# Patient Record
Sex: MALE | Race: WHITE | NOT HISPANIC OR LATINO | Employment: FULL TIME | URBAN - METROPOLITAN AREA
[De-identification: names, ages, dates, MRNs, and addresses within clinical notes are randomized per-mention and may not be internally consistent; named-entity substitution may affect disease eponyms.]

---

## 2017-03-17 ENCOUNTER — OFFICE VISIT (OUTPATIENT)
Dept: URGENT CARE | Facility: RETAIL CLINIC | Age: 47
End: 2017-03-17
Payer: COMMERCIAL

## 2017-03-17 VITALS — HEART RATE: 90 BPM | TEMPERATURE: 98.8 F | SYSTOLIC BLOOD PRESSURE: 122 MMHG | DIASTOLIC BLOOD PRESSURE: 87 MMHG

## 2017-03-17 DIAGNOSIS — J02.9 ACUTE PHARYNGITIS, UNSPECIFIED ETIOLOGY: Primary | ICD-10-CM

## 2017-03-17 LAB — S PYO AG THROAT QL IA.RAPID: NORMAL

## 2017-03-17 PROCEDURE — 87081 CULTURE SCREEN ONLY: CPT | Performed by: PHYSICIAN ASSISTANT

## 2017-03-17 PROCEDURE — 99202 OFFICE O/P NEW SF 15 MIN: CPT | Performed by: PHYSICIAN ASSISTANT

## 2017-03-17 PROCEDURE — 87880 STREP A ASSAY W/OPTIC: CPT | Mod: QW | Performed by: PHYSICIAN ASSISTANT

## 2017-03-17 RX ORDER — PENICILLIN V POTASSIUM 500 MG/1
500 TABLET, FILM COATED ORAL 2 TIMES DAILY
Qty: 20 TABLET | Refills: 0 | Status: SHIPPED | OUTPATIENT
Start: 2017-03-17 | End: 2017-03-27

## 2017-03-17 NOTE — MR AVS SNAPSHOT
"              After Visit Summary   3/17/2017    To White    MRN: [id number]           Patient Information     Date Of Birth          1970        Visit Information        Provider Department      3/17/2017 9:30 AM Nae Burns PA-C [facility]        Today's Diagnoses     Acute pharyngitis, unspecified etiology    -  1      Care Instructions    Rapid strep test today is negative.   Your throat culture is pending. [facility] will call if positive results to start antibiotics at that time; No call if the culture is negative.  Start penicillin twice daily for 10 days since you're leaving the country today.  Drink plenty of fluids and rest.  May use salt water gargles- about 8 oz warm water with about 1 teaspoon salt  Sucrets and Cepacol spray are over the counter medications that numb the throat.  Over the counter pain relievers such as tylenol or ibuprofen may be used as needed.   Honey lemon tea helps to soothe the throat. \"Throat Coat\" tea is soothing as well.  Please follow up with primary care provider if not improving, worsening or new symptoms.        Follow-ups after your visit        Who to contact     You can reach your care team any time of the day by calling [phone].  Notification of test results:  If you have an abnormal lab result, we will notify you by phone as soon as possible.         Additional Information About Your Visit        MyChart Information     ShareSDK[employer] gives you secure access to your electronic health record. If you see a primary care provider, you can also send messages to your care team and make appointments. If you have questions, please call your primary care clinic.  If you do not have a primary care provider, please call [phone] and they will assist you.        Care EveryWhere ID     This is your Care EveryWhere ID. This could be used by other organizations to access your [facility] medical records  [id number]        Your Vitals Were     " Pulse Temperature                90 98.8  F (37.1  C) (Temporal)           Blood Pressure from Last 3 Encounters:   03/17/17 122/87   08/08/16 128/80   08/03/15 105/60    Weight from Last 3 Encounters:   08/08/16 268 lb 11.2 oz (121.9 kg)   08/03/15 261 lb (118.4 kg)   07/22/15 261 lb 8 oz (118.6 kg)              We Performed the Following     BETA STREP GROUP A R/O CULTURE     RAPID STREP SCREEN          Today's Medication Changes          These changes are accurate as of: 3/17/17  9:40 AM.  If you have any questions, ask your nurse or doctor.               Start taking these medicines.        Dose/Directions    penicillin V potassium 500 MG tablet   Commonly known as:  VEETID   Used for:  Acute pharyngitis, unspecified etiology        Dose:  500 mg   Take 1 tablet (500 mg) by mouth 2 times daily for 10 days   Quantity:  20 tablet   Refills:  0            Where to get your medicines      These medications were sent to [facility] - [address]   [address] [place] [address]     Phone:  [phone]     penicillin V potassium 500 MG tablet                Primary Care Provider Office Phone # Fax #    Jordyn Molly Sexton [phone]       [facility] [address] [place] [address]        Thank you!     Thank you for choosing [facility]  for your care. Our goal is always to provide you with excellent care. Hearing back from our patients is one way we can continue to improve our services. Please take a few minutes to complete the written survey that you may receive in the mail after your visit with us. Thank you!             Your Updated Medication List - Protect others around you: Learn how to safely use, store and throw away your medicines at www.disposemymeds.org.          This list is accurate as of: 3/17/17  9:40 AM.  Always use your most recent med list.                   Brand Name Dispense Instructions for  use    ADVIL COLD/SINUS PO          amLODIPine 10 MG tablet    NORVASC    90 tablet    Take 1 tablet by mouth  daily       benazepril 40 MG tablet    LOTENSIN    90 tablet    Take 1 tablet by mouth  daily       penicillin V potassium 500 MG tablet    VEETID    20 tablet    Take 1 tablet (500 mg) by mouth 2 times daily for 10 days

## 2017-03-17 NOTE — PATIENT INSTRUCTIONS
"Rapid strep test today is negative.   Your throat culture is pending. [facility] will call if positive results to start antibiotics at that time; No call if the culture is negative.  Start penicillin twice daily for 10 days since you're leaving the country today.  Drink plenty of fluids and rest.  May use salt water gargles- about 8 oz warm water with about 1 teaspoon salt  Sucrets and Cepacol spray are over the counter medications that numb the throat.  Over the counter pain relievers such as tylenol or ibuprofen may be used as needed.   Honey lemon tea helps to soothe the throat. \"Throat Coat\" tea is soothing as well.  Please follow up with primary care provider if not improving, worsening or new symptoms.  "

## 2017-03-17 NOTE — PROGRESS NOTES
Chief Complaint   Patient presents with     Pharyngitis     x 3-4 days, harder to swallow today, no fevers     SUBJECTIVE:  To White is a 46 year old male presenting with a chief complaint of a sore throat.  Onset of symptoms was 4 days ago.  Course of illness: gradual onset.  Severity: moderate  Current and Associated symptoms: post nasal drip, no cough.  Treatment measures tried include: Advil cold and sinus last taken about 4 hours ago.  Predisposing factors include: None.    Past Medical History   Diagnosis Date     Essential hypertension, benign 2000     Hypertension      Obesity      Other psoriasis      since childhood     Current Outpatient Prescriptions   Medication Sig Dispense Refill     Pseudoephedrine-Ibuprofen (ADVIL COLD/SINUS PO)        penicillin V potassium (VEETID) 500 MG tablet Take 1 tablet (500 mg) by mouth 2 times daily for 10 days 20 tablet 0     amLODIPine (NORVASC) 10 MG tablet Take 1 tablet by mouth  daily 90 tablet 3     benazepril (LOTENSIN) 40 MG tablet Take 1 tablet by mouth  daily 90 tablet 3     Social History   Substance Use Topics     Smoking status: Never Smoker     Smokeless tobacco: Never Used     Alcohol use Yes      Comment: Rarely     Allergies   Allergen Reactions     No Known Allergies      ROS:  Review of systems negative except as stated above.    OBJECTIVE:   /87 (BP Location: Left arm)  Pulse 90  Temp 98.8  F (37.1  C) (Temporal)  GENERAL APPEARANCE: healthy, alert and in no distress  HEENT: Eyes PEERL, conjunctiva clear. Bilateral ear canals and TMs normal. Nose normal. Pharynx erythematous with 2+ tonsillar hypertrophy and left sided exudate noted.  NECK: supple, non-tender to palpation, bilateral anterior cervical adenopathy noted  RESP: lungs clear to auscultation - no rales, rhonchi or wheezes  CV: regular rates and rhythm, normal S1 S2, no murmur noted    Rapid Strep test is negative; await throat culture results.    ASSESSMENT:    ICD-10-CM    1. Acute  "pharyngitis, unspecified etiology J02.9 RAPID STREP SCREEN     BETA STREP GROUP A R/O CULTURE     penicillin V potassium (VEETID) 500 MG tablet     PLAN:   3/4 centor criteria- no fever.  Started on Penicillin as he is leaving the [place] in a few hours.  Please send my chart message with positive or negative result.  Finish antibiotic regardless of culture result.  Patient Instructions   Rapid strep test today is negative.   Your throat culture is pending. [facility] will call if positive results to start antibiotics at that time; No call if the culture is negative.  Start penicillin twice daily for 10 days since you're leaving the country today.  Drink plenty of fluids and rest.  May use salt water gargles- about 8 oz warm water with about 1 teaspoon salt  Sucrets and Cepacol spray are over the counter medications that numb the throat.  Over the counter pain relievers such as tylenol or ibuprofen may be used as needed.   Honey lemon tea helps to soothe the throat. \"Throat Coat\" tea is soothing as well.  Please follow up with primary care provider if not improving, worsening or new symptoms.    Follow up with primary care provider with any problems, questions or concerns or if symptoms worsen or fail to improve. Patient agreed to plan and verbalized understanding.    Karley Burns PA-C  [facility] [place]  "

## 2017-03-17 NOTE — NURSING NOTE
"Chief Complaint   Patient presents with     Pharyngitis     x 3-4 days, harder to swallow today, no fevers       Initial /87 (BP Location: Left arm)  Pulse 90  Temp 98.8  F (37.1  C) (Temporal) Estimated body mass index is 34.97 kg/(m^2) as calculated from the following:    Height as of 8/8/16: 6' 1.5\" (1.867 m).    Weight as of 8/8/16: 268 lb 11.2 oz (121.9 kg).  Medication Reconciliation: complete  "

## 2017-03-19 LAB — BETA STREP CONFIRM: NORMAL

## 2017-04-11 NOTE — PROGRESS NOTES
SUBJECTIVE:     CC: To White is an 47 year old male who presents for preventative health visit.     HPI    Patient has had a chronic cough for about 5 weeks. Started with a cold .  Finished abx     Also would like to discuss psoriasis options and a few spots on his back that itch.    Patient also has possible athletes foot he would like looked at today.      Today's PHQ-2 Score:   PHQ-2 ( 1999 Pfizer) 8/8/2016   Q1: Little interest or pleasure in doing things 0   Q2: Feeling down, depressed or hopeless 0   PHQ-2 Score 0       Abuse: Current or Past(Physical, Sexual or Emotional)- No  Do you feel safe in your environment - Yes    Social History   Substance Use Topics     Smoking status: Never Smoker     Smokeless tobacco: Never Used     Alcohol use Yes      Comment: Rarely     The patient does not drink >3 drinks per day nor >7 drinks per week.    Last PSA: No results found for: PSA    Recent Labs   Lab Test  08/08/16   1749  03/11/10   1553   CHOL  181  162   HDL  47  33*   LDL  98  108   TRIG  179*  100   CHOLHDLRATIO   --   5.0   NHDL  134*   --        Reviewed orders with patient. Reviewed health maintenance and updated orders accordingly - Yes    Reviewed and updated as needed this visit by clinical staff         Reviewed and updated as needed this visit by Provider          Past Medical History:   Diagnosis Date     Essential hypertension, benign 2000     Hypertension      Obesity      Other psoriasis     since childhood      Past Surgical History:   Procedure Laterality Date     CHOLECYSTECTOMY       LAPAROSCOPIC CHOLECYSTECTOMY  7/3/2012    Procedure: LAPAROSCOPIC CHOLECYSTECTOMY;  Laparoscopic Cholecystectomy;  Surgeon: Kulwinder Hollins MD;  Location: [facility]     NO HISTORY OF SURGERY         ROS:  C: NEGATIVE for fever, chills, change in weight  I: NEGATIVE for worrisome rashes, moles or lesions  E: NEGATIVE for vision changes or irritation  ENT: NEGATIVE for ear, mouth and throat problems  R: NEGATIVE  for significant cough or SOB  CV: NEGATIVE for chest pain, palpitations or peripheral edema  GI: NEGATIVE for nausea, abdominal pain, heartburn, or change in bowel habits   male: negative for dysuria, hematuria, decreased urinary stream, erectile dysfunction, urethral discharge  M: NEGATIVE for significant arthralgias or myalgia  N: NEGATIVE for weakness, dizziness or paresthesias  P: NEGATIVE for changes in mood or affect    Problem list, Medication list, Allergies, and Medical/Social/Surgical histories reviewed in [facility] and updated as appropriate.  Labs reviewed in EPIC  BP Readings from Last 3 Encounters:   17 120/76   17 122/87   16 128/80    Wt Readings from Last 3 Encounters:   17 256 lb (116.1 kg)   16 268 lb 11.2 oz (121.9 kg)   08/03/15 261 lb (118.4 kg)                  Patient Active Problem List   Diagnosis     Other psoriasis     Obesity     Essential hypertension with goal blood pressure less than 140/90     Past Surgical History:   Procedure Laterality Date     CHOLECYSTECTOMY       LAPAROSCOPIC CHOLECYSTECTOMY  7/3/2012    Procedure: LAPAROSCOPIC CHOLECYSTECTOMY;  Laparoscopic Cholecystectomy;  Surgeon: Kulwinder Hollins MD;  Location: [facility]     NO HISTORY OF SURGERY         Social History   Substance Use Topics     Smoking status: Never Smoker     Smokeless tobacco: Never Used     Alcohol use Yes      Comment: Rarely     Family History   Problem Relation Age of Onset     Hypertension Father      CEREBROVASCULAR DISEASE Maternal Grandmother      Cancer - colorectal Maternal Uncle      dx age 45,  before age 50     GASTROINTESTINAL DISEASE Mother      benign colon polyps age over 50     DIABETES No family hx of      Prostate Cancer No family hx of          Current Outpatient Prescriptions   Medication Sig Dispense Refill     amLODIPine (NORVASC) 10 MG tablet Take 1 tablet by mouth  daily 90 tablet 3     benazepril (LOTENSIN) 40 MG tablet Take 1 tablet by mouth  daily  "90 tablet 3     Pseudoephedrine-Ibuprofen (ADVIL COLD/SINUS PO) Reported on 4/13/2017       Allergies   Allergen Reactions     No Known Allergies      OBJECTIVE:     There were no vitals taken for this visit.  EXAM:  /76 (BP Location: Right arm, Patient Position: Chair, Cuff Size: Adult Large)  Pulse 89  Temp 98.1  F (36.7  C) (Temporal)  Resp 14  Ht 6' 2.02\" (1.88 m)  Wt 256 lb (116.1 kg)  SpO2 98%  BMI 32.85 kg/m2    Physical Exam   Constitutional: He is oriented to person, place, and time. He appears well-developed and well-nourished.   HENT:   Head: Normocephalic and atraumatic.   Right Ear: External ear normal.   Left Ear: External ear normal.   Nose: Nose normal.   Eyes: EOM are normal.   Neck: Normal range of motion. No thyromegaly present.   Cardiovascular: Normal rate and regular rhythm.    Pulmonary/Chest: Effort normal and breath sounds normal.   Abdominal: Soft. Bowel sounds are normal.   Neurological: He is alert and oriented to person, place, and time.   Skin: Rash noted.   psoriasis   Psychiatric: He has a normal mood and affect.         ASSESSMENT/PLAN:       Problem List Items Addressed This Visit     Other psoriasis     Not on treatment. Would like to persue treatment . Referral placed         Relevant Medications    fluocinonide (LIDEX) 0.05 % cream    Essential hypertension with goal blood pressure less than 140/90     Well controlled   Continue benepril and amlodipine           Other Visit Diagnoses     Psoriasis    -  Primary    Relevant Medications    fluocinonide (LIDEX) 0.05 % cream    Other Relevant Orders    DERMATOLOGY REFERRAL    Family history of colon cancer        Relevant Orders    GASTROENTEROLOGY ADULT REF PROCEDURE ONLY    Screen for colon cancer        Relevant Orders    GASTROENTEROLOGY ADULT REF PROCEDURE ONLY    Encounter for routine adult health examination without abnormal findings          cough- likely post infectious with superimpose allergies  Advised OTC " "claritin and zantac for symptomatic relief  Discussed home care  Reportable signs and symptoms discussed  RTC if symptoms persist or fail to improve    Forms filled out    COUNSELING:   Reviewed preventive health counseling, as reflected in patient instructions       Regular exercise       Healthy diet/nutrition       Vision screening       Hearing screening         reports that he has never smoked. He has never used smokeless tobacco.    Estimated body mass index is 34.97 kg/(m^2) as calculated from the following:    Height as of 8/8/16: 6' 1.5\" (1.867 m).    Weight as of 8/8/16: 268 lb 11.2 oz (121.9 kg).   Weight management plan: Discussed healthy diet and exercise guidelines and patient will follow up in 12 months in clinic to re-evaluate.    Counseling Resources:  ATP IV Guidelines  Pooled Cohorts Equation Calculator  FRAX Risk Assessment  ICSI Preventive Guidelines  Dietary Guidelines for Americans, 2010  USDA's MyPlate  ASA Prophylaxis  Lung CA Screening    Annel Yusuf MD  [facility]  "

## 2017-04-13 ENCOUNTER — OFFICE VISIT (OUTPATIENT)
Dept: FAMILY MEDICINE | Facility: OTHER | Age: 47
End: 2017-04-13
Payer: COMMERCIAL

## 2017-04-13 VITALS
HEART RATE: 89 BPM | OXYGEN SATURATION: 98 % | RESPIRATION RATE: 14 BRPM | HEIGHT: 74 IN | WEIGHT: 256 LBS | SYSTOLIC BLOOD PRESSURE: 120 MMHG | TEMPERATURE: 98.1 F | BODY MASS INDEX: 32.85 KG/M2 | DIASTOLIC BLOOD PRESSURE: 76 MMHG

## 2017-04-13 DIAGNOSIS — L40.9 PSORIASIS: Primary | ICD-10-CM

## 2017-04-13 DIAGNOSIS — L40.8 OTHER PSORIASIS: ICD-10-CM

## 2017-04-13 DIAGNOSIS — Z12.11 SCREEN FOR COLON CANCER: ICD-10-CM

## 2017-04-13 DIAGNOSIS — I10 ESSENTIAL HYPERTENSION WITH GOAL BLOOD PRESSURE LESS THAN 140/90: ICD-10-CM

## 2017-04-13 DIAGNOSIS — Z80.0 FAMILY HISTORY OF COLON CANCER: ICD-10-CM

## 2017-04-13 DIAGNOSIS — Z00.00 ENCOUNTER FOR ROUTINE ADULT HEALTH EXAMINATION WITHOUT ABNORMAL FINDINGS: ICD-10-CM

## 2017-04-13 PROCEDURE — 99396 PREV VISIT EST AGE 40-64: CPT | Performed by: FAMILY MEDICINE

## 2017-04-13 RX ORDER — FLUOCINONIDE 0.5 MG/G
CREAM TOPICAL
Qty: 15 G | Refills: 0 | Status: SHIPPED | OUTPATIENT
Start: 2017-04-13 | End: 2017-05-08

## 2017-04-13 ASSESSMENT — PAIN SCALES - GENERAL: PAINLEVEL: NO PAIN (0)

## 2017-04-13 NOTE — NURSING NOTE
"Chief Complaint   Patient presents with     Cough     Medication Request     Physical     Panel Management       Initial /76 (BP Location: Right arm, Patient Position: Chair, Cuff Size: Adult Large)  Pulse 89  Temp 98.1  F (36.7  C) (Temporal)  Resp 14  Ht 6' 2.02\" (1.88 m)  Wt 256 lb (116.1 kg)  SpO2 98%  BMI 32.85 kg/m2 Estimated body mass index is 32.85 kg/(m^2) as calculated from the following:    Height as of this encounter: 6' 2.02\" (1.88 m).    Weight as of this encounter: 256 lb (116.1 kg).  Medication Reconciliation: complete   Jennifer Sharp CMA (AAMA)      "

## 2017-04-13 NOTE — MR AVS SNAPSHOT
After Visit Summary   4/13/2017    To White    MRN: [id number]           Patient Information     Date Of Birth          1970        Visit Information        Provider Department      4/13/2017 7:40 AM Annel Yusuf MD [facility]        Today's Diagnoses     Psoriasis    -  1    Essential hypertension with goal blood pressure less than 140/90        Other psoriasis        Family history of colon cancer        Screen for colon cancer        Encounter for routine adult health examination without abnormal findings          Care Instructions      Preventive Health Recommendations  Male Ages 40 to 49    Yearly exam:             See your health care provider every year in order to  o   Review health changes.   o   Discuss preventive care.    o   Review your medicines if your doctor has prescribed any.    You should be tested each year for STDs (sexually transmitted diseases) if you re at risk.     Have a cholesterol test every 5 years.     Have a colonoscopy (test for colon cancer) if someone in your family has had colon cancer or polyps before age 50.     After age 45, have a diabetes test (fasting glucose). If you are at risk for diabetes, you should have this test every 3 years.      Talk with your health care provider about whether or not a prostate cancer screening test (PSA) is right for you.    Shots: Get a flu shot each year. Get a tetanus shot every 10 years.     Nutrition:    Eat at least 5 servings of fruits and vegetables daily.     Eat whole-grain bread, whole-wheat pasta and brown rice instead of white grains and rice.     Talk to your provider about Calcium and Vitamin D.     Lifestyle    Exercise for at least 150 minutes a week (30 minutes a day, 5 days a week). This will help you control your weight and prevent disease.     Limit alcohol to one drink per day.     No smoking.     Wear sunscreen to prevent skin cancer.     See your dentist every six months for an  exam and cleaning.            Follow-ups after your visit        Additional Services     DERMATOLOGY REFERRAL       Your provider has referred you to: [facility]: [facility] [place] [phone]   [email or web address]    Please be aware that coverage of these services is subject to the terms and limitations of your health insurance plan.  Call member services at your health plan with any benefit or coverage questions.      Please bring the following with you to your appointment:    (1) Any X-Rays, CTs or MRIs which have been performed.  Contact the facility where they were done to arrange for  prior to your scheduled appointment.    (2) List of current medications  (3) This referral request   (4) Any documents/labs given to you for this referral            GASTROENTEROLOGY ADULT REF PROCEDURE ONLY       Last Lab Result: Creatinine (mg/dL)       Date                     Value                 08/08/2016               0.97             ----------  Body mass index is 32.85 kg/(m^2).     Needed:  No  Language:  English    Patient will be contacted to schedule procedure.     Please be aware that coverage of these services is subject to the terms and limitations of your health insurance plan.  Call member services at your health plan with any benefit or coverage questions.  Any procedures must be performed at a [facility] facility OR coordinated by your clinic's referral office.    Please bring the following with you to your appointment:    (1) Any X-Rays, CTs or MRIs which have been performed.  Contact the facility where they were done to arrange for  prior to your scheduled appointment.    (2) List of current medications   (3) This referral request   (4) Any documents/labs given to you for this referral                  Who to contact     If you have questions or need follow up information about today's clinic visit or your  "schedule please contact [facility] [place] directly at [phone].  Normal or non-critical lab and imaging results will be communicated to you by MyChart, letter or phone within 4 business days after the clinic has received the results. If you do not hear from us within 7 days, please contact the clinic through [employer] or phone. If you have a critical or abnormal lab result, we will notify you by phone as soon as possible.  Submit refill requests through [employer] or call your pharmacy and they will forward the refill request to us. Please allow 3 business days for your refill to be completed.          Additional Information About Your Visit        April[employer]ity Information     [employer] gives you secure access to your electronic health record. If you see a primary care provider, you can also send messages to your care team and make appointments. If you have questions, please call your primary care clinic.  If you do not have a primary care provider, please call [phone] and they will assist you.        Care EveryWhere ID     This is your Care EveryWhere ID. This could be used by other organizations to access your [facility] medical records  [id number]        Your Vitals Were     Pulse Temperature Respirations Height Pulse Oximetry BMI (Body Mass Index)    89 98.1  F (36.7  C) (Temporal) 14 6' 2.02\" (1.88 m) 98% 32.85 kg/m2       Blood Pressure from Last 3 Encounters:   04/13/17 120/76   03/17/17 122/87   08/08/16 128/80    Weight from Last 3 Encounters:   04/13/17 256 lb (116.1 kg)   08/08/16 268 lb 11.2 oz (121.9 kg)   08/03/15 261 lb (118.4 kg)              We Performed the Following     DERMATOLOGY REFERRAL     GASTROENTEROLOGY ADULT REF PROCEDURE ONLY          Today's Medication Changes          These changes are accurate as of: 4/13/17  8:10 AM.  If you have any questions, ask your nurse or doctor.               Start taking these medicines.        Dose/Directions    fluocinonide 0.05 % cream   Commonly " known as:  LIDEX   Used for:  Psoriasis   Started by:  Annel Yusuf MD        Apply sparingly to affected area twice daily for 14 days.  Do not apply to face.   Quantity:  15 g   Refills:  0            Where to get your medicines      These medications were sent to [facility]  [place] - [address]   [address] [place] [address]     Phone:  [phone]     fluocinonide 0.05 % cream                Primary Care Provider Office Phone # Fax #    Jordyn Molly Sexton [phone]       [facility] [address] [place] [address]        Thank you!     Thank you for choosing [facility]  for your care. Our goal is always to provide you with excellent care. Hearing back from our patients is one way we can continue to improve our services. Please take a few minutes to complete the written survey that you may receive in the mail after your visit with us. Thank you!             Your Updated Medication List - Protect others around you: Learn how to safely use, store and throw away your medicines at www.disposemymeds.org.          This list is accurate as of: 4/13/17  8:10 AM.  Always use your most recent med list.                   Brand Name Dispense Instructions for use    ADVIL COLD/SINUS PO      Reported on 4/13/2017       amLODIPine 10 MG tablet    NORVASC    90 tablet    Take 1 tablet by mouth  daily       benazepril 40 MG tablet    LOTENSIN    90 tablet    Take 1 tablet by mouth  daily       fluocinonide 0.05 % cream    LIDEX    15 g    Apply sparingly to affected area twice daily for 14 days.  Do not apply to face.

## 2017-04-14 ENCOUNTER — TELEPHONE (OUTPATIENT)
Dept: FAMILY MEDICINE | Facility: OTHER | Age: 47
End: 2017-04-14

## 2017-04-14 NOTE — TELEPHONE ENCOUNTER
Left message for patient to return call to schedule EGD/colonoscopy. If Tracee or Sangeetha are not available, please transfer to same day surgery        Ok to schedule with Kurtis or Rosario

## 2017-05-10 ENCOUNTER — SURGERY (OUTPATIENT)
Age: 47
End: 2017-05-10

## 2017-05-10 ENCOUNTER — HOSPITAL ENCOUNTER (OUTPATIENT)
Facility: CLINIC | Age: 47
Discharge: HOME | End: 2017-05-10
Attending: INTERNAL MEDICINE | Admitting: INTERNAL MEDICINE
Payer: COMMERCIAL

## 2017-05-10 VITALS
RESPIRATION RATE: 16 BRPM | TEMPERATURE: 97.8 F | SYSTOLIC BLOOD PRESSURE: 113 MMHG | DIASTOLIC BLOOD PRESSURE: 80 MMHG | OXYGEN SATURATION: 94 %

## 2017-05-10 LAB — COLONOSCOPY: NORMAL

## 2017-05-10 PROCEDURE — 25000125 ZZHC RX 250: Performed by: INTERNAL MEDICINE

## 2017-05-10 PROCEDURE — 88305 TISSUE EXAM BY PATHOLOGIST: CPT | Mod: 26 | Performed by: INTERNAL MEDICINE

## 2017-05-10 PROCEDURE — 25000128 H RX IP 250 OP 636: Performed by: INTERNAL MEDICINE

## 2017-05-10 PROCEDURE — 45385 COLONOSCOPY W/LESION REMOVAL: CPT | Performed by: INTERNAL MEDICINE

## 2017-05-10 PROCEDURE — 88305 TISSUE EXAM BY PATHOLOGIST: CPT | Performed by: INTERNAL MEDICINE

## 2017-05-10 PROCEDURE — 40000296 ZZH STATISTIC ENDO RECOVERY CLASS 1:2 FIRST HOUR: Performed by: INTERNAL MEDICINE

## 2017-05-10 RX ORDER — LIDOCAINE 40 MG/G
CREAM TOPICAL
Status: DISCONTINUED | OUTPATIENT
Start: 2017-05-10 | End: 2017-05-10 | Stop reason: HOSPADM

## 2017-05-10 RX ORDER — ONDANSETRON 2 MG/ML
4 INJECTION INTRAMUSCULAR; INTRAVENOUS
Status: DISCONTINUED | OUTPATIENT
Start: 2017-05-10 | End: 2017-05-10 | Stop reason: HOSPADM

## 2017-05-10 RX ORDER — FENTANYL CITRATE 50 UG/ML
INJECTION, SOLUTION INTRAMUSCULAR; INTRAVENOUS PRN
Status: DISCONTINUED | OUTPATIENT
Start: 2017-05-10 | End: 2017-05-10 | Stop reason: HOSPADM

## 2017-05-10 RX ADMIN — FENTANYL CITRATE 25 MCG: 50 INJECTION, SOLUTION INTRAMUSCULAR; INTRAVENOUS at 12:24

## 2017-05-10 RX ADMIN — MIDAZOLAM HYDROCHLORIDE 1 MG: 1 INJECTION, SOLUTION INTRAMUSCULAR; INTRAVENOUS at 12:17

## 2017-05-10 RX ADMIN — FENTANYL CITRATE 25 MCG: 50 INJECTION, SOLUTION INTRAMUSCULAR; INTRAVENOUS at 12:20

## 2017-05-10 RX ADMIN — MIDAZOLAM HYDROCHLORIDE 2 MG: 1 INJECTION, SOLUTION INTRAMUSCULAR; INTRAVENOUS at 12:15

## 2017-05-10 RX ADMIN — MIDAZOLAM HYDROCHLORIDE 2 MG: 1 INJECTION, SOLUTION INTRAMUSCULAR; INTRAVENOUS at 12:26

## 2017-05-10 RX ADMIN — FENTANYL CITRATE 50 MCG: 50 INJECTION, SOLUTION INTRAMUSCULAR; INTRAVENOUS at 12:15

## 2017-05-10 RX ADMIN — LIDOCAINE HYDROCHLORIDE 1 ML: 10 INJECTION, SOLUTION EPIDURAL; INFILTRATION; INTRACAUDAL; PERINEURAL at 11:50

## 2017-05-10 RX ADMIN — MIDAZOLAM HYDROCHLORIDE 1 MG: 1 INJECTION, SOLUTION INTRAMUSCULAR; INTRAVENOUS at 12:16

## 2017-05-10 RX ADMIN — MIDAZOLAM HYDROCHLORIDE 1 MG: 1 INJECTION, SOLUTION INTRAMUSCULAR; INTRAVENOUS at 12:18

## 2017-05-10 NOTE — IP AVS SNAPSHOT
[facility]    [address] [address]    Phone:  [phone]                                       After Visit Summary   5/10/2017    To White    MRN: [id number]           After Visit Summary Signature Page     I have received my discharge instructions, and my questions have been answered. I have discussed any challenges I see with this plan with the nurse or doctor.    ..........................................................................................................................................  Patient/Patient Representative Signature      ..........................................................................................................................................  Patient Representative Print Name and Relationship to Patient    ..................................................               ................................................  Date                                            Time    ..........................................................................................................................................  Reviewed by Signature/Title    ...................................................              ..............................................  Date                                                            Time

## 2017-05-10 NOTE — IP AVS SNAPSHOT
MRN:[id number]                      After Visit Summary   5/10/2017    To White    MRN: [id number]           Thank you!     Thank you for choosing [facility] for your care. Our goal is always to provide you with excellent care. Hearing back from our patients is one way we can continue to improve our services. Please take a few minutes to complete the written survey that you may receive in the mail after you visit with us. Thank you!        Patient Information     Date Of Birth          1970        About your hospital stay     You were admitted on:  May 10, 2017 You last received care in the:  [facility]    You were discharged on:  May 10, 2017       Who to Call     For medical emergencies, please call 911.  For non-urgent questions about your medical care, please call your primary care provider or clinic, [phone]  For questions related to your surgery, please call your surgery clinic        Attending Provider     Provider Specialty    Mikey Del Valle MD Gastroenterology       Primary Care Provider Office Phone # Fax #    [facility] Molly Sexton [phone]       [facility] [address] [place] [address]        Your next 10 appointments already scheduled     Jun 20, 2017  1:30 PM CDT   [facility]    [address] [place] [address] [phone]              Pending Results     No orders found from 5/8/2017 to 5/11/2017.            Admission Information     Date & Time Provider Department Dept. Phone    5/10/2017 Mikey Del Valle MD [facility] [phone]      Your Vitals Were     Blood Pressure Temperature Respirations Pulse Oximetry          113/32 97.8  F (36.6  C) (Oral) 14 98%        MyChart Information     [employer] gives you secure access to your electronic health record. If you see a primary care provider,  you can also send messages to your care team and make appointments. If you have questions, please call your primary care clinic.  If you do not have a primary care provider, please call [phone] and they will assist you.        Care EveryWhere ID     This is your Care EveryWhere ID. This could be used by other organizations to access your [facility] medical records  [id number]           Review of your medicines      CONTINUE these medicines which have NOT CHANGED        Dose / Directions    ADVIL COLD/SINUS PO        Reported on 4/13/2017   Refills:  0       ALLEGRA PO        Refills:  0       amLODIPine 10 MG tablet   Commonly known as:  NORVASC   Used for:  Essential hypertension with goal blood pressure less than 140/90        Take 1 tablet by mouth  daily   Quantity:  90 tablet   Refills:  3       benazepril 40 MG tablet   Commonly known as:  LOTENSIN   Used for:  Essential hypertension with goal blood pressure less than 140/90        Take 1 tablet by mouth  daily   Quantity:  90 tablet   Refills:  3                Protect others around you: Learn how to safely use, store and throw away your medicines at www.disposemymeds.org.             Medication List: This is a list of all your medications and when to take them. Check marks below indicate your daily home schedule. Keep this list as a reference.      Medications           Morning Afternoon Evening Bedtime As Needed    ADVIL COLD/SINUS PO   Reported on 4/13/2017                                ALLEGRA PO                                amLODIPine 10 MG tablet   Commonly known as:  NORVASC   Take 1 tablet by mouth  daily                                benazepril 40 MG tablet   Commonly known as:  LOTENSIN   Take 1 tablet by mouth  daily

## 2017-05-10 NOTE — CONSULTS
[facility] Pre-Procedure Physical Assessment    To White MRN# [id number]   Age: 47 year old YOB: 1970      Date of Surgery: 5/10/2017  Location [facility]      Date of Exam 5/10/2017 Facility (Same day)       Home clinic: [facility]  Primary care provider: Jordyn Sexton         Active problem list:   Patient Active Problem List   Diagnosis     Other psoriasis     Obesity     Essential hypertension with goal blood pressure less than 140/90            Medications (include herbals and vitamins):   Any Plavix use in the last 7 days?  No     Current Facility-Administered Medications   Medication     lidocaine 1 % 1 mL     lidocaine (LMX4) kit     sodium chloride (PF) 0.9% PF flush 3 mL     sodium chloride (PF) 0.9% PF flush 3 mL     sodium chloride (PF) 0.9% PF flush 3 mL     ondansetron (ZOFRAN) injection 4 mg             Allergies:      Allergies   Allergen Reactions     No Known Allergies      Allergy to Latex?  No  Allergy to tape?    No          Social History:     Social History   Substance Use Topics     Smoking status: Never Smoker     Smokeless tobacco: Never Used     Alcohol use Yes      Comment: Rarely            Physical Exam:   All vitals have been reviewed  Blood pressure 128/86, temperature 97.8  F (36.6  C), temperature source Oral, resp. rate 16, SpO2 94 %.  Airway assessment:   Patient is able to open mouth wide  Patient is able to stick out tongue      Lungs:   No increased work of breathing, good air exchange, clear to auscultation bilaterally, no crackles or wheezing      Cardiovascular:   Normal apical impulse, regular rate and rhythm, normal S1 and S2, no S3 or S4, and no murmur noted           Lab / Radiology Results:   All laboratory data reviewed          Assessment:   Appropriately NPO  Chief complaint or anatomic assessment of involved area: screen, FHx colon cancer         Plan:   Moderate (conscious) sedation      Patient's active problems diagnostically and therapeutically optimized for the planned procedure  Risks, benefits, alternatives to sedation and blood explained and consent obtained  Risks, benefits, alternatives to procedure explained and consent obtained  Orders and progress notes are in the chart  Discharge from Phase 1 and / or Phase 2 recovery when patient meets criteria    I have reviewed the history and physical, lab finding(s), diagnostic data, medicaitons, and the plan for sedation.  I have determined this patient to be an appropriate candidate for the planned sedation / procedure and have reassessed the patient immediately prior to sedation / procedure.    I have personally and medically directed the administration of medications used.    Mikey Del Valle MD

## 2017-05-12 LAB — COPATH REPORT: NORMAL

## 2017-05-17 ENCOUNTER — TELEPHONE (OUTPATIENT)
Dept: DERMATOLOGY | Facility: CLINIC | Age: 47
End: 2017-05-17

## 2017-05-17 NOTE — TELEPHONE ENCOUNTER
New patient patched through by call center to look for sooner appointment. Unable to find d/t patient's limited availablity. Patient will follow up with PCP who initiated treatment until derm appt.

## 2017-05-19 ENCOUNTER — OFFICE VISIT (OUTPATIENT)
Dept: FAMILY MEDICINE | Facility: OTHER | Age: 47
End: 2017-05-19
Payer: COMMERCIAL

## 2017-05-19 VITALS
DIASTOLIC BLOOD PRESSURE: 74 MMHG | WEIGHT: 261 LBS | RESPIRATION RATE: 16 BRPM | TEMPERATURE: 97.8 F | SYSTOLIC BLOOD PRESSURE: 126 MMHG | HEIGHT: 74 IN | HEART RATE: 74 BPM | BODY MASS INDEX: 33.5 KG/M2

## 2017-05-19 DIAGNOSIS — L40.8 OTHER PSORIASIS: Primary | ICD-10-CM

## 2017-05-19 DIAGNOSIS — L29.9 ITCHING: ICD-10-CM

## 2017-05-19 PROCEDURE — 99214 OFFICE O/P EST MOD 30 MIN: CPT | Performed by: FAMILY MEDICINE

## 2017-05-19 RX ORDER — FLUOCINONIDE 0.5 MG/G
CREAM TOPICAL
COMMUNITY
Start: 2017-04-13 | End: 2017-05-19

## 2017-05-19 RX ORDER — CALCIPOTRIENE 50 UG/G
CREAM TOPICAL 2 TIMES DAILY
Qty: 120 G | Refills: 1 | Status: SHIPPED | OUTPATIENT
Start: 2017-05-19 | End: 2020-09-10

## 2017-05-19 RX ORDER — FLUOCINONIDE 0.5 MG/G
CREAM TOPICAL 2 TIMES DAILY
Qty: 120 G | Refills: 1 | Status: SHIPPED | OUTPATIENT
Start: 2017-05-19 | End: 2019-09-05

## 2017-05-19 RX ORDER — HYDROXYZINE HYDROCHLORIDE 25 MG/1
25-50 TABLET, FILM COATED ORAL EVERY 6 HOURS PRN
Qty: 60 TABLET | Refills: 1 | Status: SHIPPED | OUTPATIENT
Start: 2017-05-19 | End: 2019-02-15

## 2017-05-19 ASSESSMENT — PAIN SCALES - GENERAL: PAINLEVEL: NO PAIN (0)

## 2017-05-19 NOTE — MR AVS SNAPSHOT
After Visit Summary   5/19/2017    To White    MRN: [id number]           Patient Information     Date Of Birth          1970        Visit Information        Provider Department      5/19/2017 9:20 AM Jordyn Sexton MD [facility]        Today's Diagnoses     Other psoriasis    -  1    Itching           Follow-ups after your visit        Your next 10 appointments already scheduled     Jun 20, 2017  1:30 PM CDT   [facility]    [address] [place] [address] [phone]              Who to contact     If you have questions or need follow up information about today's clinic visit or your schedule please contact [facility] directly at [phone].  Normal or non-critical lab and imaging results will be communicated to you by MyChart, letter or phone within 4 business days after the clinic has received the results. If you do not hear from us within 7 days, please contact the clinic through MyChart or phone. If you have a critical or abnormal lab result, we will notify you by phone as soon as possible.  Submit refill requests through [employer] or call your pharmacy and they will forward the refill request to us. Please allow 3 business days for your refill to be completed.          Additional Information About Your Visit        MyChart Information     [employer] gives you secure access to your electronic health record. If you see a primary care provider, you can also send messages to your care team and make appointments. If you have questions, please call your primary care clinic.  If you do not have a primary care provider, please call [phone] and they will assist you.        Care EveryWhere ID     This is your Care EveryWhere ID. This could be used by other organizations to access your [place] medical records  [id number]        Your Vitals Were      "Pulse Temperature Respirations Height BMI (Body Mass Index)       74 97.8  F (36.6  C) (Temporal) 16 6' 1.75\" (1.873 m) 33.74 kg/m2        Blood Pressure from Last 3 Encounters:   05/19/17 126/74   05/10/17 113/80   04/13/17 120/76    Weight from Last 3 Encounters:   05/19/17 261 lb (118.4 kg)   04/13/17 256 lb (116.1 kg)   08/08/16 268 lb 11.2 oz (121.9 kg)              Today, you had the following     No orders found for display         Today's Medication Changes          These changes are accurate as of: 5/19/17  9:34 AM.  If you have any questions, ask your nurse or doctor.               Start taking these medicines.        Dose/Directions    calcipotriene 0.005 % cream   Commonly known as:  DOVONOX   Used for:  Other psoriasis   Started by:  Jordyn Sexton MD        Apply topically 2 times daily   Quantity:  120 g   Refills:  1       hydrOXYzine 25 MG tablet   Commonly known as:  ATARAX   Used for:  Itching   Started by:  Jordyn Sexton MD        Dose:  25-50 mg   Take 1-2 tablets (25-50 mg) by mouth every 6 hours as needed for itching   Quantity:  60 tablet   Refills:  1         These medicines have changed or have updated prescriptions.        Dose/Directions    fluocinonide 0.05 % cream   Commonly known as:  LIDEX   This may have changed:    - how to take this  - when to take this   Used for:  Other psoriasis   Changed by:  Jordyn Sexton MD        Apply topically 2 times daily   Quantity:  120 g   Refills:  1            Where to get your medicines      These medications were sent to [facility] [place] [address]   [address] [place] [address]     Phone:  [phone]     calcipotriene 0.005 % cream    fluocinonide 0.05 % cream    hydrOXYzine 25 MG tablet                Primary Care Provider Office Phone # Fax #    Jordyn Sexton [phone]       [facility] [address] [place] [address]      "   Thank you!     Thank you for choosing [facility]  for your care. Our goal is always to provide you with excellent care. Hearing back from our patients is one way we can continue to improve our services. Please take a few minutes to complete the written survey that you may receive in the mail after your visit with us. Thank you!             Your Updated Medication List - Protect others around you: Learn how to safely use, store and throw away your medicines at www.disposemymeds.org.          This list is accurate as of: 5/19/17  9:34 AM.  Always use your most recent med list.                   Brand Name Dispense Instructions for use    ADVIL COLD/SINUS PO      Reported on 5/19/2017       ALLEGRA PO      Reported on 5/19/2017       amLODIPine 10 MG tablet    NORVASC    90 tablet    Take 1 tablet by mouth  daily       benazepril 40 MG tablet    LOTENSIN    90 tablet    Take 1 tablet by mouth  daily       calcipotriene 0.005 % cream    DOVONOX    120 g    Apply topically 2 times daily       fluocinonide 0.05 % cream    LIDEX    120 g    Apply topically 2 times daily       hydrOXYzine 25 MG tablet    ATARAX    60 tablet    Take 1-2 tablets (25-50 mg) by mouth every 6 hours as needed for itching

## 2017-05-19 NOTE — NURSING NOTE
"Chief Complaint   Patient presents with     Psoriasis follow up     Medication     Panel Management       Initial /74  Pulse 74  Temp 97.8  F (36.6  C) (Temporal)  Resp 16  Ht 6' 1.75\" (1.873 m)  Wt 261 lb (118.4 kg)  BMI 33.74 kg/m2 Estimated body mass index is 33.74 kg/(m^2) as calculated from the following:    Height as of this encounter: 6' 1.75\" (1.873 m).    Weight as of this encounter: 261 lb (118.4 kg).  Medication Reconciliation: complete     Isabela Brown MA    "

## 2017-06-20 ENCOUNTER — OFFICE VISIT (OUTPATIENT)
Dept: DERMATOLOGY | Facility: CLINIC | Age: 47
End: 2017-06-20
Attending: FAMILY MEDICINE
Payer: COMMERCIAL

## 2017-06-20 DIAGNOSIS — L40.0 PLAQUE PSORIASIS: Primary | ICD-10-CM

## 2017-06-20 PROCEDURE — 99202 OFFICE O/P NEW SF 15 MIN: CPT | Performed by: DERMATOLOGY

## 2017-06-20 RX ORDER — DESONIDE 0.5 MG/G
OINTMENT TOPICAL
Qty: 60 G | Refills: 6 | Status: SHIPPED | OUTPATIENT
Start: 2017-06-20 | End: 2020-09-10

## 2017-06-20 RX ORDER — CALCIPOTRIENE 50 UG/G
OINTMENT TOPICAL
Qty: 240 G | Refills: 3 | Status: SHIPPED | OUTPATIENT
Start: 2017-06-20 | End: 2024-06-28

## 2017-06-20 RX ORDER — TRIAMCINOLONE ACETONIDE 1 MG/G
OINTMENT TOPICAL
Qty: 454 G | Refills: 6 | Status: SHIPPED | OUTPATIENT
Start: 2017-06-20 | End: 2019-02-15

## 2017-06-20 NOTE — PROGRESS NOTES
[facility] Dermatology Note      Dermatology Problem List:  1.Plaque psoriasis: 1-3% TBSA, well controlled now. Potential nbUVB.  -triamcinolone 0.1% ointment, desonide 0.05% ointment, calcipotriene 0.005% ointment    Encounter Date: Jun 20, 2017    CC:  Chief Complaint   Patient presents with     Derm Problem     psorasis         History of Present Illness:  Mr. To White is a 47 year old male who was referred by Dr. Annel Yusuf presents for evaluation of psoriasis. He has had the spots for most of his life. Most days he scraps the lesions down with a buff pad. As of 5/19/2017 he uses calcipotriene 0.005% cream and fluocinonide 0.05% cream daily or every other day when he remembers. He has had prior light treatment and didn't notice much improvement. The lesions come and go. No joint pain. No known family history of psoriasis. No other lesions of concern.    Past Medical History:   Patient Active Problem List   Diagnosis     Other psoriasis     Obesity     Essential hypertension with goal blood pressure less than 140/90     Past Medical History:   Diagnosis Date     Essential hypertension, benign 2000     Hypertension      Obesity      Other psoriasis     since childhood     Past Surgical History:   Procedure Laterality Date     CHOLECYSTECTOMY       LAPAROSCOPIC CHOLECYSTECTOMY  7/3/2012    Procedure: LAPAROSCOPIC CHOLECYSTECTOMY;  Laparoscopic Cholecystectomy;  Surgeon: Kulwinder Hollins MD;  Location:  OR     NO HISTORY OF SURGERY         Social History:  The patient works as an . The patient admits to use of tanning beds.    Family History:  There is no family history of skin cancer.    Medications:  Current Outpatient Prescriptions   Medication Sig Dispense Refill     calcipotriene 0.005 % OINT Apply to the affected area twice a day Mon-Fri when rash is relatively calm. 240 g 3     triamcinolone (KENALOG) 0.1 % ointment Apply to affected areas of the trunk twice a day  when flaring, M-F when improved, weekend only when relatively calm. 454 g 6     desonide (DESOWEN) 0.05 % ointment Apply to the affected area of the skin of groin or face daily for 2-3 week bursts as needed. 60 g 6     fluocinonide (LIDEX) 0.05 % cream Apply topically 2 times daily 120 g 1     calcipotriene (DOVONOX) 0.005 % cream Apply topically 2 times daily 120 g 1     hydrOXYzine (ATARAX) 25 MG tablet Take 1-2 tablets (25-50 mg) by mouth every 6 hours as needed for itching 60 tablet 1     Fexofenadine HCl (ALLEGRA PO) Reported on 5/19/2017       Pseudoephedrine-Ibuprofen (ADVIL COLD/SINUS PO) Reported on 5/19/2017       amLODIPine (NORVASC) 10 MG tablet Take 1 tablet by mouth  daily 90 tablet 3     benazepril (LOTENSIN) 40 MG tablet Take 1 tablet by mouth  daily 90 tablet 3       Allergies   Allergen Reactions     No Known Allergies        Review of Systems:  -Skin/Heme New Pt: The patient admits to prior blistering sunburns. The patient denies excessive scarring or problems healing except as per HPI. The patient denies excessive bleeding.  -Constitutional: The patient denies fatigue, fevers, chills, unintended weight loss, and night sweats.  -MSK: no joint pain.    Physical exam:  Vitals: There were no vitals taken for this visit.  GEN: This is a well developed, well-nourished male in no acute distress, in a pleasant mood.    NEURO: Alert and oriented  PSYCH: In pleasant mood, appropriate affect  SKIN: Focused examination of the back, chest, head/scalp, and upper and lower extremities was performed.  -scalp and ears are clear  -bilateral flanks there are well demarcated hyperpigmented patches  -at site of prior psoriasis, scattered, pink, well decmarcated papules   -lightly pink well demarcated papules to plaques on bilateral elbows with minimal scale  -knees clear  -No other lesions of concern on areas examined.       Impression/Plan:  1. Plaque psoriasis with post-inflammatory hyperpigmentation. 1-3% TBSA  right now. In relatively good control.    Maintenance:    Calcipotriene BID M-F, Body Triamcinolone 0.1% ointment BID weekends, Face/groin Desonide 0.05% ointment BID    Flares    Body triamcinolone 0.1% ointment BID x 2-3 weeks then taper to just M-F. Calcipotriene BID on weekends once tapered.    Face/groin Desonide 0.05% ointment BID x 2-3 weeks then taper to just M-F. Calcipotreiene BID on weekends once tapered       CC Jordyn Sexton MD, MD on close of this encounter.  Follow-up in 6 months, earlier for new or changing lesions.       Staff Involved:  Scribe/Staff    Scribe Disclosure:   I, Kevin Juares, am serving as a scribe to document services personally performed by Dr. Shon Gant, based on data collection and the provider's statements to me.     Provider Disclosure:   I have reviewed the documentation recorded by the scribe and have edited it as needed. I have personally performed the services documented here and the documentation accurately represents those services and the decisions made by me.     Shon Gant MD, MS    Department of Dermatology  [facility]: Phone: [phone], Fax:[phone]  [facility]: Phone: [phone], Fax: [phone]

## 2017-06-20 NOTE — NURSING NOTE
Dermatology Rooming Note    To White's goals for this visit include:   Chief Complaint   Patient presents with     Derm Problem     psorasis       Is a scribe okay for this visit:YES    Are records needed for this visit(If yes, obtain release of information): No     Vitals: There were no vitals taken for this visit.    Referring Provider:  Annel uYsuf MD  [address] [address]

## 2017-06-20 NOTE — MR AVS SNAPSHOT
After Visit Summary   6/20/2017    To White    MRN: [id number]           Patient Information     Date Of Birth          1970        Visit Information        Provider Department      6/20/2017 1:30 PM Shon Gant MD [facility]        Today's Diagnoses     Plaque psoriasis    -  1       Follow-ups after your visit        Your next 10 appointments already scheduled     Dec 20, 2017  1:45 PM CST   Return Visit with Shon Gant MD   [facility] ([facility])    [address] [place] [address] [phone]              Who to contact     If you have questions or need follow up information about today's clinic visit or your schedule please contact [facility] directly at [phone].  Normal or non-critical lab and imaging results will be communicated to you by [employer], letter or phone within 4 business days after the clinic has received the results. If you do not hear from us within 7 days, please contact the clinic through [employer] or phone. If you have a critical or abnormal lab result, we will notify you by phone as soon as possible.  Submit refill requests through [employer] or call your pharmacy and they will forward the refill request to us. Please allow 3 business days for your refill to be completed.          Additional Information About Your Visit        MyChart Information     [employer] gives you secure access to your electronic health record. If you see a primary care provider, you can also send messages to your care team and make appointments. If you have questions, please call your primary care clinic.  If you do not have a primary care provider, please call [phone] and they will assist you.      [employer] is an electronic gateway that provides easy, online access to your medical records. With [employer], you can request a clinic appointment, read your test results, renew a prescription or  communicate with your care team.     To access your existing account, please contact your [facility] or call [phone] for assistance.        Care EveryWhere ID     This is your Care EveryWhere ID. This could be used by other organizations to access your [place] medical records  [id number]         Blood Pressure from Last 3 Encounters:   05/19/17 126/74   05/10/17 113/80   04/13/17 120/76    Weight from Last 3 Encounters:   05/19/17 118.4 kg (261 lb)   04/13/17 116.1 kg (256 lb)   08/08/16 121.9 kg (268 lb 11.2 oz)              Today, you had the following     No orders found for display         Today's Medication Changes          These changes are accurate as of: 6/20/17  1:38 PM.  If you have any questions, ask your nurse or doctor.               Start taking these medicines.        Dose/Directions    desonide 0.05 % ointment   Commonly known as:  DESOWEN   Used for:  Plaque psoriasis        Apply to the affected area of the skin of groin or face daily for 2-3 week bursts as needed.   Quantity:  60 g   Refills:  6       triamcinolone 0.1 % ointment   Commonly known as:  KENALOG   Used for:  Plaque psoriasis        Apply to affected areas of the trunk twice a day when flaring, M-F when improved, weekend only when relatively calm.   Quantity:  454 g   Refills:  6         These medicines have changed or have updated prescriptions.        Dose/Directions    * calcipotriene 0.005 % cream   Commonly known as:  DOVONOX   This may have changed:  Another medication with the same name was added. Make sure you understand how and when to take each.   Used for:  Other psoriasis        Apply topically 2 times daily   Quantity:  120 g   Refills:  1       * calcipotriene 0.005 % Oint   This may have changed:  You were already taking a medication with the same name, and this prescription was added. Make sure you understand how and when to take each.   Used for:  Plaque psoriasis        Apply to  the affected area twice a day Mon-Fri when rash is relatively calm.   Quantity:  240 g   Refills:  3       * Notice:  This list has 2 medication(s) that are the same as other medications prescribed for you. Read the directions carefully, and ask your doctor or other care provider to review them with you.         Where to get your medicines      These medications were sent to [facility] - [address], [facility] [address]     Phone:  [phone]     calcipotriene 0.005 % Oint    desonide 0.05 % ointment    triamcinolone 0.1 % ointment                Primary Care Provider Office Phone # Fax #    Jordyn Molly Sexton [phone]       [facility] [address] [place] [address]        Thank you!     Thank you for choosing [facility]  for your care. Our goal is always to provide you with excellent care. Hearing back from our patients is one way we can continue to improve our services. Please take a few minutes to complete the written survey that you may receive in the mail after your visit with us. Thank you!             Your Updated Medication List - Protect others around you: Learn how to safely use, store and throw away your medicines at www.disposemymeds.org.          This list is accurate as of: 6/20/17  1:38 PM.  Always use your most recent med list.                   Brand Name Dispense Instructions for use    ADVIL COLD/SINUS PO      Reported on 5/19/2017       ALLEGRA PO      Reported on 5/19/2017       amLODIPine 10 MG tablet    NORVASC    90 tablet    Take 1 tablet by mouth  daily       benazepril 40 MG tablet    LOTENSIN    90 tablet    Take 1 tablet by mouth  daily       * calcipotriene 0.005 % cream    DOVONOX    120 g    Apply topically 2 times daily       * calcipotriene 0.005 % Oint     240 g    Apply to the affected area twice a day Mon-Fri when rash is relatively calm.       desonide  0.05 % ointment    DESOWEN    60 g    Apply to the affected area of the skin of groin or face daily for 2-3 week bursts as needed.       fluocinonide 0.05 % cream    LIDEX    120 g    Apply topically 2 times daily       hydrOXYzine 25 MG tablet    ATARAX    60 tablet    Take 1-2 tablets (25-50 mg) by mouth every 6 hours as needed for itching       triamcinolone 0.1 % ointment    KENALOG    454 g    Apply to affected areas of the trunk twice a day when flaring, M-F when improved, weekend only when relatively calm.       * Notice:  This list has 2 medication(s) that are the same as other medications prescribed for you. Read the directions carefully, and ask your doctor or other care provider to review them with you.

## 2017-06-20 NOTE — LETTER
6/20/2017       RE: To White  [address] [address] [place] [address]     Dear Colleague,    Thank you for referring your patient, To White, to the [facility]. Please see a copy of my visit note below.    [facility] Dermatology Note      Dermatology Problem List:  1.Plaque psoriasis: 1-3% TBSA, well controlled now. Potential nbUVB.  -triamcinolone 0.1% ointment, desonide 0.05% ointment, calcipotriene 0.005% ointment    Encounter Date: Jun 20, 2017    CC:  Chief Complaint   Patient presents with     Derm Problem     psorasis         History of Present Illness:  Mr. To White is a 47 year old male who was referred by Dr. Annel Yusuf presents for evaluation of psoriasis. He has had the spots for most of his life. Most days he scraps the lesions down with a buff pad. As of 5/19/2017 he uses calcipotriene 0.005% cream and fluocinonide 0.05% cream daily or every other day when he remembers. He has had prior light treatment and didn't notice much improvement. The lesions come and go. No joint pain. No known family history of psoriasis. No other lesions of concern.    Past Medical History:   Patient Active Problem List   Diagnosis     Other psoriasis     Obesity     Essential hypertension with goal blood pressure less than 140/90     Past Medical History:   Diagnosis Date     Essential hypertension, benign 2000     Hypertension      Obesity      Other psoriasis     since childhood     Past Surgical History:   Procedure Laterality Date     CHOLECYSTECTOMY       LAPAROSCOPIC CHOLECYSTECTOMY  7/3/2012    Procedure: LAPAROSCOPIC CHOLECYSTECTOMY;  Laparoscopic Cholecystectomy;  Surgeon: Kulwinder Hollins MD;  Location:  OR     NO HISTORY OF SURGERY         Social History:  The patient works as an . The patient admits to use of tanning beds.    Family History:  There is no family history of skin  cancer.    Medications:  Current Outpatient Prescriptions   Medication Sig Dispense Refill     calcipotriene 0.005 % OINT Apply to the affected area twice a day Mon-Fri when rash is relatively calm. 240 g 3     triamcinolone (KENALOG) 0.1 % ointment Apply to affected areas of the trunk twice a day when flaring, M-F when improved, weekend only when relatively calm. 454 g 6     desonide (DESOWEN) 0.05 % ointment Apply to the affected area of the skin of groin or face daily for 2-3 week bursts as needed. 60 g 6     fluocinonide (LIDEX) 0.05 % cream Apply topically 2 times daily 120 g 1     calcipotriene (DOVONOX) 0.005 % cream Apply topically 2 times daily 120 g 1     hydrOXYzine (ATARAX) 25 MG tablet Take 1-2 tablets (25-50 mg) by mouth every 6 hours as needed for itching 60 tablet 1     Fexofenadine HCl (ALLEGRA PO) Reported on 5/19/2017       Pseudoephedrine-Ibuprofen (ADVIL COLD/SINUS PO) Reported on 5/19/2017       amLODIPine (NORVASC) 10 MG tablet Take 1 tablet by mouth  daily 90 tablet 3     benazepril (LOTENSIN) 40 MG tablet Take 1 tablet by mouth  daily 90 tablet 3       Allergies   Allergen Reactions     No Known Allergies        Review of Systems:  -Skin/Heme New Pt: The patient admits to prior blistering sunburns. The patient denies excessive scarring or problems healing except as per HPI. The patient denies excessive bleeding.  -Constitutional: The patient denies fatigue, fevers, chills, unintended weight loss, and night sweats.  -MSK: no joint pain.    Physical exam:  Vitals: There were no vitals taken for this visit.  GEN: This is a well developed, well-nourished male in no acute distress, in a pleasant mood.    NEURO: Alert and oriented  PSYCH: In pleasant mood, appropriate affect  SKIN: Focused examination of the back, chest, head/scalp, and upper and lower extremities was performed.  -scalp and ears are clear  -bilateral flanks there are well demarcated hyperpigmented patches  -at site of prior  psoriasis, scattered, pink, well decmarcated papules   -lightly pink well demarcated papules to plaques on bilateral elbows with minimal scale  -knees clear  -No other lesions of concern on areas examined.       Impression/Plan:  1. Plaque psoriasis with post-inflammatory hyperpigmentation. 1-3% TBSA right now. In relatively good control.    Maintenance:    Calcipotriene BID M-F, Body Triamcinolone 0.1% ointment BID weekends, Face/groin Desonide 0.05% ointment BID    Flares    Body triamcinolone 0.1% ointment BID x 2-3 weeks then taper to just M-F. Calcipotriene BID on weekends once tapered.    Face/groin Desonide 0.05% ointment BID x 2-3 weeks then taper to just M-F. Calcipotreiene BID on weekends once tapered       CARLIE Sexton MD, MD on close of this encounter.  Follow-up in 6 months, earlier for new or changing lesions.       Staff Involved:  Scribe/Staff    Scribe Disclosure:   I, Kevin Juares, am serving as a scribe to document services personally performed by Dr. Shon Gant, based on data collection and the provider's statements to me.     Provider Disclosure:   I have reviewed the documentation recorded by the scribe and have edited it as needed. I have personally performed the services documented here and the documentation accurately represents those services and the decisions made by me.     Shon Gant MD, MS    Department of Dermatology  [facility]: Phone: [phone], Fax:[phone]  [facility]: Phone: [phone], Fax: [phone]          Again, thank you for allowing me to participate in the care of your patient.      Sincerely,    Shon Gant MD

## 2017-07-24 DIAGNOSIS — I10 ESSENTIAL HYPERTENSION WITH GOAL BLOOD PRESSURE LESS THAN 140/90: ICD-10-CM

## 2017-07-24 NOTE — TELEPHONE ENCOUNTER
Amlodipine 10 MG      Last Written Prescription Date: 8/8/16  Last Fill Quantity: 90, # refills: 3  Last Office Visit with [facility], [facility] or [facility] prescribing provider: 5/16/17       Potassium   Date Value Ref Range Status   08/08/2016 3.9 3.4 - 5.3 mmol/L Final     Creatinine   Date Value Ref Range Status   08/08/2016 0.97 0.66 - 1.25 mg/dL Final     BP Readings from Last 3 Encounters:   05/19/17 126/74   05/10/17 113/80   04/13/17 120/76       Benazepril (Lotensin) 40 MG      Last Written Prescription Date: 8/8/16  Last Fill Quantity: 90, # refills: 3  Last Office Visit with [facility], [facility] or [facility] prescribing provider: 5/16/17       Potassium   Date Value Ref Range Status   08/08/2016 3.9 3.4 - 5.3 mmol/L Final     Creatinine   Date Value Ref Range Status   08/08/2016 0.97 0.66 - 1.25 mg/dL Final     BP Readings from Last 3 Encounters:   05/19/17 126/74   05/10/17 113/80   04/13/17 120/76

## 2017-07-25 RX ORDER — BENAZEPRIL HYDROCHLORIDE 40 MG/1
TABLET ORAL
Qty: 90 TABLET | Refills: 0 | Status: SHIPPED | OUTPATIENT
Start: 2017-07-25 | End: 2017-10-16

## 2017-07-25 RX ORDER — AMLODIPINE BESYLATE 10 MG/1
TABLET ORAL
Qty: 90 TABLET | Refills: 2 | Status: SHIPPED | OUTPATIENT
Start: 2017-07-25 | End: 2018-01-19

## 2017-07-25 NOTE — TELEPHONE ENCOUNTER
Amlodipine  Prescription approved per [facility] Refill Protocol.    Benazepril  Medication is being filled for 1 time refill only due to:  Patient needs labs K+, Cr.      Ethel Weathers, RN, BSN

## 2017-10-09 ENCOUNTER — TELEPHONE (OUTPATIENT)
Dept: DERMATOLOGY | Facility: CLINIC | Age: 47
End: 2017-10-09

## 2017-10-09 NOTE — TELEPHONE ENCOUNTER
Talked with wife, Patient  Does need to reschedule dec 19th appt. She stated he will respond faster if you send a mychart. This cma sent a mychart.     Patt Parra CMA

## 2017-10-16 DIAGNOSIS — I10 ESSENTIAL HYPERTENSION WITH GOAL BLOOD PRESSURE LESS THAN 140/90: ICD-10-CM

## 2017-10-16 NOTE — TELEPHONE ENCOUNTER
benazepril (LOTENSIN) 40 MG tablet      Last Written Prescription Date: 7/25/17  Last Fill Quantity: 90, # refills: 0  Last Office Visit with [facility] or [facility] prescribing provider: 5/19/17 LINDA  Next 5 appointments (look out 90 days)     Dec 19, 2017  2:15 PM CST   [facility]    [address] [place] [address] [phone]                   Potassium   Date Value Ref Range Status   08/08/2016 3.9 3.4 - 5.3 mmol/L Final     Creatinine   Date Value Ref Range Status   08/08/2016 0.97 0.66 - 1.25 mg/dL Final     BP Readings from Last 3 Encounters:   05/19/17 126/74   05/10/17 113/80   04/13/17 120/76

## 2017-10-16 NOTE — LETTER
To White  [address] [address] [place] [id number]    October 19, 2017      Dear To White    APPOINTMENT REMINDER:   Our records indicates that it is time for you to be seen for a recheck.     Your current medication request will be approved for one refill but you will need to be seen before any additional refills can be approved.  Taking care of your health is important to us, and ongoing visits with your provider are vital to your care.    We look forward to seeing you in the near future.  You may call our office at [phone] to schedule a visit.     Please disregard this notice if you have already made an appointment.      Sincerely,     [facility] Team    [facility]  [address] [facility] [address]  Phone: [phone]

## 2017-10-16 NOTE — TELEPHONE ENCOUNTER
Left message for patient to call [facility] in [place] back at [phone] in regards to needing to reschedule 12-19 appointment.    Stacy Rosen LPN

## 2017-10-17 ENCOUNTER — TELEPHONE (OUTPATIENT)
Dept: DERMATOLOGY | Facility: CLINIC | Age: 47
End: 2017-10-17

## 2017-10-17 NOTE — TELEPHONE ENCOUNTER
Routing refill request to provider for review/approval because:   Patient needs to be seen because needs to est care with new provider, overdue for labs.       Shilo Dewey, RN, BSN

## 2017-10-17 NOTE — TELEPHONE ENCOUNTER
I left a message for patient to call [facility].  Offer to reschedule March 2018 appt in January with Dr. Cervantes.  Dr. Gant requested patient to be seen in 6 months for psoriasis follow up, which would be around 12/20/17.  Mariia Velasco RN

## 2017-10-18 RX ORDER — BENAZEPRIL HYDROCHLORIDE 40 MG/1
TABLET ORAL
Qty: 30 TABLET | Refills: 0 | Status: SHIPPED | OUTPATIENT
Start: 2017-10-18 | End: 2017-10-25

## 2017-10-18 NOTE — TELEPHONE ENCOUNTER
Your medication has been refilled.  Please schedule a visit to follow up on how this medication is working for you before your next refill.  We need to be sure everything is working well and stable prior to further refills.

## 2017-10-19 NOTE — TELEPHONE ENCOUNTER
2nd attempt to contact pt.  No answer.  Message left on home voice mail to return call to [facility] at [phone].    Fatuma Burkett LPN

## 2017-10-23 ENCOUNTER — TELEPHONE (OUTPATIENT)
Dept: DERMATOLOGY | Facility: CLINIC | Age: 47
End: 2017-10-23

## 2017-10-23 NOTE — TELEPHONE ENCOUNTER
Patient on wait list. Patient scheduled for psoriasis follow up in March and due for a follow up in December. Left message for patient to call [facility] in [place] back at [phone] so we can assist in making his appointment closer to December.     Stacy Rosen LPN

## 2017-10-25 DIAGNOSIS — I10 ESSENTIAL HYPERTENSION WITH GOAL BLOOD PRESSURE LESS THAN 140/90: ICD-10-CM

## 2017-10-25 NOTE — TELEPHONE ENCOUNTER
Pt called, No answer.  Left general message for pt to call the [facility] clinic back at [phone]...Dorothy Oneal RN

## 2017-10-27 RX ORDER — BENAZEPRIL HYDROCHLORIDE 40 MG/1
TABLET ORAL
Qty: 15 TABLET | Refills: 0 | Status: SHIPPED | OUTPATIENT
Start: 2017-10-27 | End: 2018-01-19

## 2017-10-27 NOTE — TELEPHONE ENCOUNTER
Routing refill request to provider for review/approval because:  A 30 day supply was sent on 10/18/17 and a message from Dr. Reyes stating that pt needs to follow up in clinic before next refill.    Iona Dee RN

## 2017-12-28 ENCOUNTER — OFFICE VISIT (OUTPATIENT)
Dept: DERMATOLOGY | Facility: CLINIC | Age: 47
End: 2017-12-28
Payer: COMMERCIAL

## 2017-12-28 DIAGNOSIS — Z51.81 MEDICATION MONITORING ENCOUNTER: ICD-10-CM

## 2017-12-28 DIAGNOSIS — L40.9 PSORIASIS: Primary | ICD-10-CM

## 2017-12-28 LAB
ALBUMIN SERPL-MCNC: 4.1 G/DL (ref 3.4–5)
ALP SERPL-CCNC: 90 U/L (ref 40–150)
ALT SERPL W P-5'-P-CCNC: 30 U/L (ref 0–70)
ANION GAP SERPL CALCULATED.3IONS-SCNC: 7 MMOL/L (ref 3–14)
AST SERPL W P-5'-P-CCNC: 20 U/L (ref 0–45)
BASOPHILS # BLD AUTO: 0.1 10E9/L (ref 0–0.2)
BASOPHILS NFR BLD AUTO: 0.7 %
BILIRUB SERPL-MCNC: 0.3 MG/DL (ref 0.2–1.3)
BUN SERPL-MCNC: 10 MG/DL (ref 7–30)
CALCIUM SERPL-MCNC: 9 MG/DL (ref 8.5–10.1)
CHLORIDE SERPL-SCNC: 103 MMOL/L (ref 94–109)
CHOLEST SERPL-MCNC: 228 MG/DL
CO2 SERPL-SCNC: 28 MMOL/L (ref 20–32)
CREAT SERPL-MCNC: 0.98 MG/DL (ref 0.66–1.25)
DIFFERENTIAL METHOD BLD: ABNORMAL
EOSINOPHIL # BLD AUTO: 0.6 10E9/L (ref 0–0.7)
EOSINOPHIL NFR BLD AUTO: 5.3 %
ERYTHROCYTE [DISTWIDTH] IN BLOOD BY AUTOMATED COUNT: 13 % (ref 10–15)
GFR SERPL CREATININE-BSD FRML MDRD: 82 ML/MIN/1.7M2
GLUCOSE SERPL-MCNC: 100 MG/DL (ref 70–99)
HCT VFR BLD AUTO: 44.9 % (ref 40–53)
HDLC SERPL-MCNC: 63 MG/DL
HGB BLD-MCNC: 15 G/DL (ref 13.3–17.7)
IMM GRANULOCYTES # BLD: 0.2 10E9/L (ref 0–0.4)
IMM GRANULOCYTES NFR BLD: 1.2 %
LDLC SERPL CALC-MCNC: 130 MG/DL
LYMPHOCYTES # BLD AUTO: 3.8 10E9/L (ref 0.8–5.3)
LYMPHOCYTES NFR BLD AUTO: 30.8 %
MCH RBC QN AUTO: 30.3 PG (ref 26.5–33)
MCHC RBC AUTO-ENTMCNC: 33.4 G/DL (ref 31.5–36.5)
MCV RBC AUTO: 91 FL (ref 78–100)
MONOCYTES # BLD AUTO: 0.9 10E9/L (ref 0–1.3)
MONOCYTES NFR BLD AUTO: 7.1 %
NEUTROPHILS # BLD AUTO: 6.7 10E9/L (ref 1.6–8.3)
NEUTROPHILS NFR BLD AUTO: 54.9 %
NONHDLC SERPL-MCNC: 165 MG/DL
PLATELET # BLD AUTO: 392 10E9/L (ref 150–450)
POTASSIUM SERPL-SCNC: 3.8 MMOL/L (ref 3.4–5.3)
PROT SERPL-MCNC: 8.2 G/DL (ref 6.8–8.8)
RBC # BLD AUTO: 4.95 10E12/L (ref 4.4–5.9)
SODIUM SERPL-SCNC: 138 MMOL/L (ref 133–144)
TRIGL SERPL-MCNC: 174 MG/DL
WBC # BLD AUTO: 12.2 10E9/L (ref 4–11)

## 2017-12-28 PROCEDURE — 80061 LIPID PANEL: CPT | Performed by: DERMATOLOGY

## 2017-12-28 PROCEDURE — 85025 COMPLETE CBC W/AUTO DIFF WBC: CPT | Performed by: DERMATOLOGY

## 2017-12-28 PROCEDURE — 99213 OFFICE O/P EST LOW 20 MIN: CPT | Performed by: DERMATOLOGY

## 2017-12-28 PROCEDURE — 36415 COLL VENOUS BLD VENIPUNCTURE: CPT | Performed by: DERMATOLOGY

## 2017-12-28 PROCEDURE — 80053 COMPREHEN METABOLIC PANEL: CPT | Performed by: DERMATOLOGY

## 2017-12-28 RX ORDER — ACITRETIN 25 MG/1
25 CAPSULE ORAL
Qty: 90 CAPSULE | Refills: 0 | Status: SHIPPED | OUTPATIENT
Start: 2017-12-28 | End: 2018-01-29

## 2017-12-28 ASSESSMENT — PAIN SCALES - GENERAL: PAINLEVEL: NO PAIN (0)

## 2017-12-28 NOTE — PROGRESS NOTES
[facility] Dermatology Note      Dermatology Problem List:  1.Plaque psoriasis: 1-3% TBSA  -Current Tx: Soriatane 25 mg daily, triamcinolone 0.1% ointment, desonide 0.05% ointment, calcipotriene 0.005% ointment  2. Onycholysis of distal right toenails, May be in association with psoriasis.    Encounter Date: Dec 28, 2017    CC:  Chief Complaint   Patient presents with     RECHECK     psorasis, its getting worse more itchy ans spreading, creams not helping as much anymore          History of Present Illness:  Mr. To White is a 47 year old male who was referred by Dr. Annel Yusuf presents for evaluation of psoriasis. He was last seen 6/20/17 when her was given a regimen of creams for his psoriasis during flares or maintenance.Today he notes that his psoriasis is flaring and not responding to the non steroid creams. The patient notes that he has no joint problems.     No other lesions of concern.    Past Medical History:   Patient Active Problem List   Diagnosis     Other psoriasis     Obesity     Essential hypertension with goal blood pressure less than 140/90     Past Medical History:   Diagnosis Date     Essential hypertension, benign 2000     Hypertension      Obesity      Other psoriasis     since childhood     Past Surgical History:   Procedure Laterality Date     CHOLECYSTECTOMY       LAPAROSCOPIC CHOLECYSTECTOMY  7/3/2012    Procedure: LAPAROSCOPIC CHOLECYSTECTOMY;  Laparoscopic Cholecystectomy;  Surgeon: Kulwinder Hollins MD;  Location:  OR     NO HISTORY OF SURGERY         Social History:  The patient works as an . The patient admits to use of tanning beds.    Family History:  There is no family history of skin cancer.    Medications:  Current Outpatient Prescriptions   Medication Sig Dispense Refill     benazepril (LOTENSIN) 40 MG tablet TAKE 1 TABLET BY MOUTH  DAILY 15 tablet 0     amLODIPine (NORVASC) 10 MG tablet Take 1 tablet by mouth  daily 90 tablet 2      calcipotriene 0.005 % OINT Apply to the affected area twice a day Mon-Fri when rash is relatively calm. 240 g 3     triamcinolone (KENALOG) 0.1 % ointment Apply to affected areas of the trunk twice a day when flaring, M-F when improved, weekend only when relatively calm. 454 g 6     desonide (DESOWEN) 0.05 % ointment Apply to the affected area of the skin of groin or face daily for 2-3 week bursts as needed. 60 g 6     fluocinonide (LIDEX) 0.05 % cream Apply topically 2 times daily 120 g 1     calcipotriene (DOVONOX) 0.005 % cream Apply topically 2 times daily 120 g 1     hydrOXYzine (ATARAX) 25 MG tablet Take 1-2 tablets (25-50 mg) by mouth every 6 hours as needed for itching 60 tablet 1     Fexofenadine HCl (ALLEGRA PO) Reported on 5/19/2017       Pseudoephedrine-Ibuprofen (ADVIL COLD/SINUS PO) Reported on 5/19/2017         Allergies   Allergen Reactions     No Known Allergies        Review of Systems:  -Skin/Heme New Pt: The patient admits to prior blistering sunburns. The patient denies excessive scarring or problems healing except as per HPI. The patient denies excessive bleeding.  -Constitutional: The patient denies fatigue, fevers, chills, unintended weight loss, and night sweats.  -MSK: no joint pain.    Physical exam:  Vitals: There were no vitals taken for this visit.  GEN: This is a well developed, well-nourished male in no acute distress, in a pleasant mood.    NEURO: Alert and oriented  PSYCH: In pleasant mood, appropriate affect  SKIN: Waist-up skin, which includes the head/face, neck, both arms, chest, back, abdomen, digits and/or nails was examined.Including his right toes  - There are thin erythematous well demarcated plaques with silvery micaceous scale on the back, chest, elbows, buttocks and flanks.  -Onycholysis of his distal right toenails   -No other lesions of concern on areas examined.       Impression/Plan:  1. Plaque psoriasis with post-inflammatory hyperpigmentation. Spreading  Reviewed  side effects including but not limited to liver enzyme and cholesterol elevations as well as hematologic side effects, hair loss and drying of the skin/mucous membranes, sticky sensation of skin, nail fragility, DISH, pancreatitis and pyogenic granuloma. Medications list reviewed for tetracyclines.   Discussed association of psoriasis with weight, arthritis, cardiovascular disease, depression, alcohol and smoking. Recommend regular follow-up with PCP.   Start Soriatane 25mg daily. Baseline CBC with diff, bun/cralt/ast, fasting lipid panel first. Will recheck fasting lipids Q2 weeks until stable. Advised to use protection while sexually active and to stop any alcohol consumption while on soriatane.     Maintenance:    Continue, Calcipotriene BID M-F, for the Body,     Continue, Triamcinolone 0.1% ointment BID weekends,     Continue, Face/groin Desonide 0.05% ointment BID    Flares    Continue, Body triamcinolone 0.1% ointment BID x 2-3 weeks then taper to just M-F.     Continue, Calcipotriene BID on weekends once tapered.    Continue, Face/groin Desonide 0.05% ointment BID x 2-3 weeks then taper to just M-F.    Continue, Calcipotreiene BID on weekends once tapered     2. Onycholysis of distal right toenails, May be in association with psoriasis.   Will monitor for changes, may improve with soriatane .     Follow-up in 4 months, earlier for new or changing lesions.       Staff Involved:  Scribe/Staff    Scribe Disclosure:   I, Carolin Mckeon, am serving as a scribe to document services personally performed by Dr. Jacey Cervantes, based on data collection and the provider's statements to me.         Provider Disclosure:   The documentation recorded by the scribe accurately reflects the services I personally performed and the decisions made by me.    Jacey Cervantes MD    Department of Dermatology  [facility]: Phone: [phone],  Fax:[phone]  [facility]: Phone: [phone], Fax: [phone]

## 2017-12-28 NOTE — MR AVS SNAPSHOT
After Visit Summary   12/28/2017    To White    MRN: [id number]           Patient Information     Date Of Birth          1970        Visit Information        Provider Department      12/28/2017 8:30 AM Jacey Cervantes MD [facility]        Today's Diagnoses     Psoriasis    -  1    Medication monitoring encounter          Care Instructions      Soriatane (acitretin)     What is it?   Soriatane (acitretin) is an oral retinoid, which is a synthetic form of vitamin A. Synthetic retinoids were approved in the [place] in the 1980s. Soriatane is the only oral retinoid approved by the FDA specifically for treating psoriasis. Isotretinoin is another oral retinoid that is sometimes used instead of Soriatane to treat psoriasis.     Side effects     Hair loss     Chapped lips and dry mouth     Dry skin and dry eyes     Bleeding gums and nose bleeds     Increased sensitivity to sunlight     Peeling fingertips and nail changes     Changes in blood fat levels     Depression     Aggressive thoughts     Headache     Joint pain     Decreased night vision     Elevated liver enzymes     These side effects, and others, seem to be dose dependent. This means they tend to go away after stopping the medication or lowering the dosage.     How does it work?   The exact way Soriatane works to control psoriasis is unknown. In general, retinoids affect how cells regulate their behavior. Retinoids help control the multiplication of cells. This includes the speed at which skin cells will grow and shed from the skin s surface, which speeds up in psoriasis.     Who can take it?   Soriatane is indicated for use in adults with severe psoriasis. The Soriatane label supports the use of the drug for plaque, guttate, pustular, erythrodermic and palmoplantar psoriasis.     Who should not take it?     Pregnant women or women who might become pregnant during treatment     Women who are breastfeeding     People  with severe liver or kidney disease     People with repeatedly high levels of fat in the blood that cannot be controlled with other medications     People who are allergic to or have hypersensitivity to retinoids     How is it used?   Soriatane comes in 10 milligram and 25 milligram capsules. The prescribed dose is taken once a day and should be taken with food. Several factors determine the dosage for each individual, including the type of psoriasis present. Doses may be changed or reduced after symptoms improve, depending on the person s response.     Can it be used with other treatments?   Soriatane is most effective for treating psoriasis when it is used with phototherapy, rather than by itself. Combination therapy can speed clearing and help reduce the amount of phototherapy needed to clear symptoms. This reduces the risks and side effects of both treatments. Soriatane is sometimes used with Enbrel (etanercept) and Remicade (infliximab) to achieve clearing of psoriasis.   Soriatane may also be prescribed in rotation with other systemic medications, such as cyclosporine or methotrexate.    Effectiveness    Soriatane tends to work slowly for plaque psoriasis. Psoriasis may worsen before individuals start to see clearing. After eight to 16 weeks of treatment, skin lesions usually will improve. It may take up to six months for the drug to reach its peak effect.     Risks   The most serious is the risk of severe birth defects in developing fetuses if the mother has the drug in her body during pregnancy. Soriatane can remain in the body for many months so it is not to be taken during or for three years before pregnancy. Because of the risk of birth defects, women of childbearing potential must have two negative pregnancy tests before starting Soriatane. They must use two effective forms of birth control at least one month before beginning treatment, while taking the drug and for three years after stopping treatment.  Women who become pregnant during the three years following treatment should seek the advice of a doctor who specializes in high-risk pregnancies. Progestin-only birth control pills may not work while taking Soriatane, so women should avoid using them as a primary form of birth control.   Individuals should not donate blood during treatment and for three years after stopping treatment. Donated blood could expose pregnant women to Soriatane.   Your doctor should always be aware of any other medications, therapies or supplements you are using. Avoid dietary supplements with vitamin A. Soriatane is related to vitamin A, and taking vitamin A could add to the drug s unwanted effects.   Women of childbearing potential who use Soriatane must not drink or eat any substance containing alcohol during treatment and for two months after treatment is stopped. Alcohol can cause Soriatane to convert to a form that is only slowly removed from the body. This increases the risk of birth defects if the woman were to become pregnant.   Soriatane can reduce the effectiveness of phenytoin, a common drug for epilepsy, when given concurrently. It should not be combined with tetracycline (an antibiotic), since both medications can cause increased pressure on the brain, which can have serious consequences.   For detailed information on side effects and safety, talk to your doctor.   Financial assistance information     [employer] provides access to Soriatane for certain patients who need help paying for it. Call 1.785.372.4477 for more information.                                                                                                                                                                                 [address]   [place] [address]   [phone]   [email or web address]   www.psoriasis.org   National Psoriasis Foundation educational materials are medically reviewed and are not intended to  replace the  of a physician. The Psoriasis Foundation does not endorse any medications, products or treatments for psoriasis or psoriatic arthritis and advises you to consult a physician before initiating any treatment.     National Psoriasis Foundation November 2015 - TOM             Follow-ups after your visit        Follow-up notes from your care team     Return in about 3 months (around 3/28/2018) for psoriasis, labs today, in 2 weeks and 4 weeks. .      Your next 10 appointments already scheduled     Mar 29, 2018  4:30 PM CDT   Return Visit with Jacey Cervantes MD   [facility]    [address] [place] [id number] [phone]              Future tests that were ordered for you today     Open Standing Orders        Priority Remaining Interval Expires Ordered    Lipid Profile Routine 4/4 2 weeks 2/28/2018 12/28/2017    CBC with platelets differential Routine 4/4 2 weeks 2/28/2018 12/28/2017    Comprehensive metabolic panel Routine 4/4 2 weeks 2/28/2018 12/28/2017            Who to contact     If you have questions or need follow up information about today's clinic visit or your schedule please contact [facility] directly at [phone].  Normal or non-critical lab and imaging results will be communicated to you by MyChart, letter or phone within 4 business days after the clinic has received the results. If you do not hear from us within 7 days, please contact the clinic through MyChart or phone. If you have a critical or abnormal lab result, we will notify you by phone as soon as possible.  Submit refill requests through [employer] or call your pharmacy and they will forward the refill request to us. Please allow 3 business days for your refill to be completed.          Additional Information About Your Visit        [employer] Information     [employer] gives you secure access to your electronic health record. If you see a primary care  provider, you can also send messages to your care team and make appointments. If you have questions, please call your primary care clinic.  If you do not have a primary care provider, please call [phone] and they will assist you.      [employer] is an electronic gateway that provides easy, online access to your medical records. With [employer], you can request a clinic appointment, read your test results, renew a prescription or communicate with your care team.     To access your existing account, please contact your [facility] or call [phone] for assistance.        Care EveryWhere ID     This is your Care EveryWhere ID. This could be used by other organizations to access your [facility] medical records  [id number]         Blood Pressure from Last 3 Encounters:   05/19/17 126/74   05/10/17 113/80   04/13/17 120/76    Weight from Last 3 Encounters:   05/19/17 118.4 kg (261 lb)   04/13/17 116.1 kg (256 lb)   08/08/16 121.9 kg (268 lb 11.2 oz)               Primary Care Provider Fax #    Physician No Ref-Primary [phone]       No address on file        Equal Access to Services     BHUPENDRA JOHNSON : Hadii crystal huizar hadasho Sohiginioali, waaxda luqadaha, qaybta kaalmada adepachecoyada, beni gaines . So [facility] [phone].    ATENCIÓN: Si habla español, tiene a wtakins disposición servicios gratuitos de asistencia lingüística. Llame al [phone].    We comply with applicable federal civil rights laws and [place] laws. We do not discriminate on the basis of race, color, national origin, age, disability, sex, sexual orientation, or gender identity.            Thank you!     Thank you for choosing [facility]  for your care. Our goal is always to provide you with excellent care. Hearing back from our patients is one way we can continue to improve our services. Please take a few minutes to complete the written survey that you may receive in the mail after  your visit with us. Thank you!             Your Updated Medication List - Protect others around you: Learn how to safely use, store and throw away your medicines at www.disposemymeds.org.          This list is accurate as of: 12/28/17  8:57 AM.  Always use your most recent med list.                   Brand Name Dispense Instructions for use Diagnosis    ADVIL COLD/SINUS PO      Reported on 5/19/2017        ALLEGRA PO      Reported on 5/19/2017        amLODIPine 10 MG tablet    NORVASC    90 tablet    Take 1 tablet by mouth  daily    Essential hypertension with goal blood pressure less than 140/90       benazepril 40 MG tablet    LOTENSIN    15 tablet    TAKE 1 TABLET BY MOUTH  DAILY    Essential hypertension with goal blood pressure less than 140/90       * calcipotriene 0.005 % cream    DOVONOX    120 g    Apply topically 2 times daily    Other psoriasis       * calcipotriene 0.005 % Oint     240 g    Apply to the affected area twice a day Mon-Fri when rash is relatively calm.    Plaque psoriasis       desonide 0.05 % ointment    DESOWEN    60 g    Apply to the affected area of the skin of groin or face daily for 2-3 week bursts as needed.    Plaque psoriasis       fluocinonide 0.05 % cream    LIDEX    120 g    Apply topically 2 times daily    Other psoriasis       hydrOXYzine 25 MG tablet    ATARAX    60 tablet    Take 1-2 tablets (25-50 mg) by mouth every 6 hours as needed for itching    Itching       triamcinolone 0.1 % ointment    KENALOG    454 g    Apply to affected areas of the trunk twice a day when flaring, M-F when improved, weekend only when relatively calm.    Plaque psoriasis       * Notice:  This list has 2 medication(s) that are the same as other medications prescribed for you. Read the directions carefully, and ask your doctor or other care provider to review them with you.

## 2017-12-28 NOTE — NURSING NOTE
Dermatology Rooming Note    To White's goals for this visit include:   Chief Complaint   Patient presents with     RECHECK     psorasis, its getting worse more itchy ans spreading        Is a scribe okay for this visit:YES    Are records needed for this visit(If yes, obtain release of information): No     Vitals: There were no vitals taken for this visit.    Referring Provider:  No referring provider defined for this encounter.

## 2017-12-28 NOTE — LETTER
12/28/2017       RE: To White  [address] [address] [place] [id number]     Dear Colleague,    Thank you for referring your patient, To White, to the [facility]. Please see a copy of my visit note below.    [facility] Dermatology Note      Dermatology Problem List:  1.Plaque psoriasis: 1-3% TBSA  -Current Tx: Soriatane 25 mg daily, triamcinolone 0.1% ointment, desonide 0.05% ointment, calcipotriene 0.005% ointment  2. Onycholysis of distal right toenails, May be in association with psoriasis.    Encounter Date: Dec 28, 2017    CC:  Chief Complaint   Patient presents with     RECHECK     psorasis, its getting worse more itchy ans spreading, creams not helping as much anymore          History of Present Illness:  Mr. To White is a 47 year old male who was referred by Dr. Annel Yusuf presents for evaluation of psoriasis. He was last seen 6/20/17 when her was given a regimen of creams for his psoriasis during flares or maintenance.Today he notes that his psoriasis is flaring and not responding to the non steroid creams. The patient notes that he has no joint problems.     No other lesions of concern.    Past Medical History:   Patient Active Problem List   Diagnosis     Other psoriasis     Obesity     Essential hypertension with goal blood pressure less than 140/90     Past Medical History:   Diagnosis Date     Essential hypertension, benign 2000     Hypertension      Obesity      Other psoriasis     since childhood     Past Surgical History:   Procedure Laterality Date     CHOLECYSTECTOMY       LAPAROSCOPIC CHOLECYSTECTOMY  7/3/2012    Procedure: LAPAROSCOPIC CHOLECYSTECTOMY;  Laparoscopic Cholecystectomy;  Surgeon: Kulwinder Hollins MD;  Location:  OR     NO HISTORY OF SURGERY         Social History:  The patient works as an . The patient admits to use of tanning beds.    Family History:  There is  no family history of skin cancer.    Medications:  Current Outpatient Prescriptions   Medication Sig Dispense Refill     benazepril (LOTENSIN) 40 MG tablet TAKE 1 TABLET BY MOUTH  DAILY 15 tablet 0     amLODIPine (NORVASC) 10 MG tablet Take 1 tablet by mouth  daily 90 tablet 2     calcipotriene 0.005 % OINT Apply to the affected area twice a day Mon-Fri when rash is relatively calm. 240 g 3     triamcinolone (KENALOG) 0.1 % ointment Apply to affected areas of the trunk twice a day when flaring, M-F when improved, weekend only when relatively calm. 454 g 6     desonide (DESOWEN) 0.05 % ointment Apply to the affected area of the skin of groin or face daily for 2-3 week bursts as needed. 60 g 6     fluocinonide (LIDEX) 0.05 % cream Apply topically 2 times daily 120 g 1     calcipotriene (DOVONOX) 0.005 % cream Apply topically 2 times daily 120 g 1     hydrOXYzine (ATARAX) 25 MG tablet Take 1-2 tablets (25-50 mg) by mouth every 6 hours as needed for itching 60 tablet 1     Fexofenadine HCl (ALLEGRA PO) Reported on 5/19/2017       Pseudoephedrine-Ibuprofen (ADVIL COLD/SINUS PO) Reported on 5/19/2017         Allergies   Allergen Reactions     No Known Allergies        Review of Systems:  -Skin/Heme New Pt: The patient admits to prior blistering sunburns. The patient denies excessive scarring or problems healing except as per HPI. The patient denies excessive bleeding.  -Constitutional: The patient denies fatigue, fevers, chills, unintended weight loss, and night sweats.  -MSK: no joint pain.    Physical exam:  Vitals: There were no vitals taken for this visit.  GEN: This is a well developed, well-nourished male in no acute distress, in a pleasant mood.    NEURO: Alert and oriented  PSYCH: In pleasant mood, appropriate affect  SKIN: Waist-up skin, which includes the head/face, neck, both arms, chest, back, abdomen, digits and/or nails was examined.Including his right toes  - There are thin erythematous well demarcated  plaques with silvery micaceous scale on the back, chest, elbows, buttocks and flanks.  -Onycholysis of his distal right toenails   -No other lesions of concern on areas examined.       Impression/Plan:  1. Plaque psoriasis with post-inflammatory hyperpigmentation. Spreading  Reviewed side effects including but not limited to liver enzyme and cholesterol elevations as well as hematologic side effects, hair loss and drying of the skin/mucous membranes, sticky sensation of skin, nail fragility, DISH, pancreatitis and pyogenic granuloma. Medications list reviewed for tetracyclines.   Discussed association of psoriasis with weight, arthritis, cardiovascular disease, depression, alcohol and smoking. Recommend regular follow-up with PCP.   Start Soriatane 25mg daily. Baseline CBC with diff, bun/cralt/ast, fasting lipid panel first. Will recheck fasting lipids Q2 weeks until stable. Advised to use protection while sexually active and to stop any alcohol consumption while on soriatane.     Maintenance:    Continue, Calcipotriene BID M-F, for the Body,     Continue, Triamcinolone 0.1% ointment BID weekends,     Continue, Face/groin Desonide 0.05% ointment BID    Flares    Continue, Body triamcinolone 0.1% ointment BID x 2-3 weeks then taper to just M-F.     Continue, Calcipotriene BID on weekends once tapered.    Continue, Face/groin Desonide 0.05% ointment BID x 2-3 weeks then taper to just M-F.    Continue, Calcipotreiene BID on weekends once tapered     2. Onycholysis of distal right toenails, May be in association with psoriasis.   Will monitor for changes, may improve with soriatane .     Follow-up in 4 months, earlier for new or changing lesions.       Staff Involved:  Scribe/Staff    Scribe Disclosure:   I, Carolin Mckeon, am serving as a scribe to document services personally performed by Dr. Jacey Cervantes, based on data collection and the provider's statements to me.     Provider Disclosure:   The documentation  recorded by the scribe accurately reflects the services I personally performed and the decisions made by me.    Jacey Cervantes MD    Department of Dermatology  [facility]: Phone: [phone], Fax:[phone]  [facility]: Phone: [phone], Fax: [phone]

## 2018-01-15 NOTE — PROGRESS NOTES
"  SUBJECTIVE:                                                    To White is a 47 year old male who presents to clinic today for the following health issues:      History of Present Illness     Hypertension:     Outpatient blood pressures:  Are not being checked    Dietary sodium intake::  Not monitoring salt intake    Diet:  Regular (no restrictions)  Frequency of exercise:  1 day/week  Duration of exercise:  15-30 minutes  Taking medications regularly:  Yes  Medication side effects:  None  Additional concerns today:  No    Patient would also like to have labs done. Patient has standing orders from Dr. Cervantes for psoriasis.    Answers for HPI/ROS submitted by the patient on 1/16/2018   PHQ-2 Score: 0    Problem list and histories reviewed & adjusted, as indicated.  Additional history: as documented    Labs reviewed in EPIC    ROS:  Constitutional, HEENT, cardiovascular, pulmonary, gi and gu systems are negative, except as otherwise noted.      OBJECTIVE:   /76 (BP Location: Right arm, Patient Position: Sitting, Cuff Size: Adult Large)  Pulse 76  Temp 98.5  F (36.9  C) (Temporal)  Resp 18  Ht 6' 1.82\" (1.875 m)  Wt 259 lb (117.5 kg)  BMI 33.42 kg/m2  Body mass index is 33.42 kg/(m^2).  GENERAL: healthy, alert and no distress  RESP: lungs clear to auscultation - no rales, rhonchi or wheezes  CV: regular rate and rhythm, normal S1 S2, no S3 or S4, no murmur, click or rub, no peripheral edema   MS: no gross musculoskeletal defects noted, no edema  SKIN: no suspicious lesions or rashes  NEURO: Normal strength and tone, mentation intact and speech normal  PSYCH: mentation appears normal, affect normal/bright    Diagnostic Test Results:  CMP, CBC, Lipids, pending.    ASSESSMENT/PLAN:     1. Essential hypertension with goal blood pressure less than 140/90  Stable. Refills of medications sent.   - amLODIPine (NORVASC) 10 MG tablet; Take 1 tablet (10 mg) by mouth daily  Dispense: 90 tablet; Refill: 3  - " benazepril (LOTENSIN) 40 MG tablet; TAKE 1 TABLET BY MOUTH  DAILY  Dispense: 90 tablet; Refill: 3    2. Medication monitoring encounter  Orders released for Dr. Cervantes, labs for monitoring after starting new medication for psoriasis.  - Lipid Profile  - CBC with platelets differential  - Comprehensive metabolic panel  - CBC with platelets and differential; Standing  - Lipid Profile (Chol, Trig, HDL, LDL calc); Standing  - Comprehensive metabolic panel (BMP + Alb, Alk Phos, ALT, AST, Total. Bili, TP); Standing    LISBETH [facility]

## 2018-01-19 ENCOUNTER — OFFICE VISIT (OUTPATIENT)
Dept: FAMILY MEDICINE | Facility: OTHER | Age: 48
End: 2018-01-19
Payer: COMMERCIAL

## 2018-01-19 VITALS
WEIGHT: 259 LBS | RESPIRATION RATE: 18 BRPM | HEART RATE: 76 BPM | DIASTOLIC BLOOD PRESSURE: 76 MMHG | SYSTOLIC BLOOD PRESSURE: 116 MMHG | BODY MASS INDEX: 33.24 KG/M2 | TEMPERATURE: 98.5 F | HEIGHT: 74 IN

## 2018-01-19 DIAGNOSIS — I10 ESSENTIAL HYPERTENSION WITH GOAL BLOOD PRESSURE LESS THAN 140/90: Primary | ICD-10-CM

## 2018-01-19 DIAGNOSIS — Z51.81 MEDICATION MONITORING ENCOUNTER: ICD-10-CM

## 2018-01-19 LAB
ALBUMIN SERPL-MCNC: 3.9 G/DL (ref 3.4–5)
ALP SERPL-CCNC: 91 U/L (ref 40–150)
ALT SERPL W P-5'-P-CCNC: 22 U/L (ref 0–70)
ANION GAP SERPL CALCULATED.3IONS-SCNC: 7 MMOL/L (ref 3–14)
AST SERPL W P-5'-P-CCNC: 13 U/L (ref 0–45)
BASOPHILS # BLD AUTO: 0.1 10E9/L (ref 0–0.2)
BASOPHILS NFR BLD AUTO: 0.7 %
BILIRUB SERPL-MCNC: 0.3 MG/DL (ref 0.2–1.3)
BUN SERPL-MCNC: 11 MG/DL (ref 7–30)
CALCIUM SERPL-MCNC: 8.8 MG/DL (ref 8.5–10.1)
CHLORIDE SERPL-SCNC: 104 MMOL/L (ref 94–109)
CHOLEST SERPL-MCNC: 168 MG/DL
CO2 SERPL-SCNC: 27 MMOL/L (ref 20–32)
CREAT SERPL-MCNC: 1.03 MG/DL (ref 0.66–1.25)
DIFFERENTIAL METHOD BLD: NORMAL
EOSINOPHIL # BLD AUTO: 0.5 10E9/L (ref 0–0.7)
EOSINOPHIL NFR BLD AUTO: 6 %
ERYTHROCYTE [DISTWIDTH] IN BLOOD BY AUTOMATED COUNT: 13.4 % (ref 10–15)
GFR SERPL CREATININE-BSD FRML MDRD: 77 ML/MIN/1.7M2
GLUCOSE SERPL-MCNC: 88 MG/DL (ref 70–99)
HCT VFR BLD AUTO: 41.8 % (ref 40–53)
HDLC SERPL-MCNC: 53 MG/DL
HGB BLD-MCNC: 14.2 G/DL (ref 13.3–17.7)
LDLC SERPL CALC-MCNC: 100 MG/DL
LYMPHOCYTES # BLD AUTO: 2.9 10E9/L (ref 0.8–5.3)
LYMPHOCYTES NFR BLD AUTO: 33.9 %
MCH RBC QN AUTO: 30.8 PG (ref 26.5–33)
MCHC RBC AUTO-ENTMCNC: 34 G/DL (ref 31.5–36.5)
MCV RBC AUTO: 91 FL (ref 78–100)
MONOCYTES # BLD AUTO: 0.7 10E9/L (ref 0–1.3)
MONOCYTES NFR BLD AUTO: 8.3 %
NEUTROPHILS # BLD AUTO: 4.4 10E9/L (ref 1.6–8.3)
NEUTROPHILS NFR BLD AUTO: 51.1 %
NONHDLC SERPL-MCNC: 115 MG/DL
PLATELET # BLD AUTO: 364 10E9/L (ref 150–450)
POTASSIUM SERPL-SCNC: 4.6 MMOL/L (ref 3.4–5.3)
PROT SERPL-MCNC: 7.7 G/DL (ref 6.8–8.8)
RBC # BLD AUTO: 4.61 10E12/L (ref 4.4–5.9)
SODIUM SERPL-SCNC: 138 MMOL/L (ref 133–144)
TRIGL SERPL-MCNC: 75 MG/DL
WBC # BLD AUTO: 8.7 10E9/L (ref 4–11)

## 2018-01-19 PROCEDURE — 36415 COLL VENOUS BLD VENIPUNCTURE: CPT | Performed by: DERMATOLOGY

## 2018-01-19 PROCEDURE — 85025 COMPLETE CBC W/AUTO DIFF WBC: CPT | Performed by: DERMATOLOGY

## 2018-01-19 PROCEDURE — 80053 COMPREHEN METABOLIC PANEL: CPT | Performed by: DERMATOLOGY

## 2018-01-19 PROCEDURE — 80061 LIPID PANEL: CPT | Performed by: DERMATOLOGY

## 2018-01-19 PROCEDURE — 99213 OFFICE O/P EST LOW 20 MIN: CPT | Performed by: STUDENT IN AN ORGANIZED HEALTH CARE EDUCATION/TRAINING PROGRAM

## 2018-01-19 RX ORDER — AMLODIPINE BESYLATE 10 MG/1
10 TABLET ORAL DAILY
Qty: 90 TABLET | Refills: 3 | Status: SHIPPED | OUTPATIENT
Start: 2018-06-01 | End: 2018-12-26

## 2018-01-19 RX ORDER — BENAZEPRIL HYDROCHLORIDE 40 MG/1
TABLET ORAL
Qty: 90 TABLET | Refills: 3 | Status: SHIPPED | OUTPATIENT
Start: 2018-01-19 | End: 2018-12-07

## 2018-01-19 RX ORDER — BENAZEPRIL HYDROCHLORIDE 40 MG/1
TABLET ORAL
Qty: 30 TABLET | Refills: 0 | Status: SHIPPED | OUTPATIENT
Start: 2018-01-19 | End: 2018-01-19

## 2018-01-19 NOTE — MR AVS SNAPSHOT
After Visit Summary   1/19/2018    To White    MRN: [id number]           Patient Information     Date Of Birth          1970        Visit Information        Provider Department      1/19/2018 7:20 AM Maggie Pepper APRN CNP [facility]        Today's Diagnoses     Essential hypertension with goal blood pressure less than 140/90    -  1    Medication monitoring encounter           Follow-ups after your visit        Your next 10 appointments already scheduled     Mar 29, 2018  4:30 PM CDT   Return Visit with Jacey Cervantes MD   [facility] ([facility]    [address] [place] [id number] [phone]              Future tests that were ordered for you today     Open Standing Orders        Priority Remaining Interval Expires Ordered    CBC with platelets and differential Routine 3/3  1/19/2019 1/19/2018    Lipid Profile (Chol, Trig, HDL, LDL calc) Routine 3/3  1/19/2019 1/19/2018    Comprehensive metabolic panel (BMP + Alb, Alk Phos, ALT, AST, Total. Bili, TP) Routine 3/3  1/19/2019 1/19/2018            Who to contact     If you have questions or need follow up information about today's clinic visit or your schedule please contact [facility] directly at [phone].  Normal or non-critical lab and imaging results will be communicated to you by MyChart, letter or phone within 4 business days after the clinic has received the results. If you do not hear from us within 7 days, please contact the clinic through MyChart or phone. If you have a critical or abnormal lab result, we will notify you by phone as soon as possible.  Submit refill requests through [employer] or call your pharmacy and they will forward the refill request to us. Please allow 3 business days for your refill to be completed.          Additional Information About Your Visit        AppFoghart Information     [employer] gives you secure access to  "your electronic health record. If you see a primary care provider, you can also send messages to your care team and make appointments. If you have questions, please call your primary care clinic.  If you do not have a primary care provider, please call [phone] and they will assist you.        Care EveryWhere ID     This is your Care EveryWhere ID. This could be used by other organizations to access your [place] medical records  [id number]        Your Vitals Were     Pulse Temperature Respirations Height BMI (Body Mass Index)       76 98.5  F (36.9  C) (Temporal) 18 6' 1.82\" (1.875 m) 33.42 kg/m2        Blood Pressure from Last 3 Encounters:   01/19/18 116/76   05/19/17 126/74   05/10/17 113/80    Weight from Last 3 Encounters:   01/19/18 259 lb (117.5 kg)   05/19/17 261 lb (118.4 kg)   04/13/17 256 lb (116.1 kg)              We Performed the Following     CBC with platelets differential     Comprehensive metabolic panel     Lipid Profile          Today's Medication Changes          These changes are accurate as of: 1/19/18  9:21 AM.  If you have any questions, ask your nurse or doctor.               Start taking these medicines.        Dose/Directions    benazepril 40 MG tablet   Commonly known as:  LOTENSIN   Used for:  Essential hypertension with goal blood pressure less than 140/90   Started by:  Maggie Pepper APRN CNP        TAKE 1 TABLET BY MOUTH  DAILY   Quantity:  90 tablet   Refills:  3         These medicines have changed or have updated prescriptions.        Dose/Directions    amLODIPine 10 MG tablet   Commonly known as:  NORVASC   This may have changed:  See the new instructions.   Used for:  Essential hypertension with goal blood pressure less than 140/90   Changed by:  Maggie Pepper APRN CNP        Dose:  10 mg   Start taking on:  6/1/2018   Take 1 tablet (10 mg) by mouth daily   Quantity:  90 tablet   Refills:  3            Where to get your medicines      These " medications were sent to [facility] - [address], [facility] [address]     Phone:  [phone]     amLODIPine 10 MG tablet    benazepril 40 MG tablet                Primary Care Provider Fax #    Physician No Ref-Primary [phone]       No address on file        Equal Access to Services     [facility]: Hadii aad ku hadasho Soomaali, waaxda luqadaha, qaybta kaalmada adeegyada, waxay idiin hayaan adeeg khfarnazsh layurijohn . So [facility] [phone].    ATENCIÓN: Si habla español, tiene a watkins disposición servicios gratuitos de asistencia lingüística. Llame al [phone].    We comply with applicable federal civil rights laws and [place] laws. We do not discriminate on the basis of race, color, national origin, age, disability, sex, sexual orientation, or gender identity.            Thank you!     Thank you for choosing [facility]  for your care. Our goal is always to provide you with excellent care. Hearing back from our patients is one way we can continue to improve our services. Please take a few minutes to complete the written survey that you may receive in the mail after your visit with us. Thank you!             Your Updated Medication List - Protect others around you: Learn how to safely use, store and throw away your medicines at www.disposemymeds.org.          This list is accurate as of: 1/19/18  9:21 AM.  Always use your most recent med list.                   Brand Name Dispense Instructions for use Diagnosis    acitretin 25 MG Caps capsule    SORIATANE    90 capsule    Take 1 capsule (25 mg) by mouth daily (with dinner)    Psoriasis       ADVIL COLD/SINUS PO      Reported on 5/19/2017        ALLEGRA PO      Reported on 5/19/2017        amLODIPine 10 MG tablet   Start taking on:  6/1/2018    NORVASC    90 tablet    Take 1 tablet (10 mg) by mouth daily    Essential hypertension with goal blood pressure less than 140/90        benazepril 40 MG tablet    LOTENSIN    90 tablet    TAKE 1 TABLET BY MOUTH  DAILY    Essential hypertension with goal blood pressure less than 140/90       * calcipotriene 0.005 % cream    DOVONOX    120 g    Apply topically 2 times daily    Other psoriasis       * calcipotriene 0.005 % Oint     240 g    Apply to the affected area twice a day Mon-Fri when rash is relatively calm.    Plaque psoriasis       desonide 0.05 % ointment    DESOWEN    60 g    Apply to the affected area of the skin of groin or face daily for 2-3 week bursts as needed.    Plaque psoriasis       fluocinonide 0.05 % cream    LIDEX    120 g    Apply topically 2 times daily    Other psoriasis       hydrOXYzine 25 MG tablet    ATARAX    60 tablet    Take 1-2 tablets (25-50 mg) by mouth every 6 hours as needed for itching    Itching       triamcinolone 0.1 % ointment    KENALOG    454 g    Apply to affected areas of the trunk twice a day when flaring, M-F when improved, weekend only when relatively calm.    Plaque psoriasis       * Notice:  This list has 2 medication(s) that are the same as other medications prescribed for you. Read the directions carefully, and ask your doctor or other care provider to review them with you.

## 2018-01-29 DIAGNOSIS — L40.9 PSORIASIS: ICD-10-CM

## 2018-01-29 RX ORDER — ACITRETIN 25 MG/1
25 CAPSULE ORAL
Qty: 90 CAPSULE | Refills: 0 | Status: SHIPPED | OUTPATIENT
Start: 2018-01-29 | End: 2018-05-03 | Stop reason: DRUGHIGH

## 2018-04-27 ENCOUNTER — DOCUMENTATION ONLY (OUTPATIENT)
Dept: LAB | Facility: CLINIC | Age: 48
End: 2018-04-27

## 2018-04-27 DIAGNOSIS — L40.8 OTHER PSORIASIS: Primary | ICD-10-CM

## 2018-05-01 ENCOUNTER — OFFICE VISIT (OUTPATIENT)
Dept: DERMATOLOGY | Facility: CLINIC | Age: 48
End: 2018-05-01
Payer: COMMERCIAL

## 2018-05-01 DIAGNOSIS — L40.9 PSORIASIS: ICD-10-CM

## 2018-05-01 DIAGNOSIS — L40.8 OTHER PSORIASIS: ICD-10-CM

## 2018-05-01 DIAGNOSIS — Z51.81 MEDICATION MONITORING ENCOUNTER: Primary | ICD-10-CM

## 2018-05-01 LAB
ALBUMIN SERPL-MCNC: 4.3 G/DL (ref 3.4–5)
ALP SERPL-CCNC: 84 U/L (ref 40–150)
ALT SERPL W P-5'-P-CCNC: 24 U/L (ref 0–70)
ANION GAP SERPL CALCULATED.3IONS-SCNC: 7 MMOL/L (ref 3–14)
AST SERPL W P-5'-P-CCNC: 23 U/L (ref 0–45)
BASOPHILS # BLD AUTO: 0.1 10E9/L (ref 0–0.2)
BASOPHILS NFR BLD AUTO: 0.9 %
BILIRUB SERPL-MCNC: 0.5 MG/DL (ref 0.2–1.3)
BUN SERPL-MCNC: 9 MG/DL (ref 7–30)
CALCIUM SERPL-MCNC: 9.2 MG/DL (ref 8.5–10.1)
CHLORIDE SERPL-SCNC: 106 MMOL/L (ref 94–109)
CO2 SERPL-SCNC: 25 MMOL/L (ref 20–32)
CREAT SERPL-MCNC: 0.97 MG/DL (ref 0.66–1.25)
DIFFERENTIAL METHOD BLD: ABNORMAL
EOSINOPHIL # BLD AUTO: 0.5 10E9/L (ref 0–0.7)
EOSINOPHIL NFR BLD AUTO: 4.4 %
ERYTHROCYTE [DISTWIDTH] IN BLOOD BY AUTOMATED COUNT: 12.8 % (ref 10–15)
GFR SERPL CREATININE-BSD FRML MDRD: 83 ML/MIN/1.7M2
GLUCOSE SERPL-MCNC: 88 MG/DL (ref 70–99)
HCT VFR BLD AUTO: 43.7 % (ref 40–53)
HGB BLD-MCNC: 14.7 G/DL (ref 13.3–17.7)
IMM GRANULOCYTES # BLD: 0.1 10E9/L (ref 0–0.4)
IMM GRANULOCYTES NFR BLD: 0.4 %
LYMPHOCYTES # BLD AUTO: 3.5 10E9/L (ref 0.8–5.3)
LYMPHOCYTES NFR BLD AUTO: 30.6 %
MCH RBC QN AUTO: 30.4 PG (ref 26.5–33)
MCHC RBC AUTO-ENTMCNC: 33.6 G/DL (ref 31.5–36.5)
MCV RBC AUTO: 91 FL (ref 78–100)
MONOCYTES # BLD AUTO: 0.8 10E9/L (ref 0–1.3)
MONOCYTES NFR BLD AUTO: 6.9 %
NEUTROPHILS # BLD AUTO: 6.5 10E9/L (ref 1.6–8.3)
NEUTROPHILS NFR BLD AUTO: 56.8 %
PLATELET # BLD AUTO: 382 10E9/L (ref 150–450)
POTASSIUM SERPL-SCNC: 4 MMOL/L (ref 3.4–5.3)
PROT SERPL-MCNC: 8.4 G/DL (ref 6.8–8.8)
RBC # BLD AUTO: 4.83 10E12/L (ref 4.4–5.9)
SODIUM SERPL-SCNC: 138 MMOL/L (ref 133–144)
TRIGL SERPL-MCNC: 180 MG/DL
WBC # BLD AUTO: 11.5 10E9/L (ref 4–11)

## 2018-05-01 PROCEDURE — 85025 COMPLETE CBC W/AUTO DIFF WBC: CPT | Performed by: DERMATOLOGY

## 2018-05-01 PROCEDURE — 84478 ASSAY OF TRIGLYCERIDES: CPT | Performed by: DERMATOLOGY

## 2018-05-01 PROCEDURE — 36415 COLL VENOUS BLD VENIPUNCTURE: CPT | Performed by: DERMATOLOGY

## 2018-05-01 PROCEDURE — 99213 OFFICE O/P EST LOW 20 MIN: CPT | Performed by: DERMATOLOGY

## 2018-05-01 PROCEDURE — 80053 COMPREHEN METABOLIC PANEL: CPT | Performed by: DERMATOLOGY

## 2018-05-01 RX ORDER — CLOBETASOL PROPIONATE 0.5 MG/ML
SOLUTION TOPICAL
Qty: 50 ML | Refills: 3 | Status: SHIPPED | OUTPATIENT
Start: 2018-05-01 | End: 2020-09-10

## 2018-05-01 NOTE — PROGRESS NOTES
[facility] Dermatology Note      Dermatology Problem List:  1.Plaque psoriasis: 1-3% TBSA  -Current Tx: Soriatane 25 mg daily, triamcinolone 0.1% ointment, desonide 0.05% ointment, calcipotriene 0.005% ointment  2. Onycholysis of distal right toenails, May be in association with psoriasis.    Encounter Date: May 1, 2018    CC:  Chief Complaint   Patient presents with     Derm Problem     psorasis- scalp is ember itchy still          History of Present Illness:  Mr. To White is a 48 year old male who presents for evaluation of psoriasis. He was last seen 12/28/2017 when her was given a regimen of creams for his psoriasis during flares or maintenance.Today he notes that his psoriasis on the scalp is doing worse, and his buttocks has some spots coming. The patient feels that the rest of the body is maintained. He is using soriatane. He is still pruritic. He uses the T gel shampoo.     He has had some finger pain on the edges of the fingers that started after starting the acitretin.     No other lesions of concern.    Past Medical History:   Patient Active Problem List   Diagnosis     Other psoriasis     Obesity     Essential hypertension with goal blood pressure less than 140/90     Past Medical History:   Diagnosis Date     Essential hypertension, benign 2000     Hypertension      Obesity      Other psoriasis     since childhood     Past Surgical History:   Procedure Laterality Date     CHOLECYSTECTOMY       LAPAROSCOPIC CHOLECYSTECTOMY  7/3/2012    Procedure: LAPAROSCOPIC CHOLECYSTECTOMY;  Laparoscopic Cholecystectomy;  Surgeon: Kulwinder Hollins MD;  Location:  OR     NO HISTORY OF SURGERY         Social History:  The patient works as an .   Kept in chart for convenience.       Family History:  There is no family history of skin cancer.  Kept in chart for convenience.       Medications:  Current Outpatient Prescriptions   Medication Sig Dispense Refill     acitretin (SORIATANE) 25  MG CAPS capsule Take 1 capsule (25 mg) by mouth daily (with dinner) 90 capsule 0     [START ON 6/1/2018] amLODIPine (NORVASC) 10 MG tablet Take 1 tablet (10 mg) by mouth daily 90 tablet 3     benazepril (LOTENSIN) 40 MG tablet TAKE 1 TABLET BY MOUTH  DAILY 90 tablet 3     calcipotriene (DOVONOX) 0.005 % cream Apply topically 2 times daily 120 g 1     calcipotriene 0.005 % OINT Apply to the affected area twice a day Mon-Fri when rash is relatively calm. 240 g 3     desonide (DESOWEN) 0.05 % ointment Apply to the affected area of the skin of groin or face daily for 2-3 week bursts as needed. 60 g 6     Fexofenadine HCl (ALLEGRA PO) Reported on 5/19/2017       fluocinonide (LIDEX) 0.05 % cream Apply topically 2 times daily 120 g 1     hydrOXYzine (ATARAX) 25 MG tablet Take 1-2 tablets (25-50 mg) by mouth every 6 hours as needed for itching 60 tablet 1     Pseudoephedrine-Ibuprofen (ADVIL COLD/SINUS PO) Reported on 5/19/2017       triamcinolone (KENALOG) 0.1 % ointment Apply to affected areas of the trunk twice a day when flaring, M-F when improved, weekend only when relatively calm. 454 g 6       Allergies   Allergen Reactions     No Known Allergies        Review of Systems:  -Skin: As per HPI.   -Constitutional: The patient is doing generally well.   -MSK: no joint pain.    Physical exam:  Vitals: There were no vitals taken for this visit.  GEN: This is a well developed, well-nourished male in no acute distress, in a pleasant mood.    SKIN: Waist-up skin, which includes the head/face, neck, both arms, chest, back, abdomen, digits and/or nails was examined.Including his right toes  -normal nails  -Papules scattered on the trunk that are erythematous with micaceous scale, about 1 dozen, primarily on back and few in scalp  -nails are normal.   -hyperpigmented patches on the back  -No other lesions of concern on areas examined.       Impression/Plan:  1. Plaque psoriasis with post-inflammatory hyperpigmentation. Improved  on soriatane 25mg daily but recommend increase.   Increase Soriatane to 50mg daily. Will recheck fasting lipids Q2 weeks until stable.     Maintenance:    Continue, Calcipotriene BID M-F, for the Body,     Continue, Triamcinolone 0.1% ointment BID weekends,     Continue, Face/groin Desonide 0.05% ointment BID    Flares    Continue, Body triamcinolone 0.1% ointment BID x 2-3 weeks then taper to just M-F.     Continue, Calcipotriene BID on weekends once tapered.    Continue, Face/groin Desonide 0.05% ointment BID x 2-3 weeks then taper to just M-F.    Continue, Calcipotreiene BID on weekends once tapered     Scalp start clobetasol BId for up to 2 weeks  2. Normal nails, no evidence of pyogenic granulomas today  Will monitor for changes   CC Dr. Annel Yusuf at the close of this encounter.   Follow-up in 4 monts, earlier for new or changing lesions.       Staff Involved:  Scribe/Staff    Scribe Disclosure:   I, Carolin Mckeon, am serving as a scribe to document services personally performed by Dr. Jacey Cervantes, based on data collection and the provider's statements to me.       Provider Disclosure:   The documentation recorded by the scribe accurately reflects the services I personally performed and the decisions made by me.    Jacey Cervantes MD    Department of Dermatology  [facility]: Phone: [phone], Fax:[phone]  [facility]: Phone: [phone], Fax: [phone]

## 2018-05-01 NOTE — MR AVS SNAPSHOT
After Visit Summary   5/1/2018    To White    MRN: [id number]           Patient Information     Date Of Birth          1970        Visit Information        Provider Department      5/1/2018 4:15 PM Jacey Cervantes MD [facility]        Today's Diagnoses     Medication monitoring encounter    -  1    Psoriasis           Follow-ups after your visit        Follow-up notes from your care team     Return in about 4 months (around 9/1/2018).      Who to contact     If you have questions or need follow up information about today's clinic visit or your schedule please contact [facility] directly at [phone].  Normal or non-critical lab and imaging results will be communicated to you by Tastemadehart, letter or phone within 4 business days after the clinic has received the results. If you do not hear from us within 7 days, please contact the clinic through Tastemadehart or phone. If you have a critical or abnormal lab result, we will notify you by phone as soon as possible.  Submit refill requests through [employer] or call your pharmacy and they will forward the refill request to us. Please allow 3 business days for your refill to be completed.          Additional Information About Your Visit        MyChart Information     [employer] gives you secure access to your electronic health record. If you see a primary care provider, you can also send messages to your care team and make appointments. If you have questions, please call your primary care clinic.  If you do not have a primary care provider, please call [phone] and they will assist you.      H[employer]ics is an electronic gateway that provides easy, online access to your medical records. With [employer], you can request a clinic appointment, read your test results, renew a prescription or communicate with your care team.     To access your existing account, please contact your [facility] or call  [phone] for assistance.        Care EveryWhere ID     This is your Care EveryWhere ID. This could be used by other organizations to access your [place] medical records  [id number]         Blood Pressure from Last 3 Encounters:   01/19/18 116/76   05/19/17 126/74   05/10/17 113/80    Weight from Last 3 Encounters:   01/19/18 117.5 kg (259 lb)   05/19/17 118.4 kg (261 lb)   04/13/17 116.1 kg (256 lb)              We Performed the Following     Triglycerides          Today's Medication Changes          These changes are accurate as of 5/1/18  4:30 PM.  If you have any questions, ask your nurse or doctor.               Start taking these medicines.        Dose/Directions    clobetasol 0.05 % external solution   Commonly known as:  TEMOVATE   Used for:  Psoriasis   Started by:  Jacey Cervantes MD        Apply on scalp for up to 2 weeks in a row for flares   Quantity:  50 mL   Refills:  3            Where to get your medicines      These medications were sent to [facility] - [address], [facility] [address]     Phone:  [phone]     clobetasol 0.05 % external solution                Primary Care Provider Fax #    Physician No Ref-Primary [phone]       No address on file        Equal Access to Services     [facility]: Nhi marteo Sobrandon, waaxda luqadaha, qaybta kaalmada ademorena, beni alonzo. So [facility] [phone].    ATENCIÓN: Si habla español, tiene a watkins disposición servicios gratuitos de asistencia lingüística. Llame al [phone].    We comply with applicable federal civil rights laws and [place] laws. We do not discriminate on the basis of race, color, national origin, age, disability, sex, sexual orientation, or gender identity.            Thank you!     Thank you for choosing [facility]  for your care. Our goal is always to provide you with excellent care. Hearing back  from our patients is one way we can continue to improve our services. Please take a few minutes to complete the written survey that you may receive in the mail after your visit with us. Thank you!             Your Updated Medication List - Protect others around you: Learn how to safely use, store and throw away your medicines at www.disposemymeds.org.          This list is accurate as of 5/1/18  4:30 PM.  Always use your most recent med list.                   Brand Name Dispense Instructions for use Diagnosis    acitretin 25 MG Caps capsule    SORIATANE    90 capsule    Take 1 capsule (25 mg) by mouth daily (with dinner)    Psoriasis       ADVIL COLD/SINUS PO      Reported on 5/19/2017        ALLEGRA PO      Reported on 5/19/2017        amLODIPine 10 MG tablet   Start taking on:  6/1/2018    NORVASC    90 tablet    Take 1 tablet (10 mg) by mouth daily    Essential hypertension with goal blood pressure less than 140/90       benazepril 40 MG tablet    LOTENSIN    90 tablet    TAKE 1 TABLET BY MOUTH  DAILY    Essential hypertension with goal blood pressure less than 140/90       * calcipotriene 0.005 % cream    DOVONOX    120 g    Apply topically 2 times daily    Other psoriasis       * calcipotriene 0.005 % Oint     240 g    Apply to the affected area twice a day Mon-Fri when rash is relatively calm.    Plaque psoriasis       clobetasol 0.05 % external solution    TEMOVATE    50 mL    Apply on scalp for up to 2 weeks in a row for flares    Psoriasis       desonide 0.05 % ointment    DESOWEN    60 g    Apply to the affected area of the skin of groin or face daily for 2-3 week bursts as needed.    Plaque psoriasis       fluocinonide 0.05 % cream    LIDEX    120 g    Apply topically 2 times daily    Other psoriasis       hydrOXYzine 25 MG tablet    ATARAX    60 tablet    Take 1-2 tablets (25-50 mg) by mouth every 6 hours as needed for itching    Itching       triamcinolone 0.1 % ointment    KENALOG    454 g    Apply to  affected areas of the trunk twice a day when flaring, M-F when improved, weekend only when relatively calm.    Plaque psoriasis       * Notice:  This list has 2 medication(s) that are the same as other medications prescribed for you. Read the directions carefully, and ask your doctor or other care provider to review them with you.

## 2018-05-01 NOTE — NURSING NOTE
"To White's goals for this visit include:   Chief Complaint   Patient presents with     Derm Problem     psorasis- scalp is ember itchy still        He requests these members of his care team be copied on today's visit information: yes    PCP: No Ref-Primary, Physician    Referring Provider:  No referring provider defined for this encounter.    Chief Complaint   Patient presents with     Derm Problem     psorasis- scalp is ember itchy still        Initial There were no vitals taken for this visit. Estimated body mass index is 33.42 kg/(m^2) as calculated from the following:    Height as of 1/19/18: 1.875 m (6' 1.82\").    Weight as of 1/19/18: 117.5 kg (259 lb).  Medication Reconciliation: complete    Do you need any medication refills at today's visit? No     Amorrletha Parra CMA        "

## 2018-05-01 NOTE — LETTER
5/1/2018         RE: To White  [address] [address] [place] [id number]        Dear Colleague,    Thank you for referring your patient, To White, to the [facility]. Please see a copy of my visit note below.    [facility] Dermatology Note      Dermatology Problem List:  1.Plaque psoriasis: 1-3% TBSA  -Current Tx: Soriatane 25 mg daily, triamcinolone 0.1% ointment, desonide 0.05% ointment, calcipotriene 0.005% ointment  2. Onycholysis of distal right toenails, May be in association with psoriasis.    Encounter Date: May 1, 2018    CC:  Chief Complaint   Patient presents with     Derm Problem     psorasis- scalp is ember itchy still          History of Present Illness:  Mr. To White is a 48 year old male who presents for evaluation of psoriasis. He was last seen 12/28/2017 when her was given a regimen of creams for his psoriasis during flares or maintenance.Today he notes that his psoriasis on the scalp is doing worse, and his buttocks has some spots coming. The patient feels that the rest of the body is maintained. He is using soriatane. He is still pruritic. He uses the T gel shampoo.     He has had some finger pain on the edges of the fingers that started after starting the acitretin.     No other lesions of concern.    Past Medical History:   Patient Active Problem List   Diagnosis     Other psoriasis     Obesity     Essential hypertension with goal blood pressure less than 140/90     Past Medical History:   Diagnosis Date     Essential hypertension, benign 2000     Hypertension      Obesity      Other psoriasis     since childhood     Past Surgical History:   Procedure Laterality Date     CHOLECYSTECTOMY       LAPAROSCOPIC CHOLECYSTECTOMY  7/3/2012    Procedure: LAPAROSCOPIC CHOLECYSTECTOMY;  Laparoscopic Cholecystectomy;  Surgeon: Kulwinder Hollins MD;  Location:  OR     NO HISTORY OF SURGERY         Social History:  The patient works as an .    Kept in chart for convenience.       Family History:  There is no family history of skin cancer.  Kept in chart for convenience.       Medications:  Current Outpatient Prescriptions   Medication Sig Dispense Refill     acitretin (SORIATANE) 25 MG CAPS capsule Take 1 capsule (25 mg) by mouth daily (with dinner) 90 capsule 0     [START ON 6/1/2018] amLODIPine (NORVASC) 10 MG tablet Take 1 tablet (10 mg) by mouth daily 90 tablet 3     benazepril (LOTENSIN) 40 MG tablet TAKE 1 TABLET BY MOUTH  DAILY 90 tablet 3     calcipotriene (DOVONOX) 0.005 % cream Apply topically 2 times daily 120 g 1     calcipotriene 0.005 % OINT Apply to the affected area twice a day Mon-Fri when rash is relatively calm. 240 g 3     desonide (DESOWEN) 0.05 % ointment Apply to the affected area of the skin of groin or face daily for 2-3 week bursts as needed. 60 g 6     Fexofenadine HCl (ALLEGRA PO) Reported on 5/19/2017       fluocinonide (LIDEX) 0.05 % cream Apply topically 2 times daily 120 g 1     hydrOXYzine (ATARAX) 25 MG tablet Take 1-2 tablets (25-50 mg) by mouth every 6 hours as needed for itching 60 tablet 1     Pseudoephedrine-Ibuprofen (ADVIL COLD/SINUS PO) Reported on 5/19/2017       triamcinolone (KENALOG) 0.1 % ointment Apply to affected areas of the trunk twice a day when flaring, M-F when improved, weekend only when relatively calm. 454 g 6       Allergies   Allergen Reactions     No Known Allergies        Review of Systems:  -Skin: As per HPI.   -Constitutional: The patient is doing generally well.   -MSK: no joint pain.    Physical exam:  Vitals: There were no vitals taken for this visit.  GEN: This is a well developed, well-nourished male in no acute distress, in a pleasant mood.    SKIN: Waist-up skin, which includes the head/face, neck, both arms, chest, back, abdomen, digits and/or nails was examined.Including his right toes  -normal nails  -Papules scattered on the trunk that are erythematous with micaceous scale, about  1 dozen, primarily on back and few in scalp  -nails are normal.   -hyperpigmented patches on the back  -No other lesions of concern on areas examined.       Impression/Plan:  1. Plaque psoriasis with post-inflammatory hyperpigmentation. Improved on soriatane 25mg daily but recommend increase.   Increase Soriatane to 50mg daily. Will recheck fasting lipids Q2 weeks until stable.     Maintenance:    Continue, Calcipotriene BID M-F, for the Body,     Continue, Triamcinolone 0.1% ointment BID weekends,     Continue, Face/groin Desonide 0.05% ointment BID    Flares    Continue, Body triamcinolone 0.1% ointment BID x 2-3 weeks then taper to just M-F.     Continue, Calcipotriene BID on weekends once tapered.    Continue, Face/groin Desonide 0.05% ointment BID x 2-3 weeks then taper to just M-F.    Continue, Calcipotreiene BID on weekends once tapered     Scalp start clobetasol BId for up to 2 weeks  2. Normal nails, no evidence of pyogenic granulomas today  Will monitor for changes   CC Dr. Annel Yusuf at the close of this encounter.   Follow-up in 4 monts, earlier for new or changing lesions.       Staff Involved:  Scribe/Staff    Scribe Disclosure:   I, Carolin Mckeon, am serving as a scribe to document services personally performed by Dr. Jacey Cervantes, based on data collection and the provider's statements to me.       Provider Disclosure:   The documentation recorded by the scribe accurately reflects the services I personally performed and the decisions made by me.    Jacey Cervantes MD    Department of Dermatology  [facility]: Phone: [phone], Fax:[phone]  [facility]: Phone: [phone], Fax: [phone]        Again, thank you for allowing me to participate in the care of your patient.        Sincerely,        Jacey Cervantes MD

## 2018-05-03 DIAGNOSIS — L40.9 PSORIASIS: Primary | ICD-10-CM

## 2018-05-03 RX ORDER — ACITRETIN 25 MG/1
50 CAPSULE ORAL DAILY
Qty: 60 CAPSULE | Refills: 4 | Status: SHIPPED | OUTPATIENT
Start: 2018-05-03 | End: 2018-10-30

## 2018-05-15 DIAGNOSIS — L40.9 PSORIASIS: ICD-10-CM

## 2018-05-15 LAB
ALT SERPL W P-5'-P-CCNC: 26 U/L (ref 0–70)
AST SERPL W P-5'-P-CCNC: 22 U/L (ref 0–45)
BASOPHILS # BLD AUTO: 0.1 10E9/L (ref 0–0.2)
BASOPHILS NFR BLD AUTO: 0.7 %
DIFFERENTIAL METHOD BLD: NORMAL
EOSINOPHIL # BLD AUTO: 0.5 10E9/L (ref 0–0.7)
EOSINOPHIL NFR BLD AUTO: 5.3 %
ERYTHROCYTE [DISTWIDTH] IN BLOOD BY AUTOMATED COUNT: 13.4 % (ref 10–15)
HCT VFR BLD AUTO: 43.2 % (ref 40–53)
HGB BLD-MCNC: 14.6 G/DL (ref 13.3–17.7)
LYMPHOCYTES # BLD AUTO: 3.4 10E9/L (ref 0.8–5.3)
LYMPHOCYTES NFR BLD AUTO: 36.1 %
MCH RBC QN AUTO: 30.9 PG (ref 26.5–33)
MCHC RBC AUTO-ENTMCNC: 33.8 G/DL (ref 31.5–36.5)
MCV RBC AUTO: 91 FL (ref 78–100)
MONOCYTES # BLD AUTO: 1 10E9/L (ref 0–1.3)
MONOCYTES NFR BLD AUTO: 10.3 %
NEUTROPHILS # BLD AUTO: 4.5 10E9/L (ref 1.6–8.3)
NEUTROPHILS NFR BLD AUTO: 47.6 %
PLATELET # BLD AUTO: 348 10E9/L (ref 150–450)
RBC # BLD AUTO: 4.73 10E12/L (ref 4.4–5.9)
TRIGL SERPL-MCNC: 249 MG/DL
WBC # BLD AUTO: 9.4 10E9/L (ref 4–11)

## 2018-05-15 PROCEDURE — 84460 ALANINE AMINO (ALT) (SGPT): CPT | Performed by: DERMATOLOGY

## 2018-05-15 PROCEDURE — 85025 COMPLETE CBC W/AUTO DIFF WBC: CPT | Performed by: DERMATOLOGY

## 2018-05-15 PROCEDURE — 36415 COLL VENOUS BLD VENIPUNCTURE: CPT | Performed by: DERMATOLOGY

## 2018-05-15 PROCEDURE — 84478 ASSAY OF TRIGLYCERIDES: CPT | Performed by: DERMATOLOGY

## 2018-05-15 PROCEDURE — 84450 TRANSFERASE (AST) (SGOT): CPT | Performed by: DERMATOLOGY

## 2018-05-23 ENCOUNTER — TELEPHONE (OUTPATIENT)
Dept: DERMATOLOGY | Facility: CLINIC | Age: 48
End: 2018-05-23

## 2018-05-23 NOTE — TELEPHONE ENCOUNTER
Notes Recorded by Shakila Orozco CMA on 5/23/2018 at 8:55 AM  He will go to [facility] to have his labs done.    Shakila Orozco CMA    ------    Notes Recorded by Shakila Orozco CMA on 5/23/2018 at 8:54 AM  Called patient and informed the patient of his results and Dr. Cervantes's recommendations.  He didn't have any questions.    Shakila Orozco CMA    ------    Notes Recorded by Stacy Byrnes LPN on 5/21/2018 at 3:10 PM  Left message for patient to call  [employer] in [place] back at [phone]    Stacy Byrnes LPN    ------    Notes Recorded by Jacey Cervantes MD on 5/21/2018 at 1:52 PM  Hi TGs, but okay to continue soriatane .Start fish oil 1g BID, recheck labs 2 weeks, fasting, TGs only

## 2018-06-07 ENCOUNTER — TELEPHONE (OUTPATIENT)
Dept: LAB | Facility: OTHER | Age: 48
End: 2018-06-07

## 2018-06-07 DIAGNOSIS — Z51.81 ENCOUNTER FOR THERAPEUTIC DRUG MONITORING: Primary | ICD-10-CM

## 2018-06-07 NOTE — TELEPHONE ENCOUNTER
This patient has an upcoming lab appointment and does not have any orders.  Please place future orders as needed.      Thanks.    Isabell Hodge MLT([facility])  [facility] [phone].

## 2018-06-08 DIAGNOSIS — Z51.81 ENCOUNTER FOR THERAPEUTIC DRUG MONITORING: ICD-10-CM

## 2018-06-08 LAB — TRIGL SERPL-MCNC: 176 MG/DL

## 2018-06-08 PROCEDURE — 36415 COLL VENOUS BLD VENIPUNCTURE: CPT | Performed by: DERMATOLOGY

## 2018-06-08 PROCEDURE — 84478 ASSAY OF TRIGLYCERIDES: CPT | Performed by: DERMATOLOGY

## 2018-06-22 ENCOUNTER — MYC MEDICAL ADVICE (OUTPATIENT)
Dept: DERMATOLOGY | Facility: CLINIC | Age: 48
End: 2018-06-22

## 2018-06-22 DIAGNOSIS — L98.0 PYOGENIC GRANULOMA: Primary | ICD-10-CM

## 2018-06-25 RX ORDER — CLOBETASOL PROPIONATE 0.5 MG/G
OINTMENT TOPICAL
Qty: 30 G | Refills: 0 | Status: SHIPPED | OUTPATIENT
Start: 2018-06-25 | End: 2019-02-15

## 2018-06-25 RX ORDER — MUPIROCIN 20 MG/G
OINTMENT TOPICAL
Qty: 30 G | Refills: 0 | Status: SHIPPED | OUTPATIENT
Start: 2018-06-25 | End: 2019-09-05

## 2018-06-25 NOTE — TELEPHONE ENCOUNTER
Yes its form the mediation    Stop the medication (should be temporary)    Start bactroban ointmetn three times daily and clobetasol 0.05% or halobetasol ointment BID for ten days. 30 grams    Bring him to clinic    If redness spreads from the nail up the finger or he gets joint pain in finger or fevers or chills call clinic because that would then be a sign of infection.

## 2018-06-25 NOTE — TELEPHONE ENCOUNTER
RN replied to patient via MyChart.  Mariia Velasco RN     History of neck pain radiating to left UE and numbness both hands. diagnosed with HNP cervical spine

## 2018-06-25 NOTE — TELEPHONE ENCOUNTER
Dr. Cervantes please review and advise on treatment plan for patient:    Thanks  Stacy Byrnes LPN        Impression/Plan:  1. Plaque psoriasis with post-inflammatory hyperpigmentation. Improved on soriatane 25mg daily but recommend increase.     Increase Soriatane to 50mg daily. Will recheck fasting lipids Q2 weeks until stable.     Maintenance:    Continue, Calcipotriene BID M-F, for the Body,     Continue, Triamcinolone 0.1% ointment BID weekends,     Continue, Face/groin Desonide 0.05% ointment BID    Flares    Continue, Body triamcinolone 0.1% ointment BID x 2-3 weeks then taper to just M-F.     Continue, Calcipotriene BID on weekends once tapered.    Continue, Face/groin Desonide 0.05% ointment BID x 2-3 weeks then taper to just M-F.    Continue, Calcipotreiene BID on weekends once tapered     Scalp start clobetasol BId for up to 2 weeks  2. Normal nails, no evidence of pyogenic granulomas today    Will monitor for changes

## 2018-06-29 ENCOUNTER — OFFICE VISIT (OUTPATIENT)
Dept: DERMATOLOGY | Facility: CLINIC | Age: 48
End: 2018-06-29
Payer: COMMERCIAL

## 2018-06-29 ENCOUNTER — RADIANT APPOINTMENT (OUTPATIENT)
Dept: GENERAL RADIOLOGY | Facility: CLINIC | Age: 48
End: 2018-06-29
Attending: DERMATOLOGY
Payer: COMMERCIAL

## 2018-06-29 DIAGNOSIS — Z76.89 ENCOUNTER PRIOR TO INITIATION OF MEDICATION: Primary | ICD-10-CM

## 2018-06-29 DIAGNOSIS — Z76.89 ENCOUNTER PRIOR TO INITIATION OF MEDICATION: ICD-10-CM

## 2018-06-29 DIAGNOSIS — L40.9 PSORIASIS: ICD-10-CM

## 2018-06-29 DIAGNOSIS — Z51.81 MEDICATION MONITORING ENCOUNTER: ICD-10-CM

## 2018-06-29 LAB
ALBUMIN SERPL-MCNC: 3.9 G/DL (ref 3.4–5)
ALP SERPL-CCNC: 79 U/L (ref 40–150)
ALT SERPL W P-5'-P-CCNC: 23 U/L (ref 0–70)
ANION GAP SERPL CALCULATED.3IONS-SCNC: 6 MMOL/L (ref 3–14)
AST SERPL W P-5'-P-CCNC: 20 U/L (ref 0–45)
BASOPHILS # BLD AUTO: 0.2 10E9/L (ref 0–0.2)
BASOPHILS NFR BLD AUTO: 1 %
BILIRUB SERPL-MCNC: 0.4 MG/DL (ref 0.2–1.3)
BUN SERPL-MCNC: 7 MG/DL (ref 7–30)
CALCIUM SERPL-MCNC: 9.2 MG/DL (ref 8.5–10.1)
CHLORIDE SERPL-SCNC: 106 MMOL/L (ref 94–109)
CO2 SERPL-SCNC: 28 MMOL/L (ref 20–32)
CREAT SERPL-MCNC: 1.12 MG/DL (ref 0.66–1.25)
DIFFERENTIAL METHOD BLD: ABNORMAL
EOSINOPHIL # BLD AUTO: 0.6 10E9/L (ref 0–0.7)
EOSINOPHIL NFR BLD AUTO: 4.1 %
ERYTHROCYTE [DISTWIDTH] IN BLOOD BY AUTOMATED COUNT: 12.9 % (ref 10–15)
GFR SERPL CREATININE-BSD FRML MDRD: 70 ML/MIN/1.7M2
GLUCOSE SERPL-MCNC: 92 MG/DL (ref 70–99)
HCT VFR BLD AUTO: 41.6 % (ref 40–53)
HGB BLD-MCNC: 14.1 G/DL (ref 13.3–17.7)
IMM GRANULOCYTES # BLD: 0.1 10E9/L (ref 0–0.4)
IMM GRANULOCYTES NFR BLD: 0.7 %
LYMPHOCYTES # BLD AUTO: 3.2 10E9/L (ref 0.8–5.3)
LYMPHOCYTES NFR BLD AUTO: 20.7 %
MCH RBC QN AUTO: 30.9 PG (ref 26.5–33)
MCHC RBC AUTO-ENTMCNC: 33.9 G/DL (ref 31.5–36.5)
MCV RBC AUTO: 91 FL (ref 78–100)
MONOCYTES # BLD AUTO: 1 10E9/L (ref 0–1.3)
MONOCYTES NFR BLD AUTO: 6.2 %
NEUTROPHILS # BLD AUTO: 10.4 10E9/L (ref 1.6–8.3)
NEUTROPHILS NFR BLD AUTO: 67.3 %
PLATELET # BLD AUTO: 366 10E9/L (ref 150–450)
POTASSIUM SERPL-SCNC: 3.6 MMOL/L (ref 3.4–5.3)
PROT SERPL-MCNC: 7.7 G/DL (ref 6.8–8.8)
RBC # BLD AUTO: 4.56 10E12/L (ref 4.4–5.9)
SODIUM SERPL-SCNC: 140 MMOL/L (ref 133–144)
WBC # BLD AUTO: 15.5 10E9/L (ref 4–11)

## 2018-06-29 PROCEDURE — 86735 MUMPS ANTIBODY: CPT | Performed by: DERMATOLOGY

## 2018-06-29 PROCEDURE — 86480 TB TEST CELL IMMUN MEASURE: CPT | Performed by: DERMATOLOGY

## 2018-06-29 PROCEDURE — 86765 RUBEOLA ANTIBODY: CPT | Performed by: DERMATOLOGY

## 2018-06-29 PROCEDURE — 86705 HEP B CORE ANTIBODY IGM: CPT | Performed by: DERMATOLOGY

## 2018-06-29 PROCEDURE — 87389 HIV-1 AG W/HIV-1&-2 AB AG IA: CPT | Performed by: DERMATOLOGY

## 2018-06-29 PROCEDURE — 86803 HEPATITIS C AB TEST: CPT | Performed by: DERMATOLOGY

## 2018-06-29 PROCEDURE — 71046 X-RAY EXAM CHEST 2 VIEWS: CPT | Performed by: RADIOLOGY

## 2018-06-29 PROCEDURE — 86706 HEP B SURFACE ANTIBODY: CPT | Performed by: DERMATOLOGY

## 2018-06-29 PROCEDURE — 99213 OFFICE O/P EST LOW 20 MIN: CPT | Performed by: DERMATOLOGY

## 2018-06-29 PROCEDURE — 86704 HEP B CORE ANTIBODY TOTAL: CPT | Performed by: DERMATOLOGY

## 2018-06-29 PROCEDURE — 36415 COLL VENOUS BLD VENIPUNCTURE: CPT | Performed by: DERMATOLOGY

## 2018-06-29 PROCEDURE — 86762 RUBELLA ANTIBODY: CPT | Performed by: DERMATOLOGY

## 2018-06-29 PROCEDURE — 87340 HEPATITIS B SURFACE AG IA: CPT | Performed by: DERMATOLOGY

## 2018-06-29 PROCEDURE — 85025 COMPLETE CBC W/AUTO DIFF WBC: CPT | Performed by: DERMATOLOGY

## 2018-06-29 PROCEDURE — 80053 COMPREHEN METABOLIC PANEL: CPT | Performed by: DERMATOLOGY

## 2018-06-29 NOTE — LETTER
6/29/2018         RE: To White  [address] [address] [place] [id number]        Dear Colleague,    Thank you for referring your patient, To White, to the [facility]. Please see a copy of my visit note below.    [facility] Dermatology Note      Dermatology Problem List:  1.Plaque psoriasis: 1-3% TBSA  -Current Tx: Soriatane 25 mg daily stopped due to pyogenic granulomas 6/2018,  triamcinolone 0.1% ointment, desonide 0.05% ointment, calcipotriene 0.005% ointment  2. Onycholysis of distal right toenails, May be in association with psoriasis.    Encounter Date: Jun 29, 2018    CC:  Chief Complaint   Patient presents with     Psoriasis         History of Present Illness:  Mr. To White is a 48 year old male who presents as a follow up for psoriasis. The patient was last seen 5/1/18 when his Soriatane was increased to 50 mg daily. However, the patient stopped Soriatane on account of the side effect of the development of granulomatous tissue around the fingers. He has continued the use of his other prescribed topicals, however. He feels these are improving No history of depression.       Past Medical History:   Patient Active Problem List   Diagnosis     Other psoriasis     Obesity     Essential hypertension with goal blood pressure less than 140/90     Past Medical History:   Diagnosis Date     Essential hypertension, benign 2000     Hypertension      Obesity      Other psoriasis     since childhood     Past Surgical History:   Procedure Laterality Date     CHOLECYSTECTOMY       LAPAROSCOPIC CHOLECYSTECTOMY  7/3/2012    Procedure: LAPAROSCOPIC CHOLECYSTECTOMY;  Laparoscopic Cholecystectomy;  Surgeon: Kulwinder Hollins MD;  Location:  OR     NO HISTORY OF SURGERY         Social History:  The patient works as an .   Kept in chart for convenience.       Family History:  There is no family history of skin cancer.  Kept in chart for convenience.        Medications:  Current Outpatient Prescriptions   Medication Sig Dispense Refill     amLODIPine (NORVASC) 10 MG tablet Take 1 tablet (10 mg) by mouth daily 90 tablet 3     benazepril (LOTENSIN) 40 MG tablet TAKE 1 TABLET BY MOUTH  DAILY 90 tablet 3     calcipotriene (DOVONOX) 0.005 % cream Apply topically 2 times daily 120 g 1     calcipotriene 0.005 % OINT Apply to the affected area twice a day Mon-Fri when rash is relatively calm. 240 g 3     clobetasol (TEMOVATE) 0.05 % external solution Apply on scalp for up to 2 weeks in a row for flares 50 mL 3     clobetasol (TEMOVATE) 0.05 % ointment Apply twice daily to the nails for 10 days 30 g 0     desonide (DESOWEN) 0.05 % ointment Apply to the affected area of the skin of groin or face daily for 2-3 week bursts as needed. 60 g 6     Fexofenadine HCl (ALLEGRA PO) Reported on 5/19/2017       fluocinonide (LIDEX) 0.05 % cream Apply topically 2 times daily 120 g 1     hydrOXYzine (ATARAX) 25 MG tablet Take 1-2 tablets (25-50 mg) by mouth every 6 hours as needed for itching 60 tablet 1     mupirocin (BACTROBAN) 2 % ointment Apply 3 times daily to the nails for 10 days. 30 g 0     Pseudoephedrine-Ibuprofen (ADVIL COLD/SINUS PO) Reported on 5/19/2017       triamcinolone (KENALOG) 0.1 % ointment Apply to affected areas of the trunk twice a day when flaring, M-F when improved, weekend only when relatively calm. 454 g 6     acitretin (SORIATANE) 25 MG CAPS capsule Take 2 capsules (50 mg) by mouth daily (Patient not taking: Reported on 6/29/2018) 60 capsule 4       Allergies   Allergen Reactions     No Known Allergies        Review of Systems:  -Skin: As per HPI.   -Constitutional: The patient is doing generally well.     Physical exam:  Vitals: There were no vitals taken for this visit.  GEN: This is a well developed, well-nourished male in no acute distress, in a pleasant mood.    SKIN: Focused examination of the hands, back, and thighs was performed.  - 4 mm red  granulomatous papule on the left 3rd digit  - Erythema and crust on the right 3rd digit  -psoriatic plaques on the back, improved since last visit  - Hyperpigmented patches on the back, right buttock.   -No other lesions of concern on areas examined.       Impression/Plan:  1. Plaque psoriasis with post-inflammatory hyperpigmentation. Improved on soriatane 25mg daily but we need to stop due to pyogenic granulomas.   Hold Soriatane . Continue bactroban and topical steroid as per scripts.   Discussed other options with patient now that he failed Soriatane treatment, including Otezla, Humira, and methotrexate. After discussion of options, patient elects to try Humira.  Start Humira 40mg, inject first every week for two weeks, then every other week pending normal labs.Side effects of treatment with biologic therapy were reviewed including but not limited to immune suppression, increased susceptibility to infection, injection site and systemic reactions, and possible increased risk of lymphoma and other malignancy. The patient denies history of hepatitis, personal or first degree relative with demyelinating disease, history of heart failure, history of malignancy, hematologic disorders, other immune compromising medications/disorders, tuberculosis or BCG vaccination. Discussed need to contact clinic if patient is ill or not feeling well as we may hold medication. Patient is up to date on vaccinations. Discussed that live vaccinations should not be given while on this medication and clinic should be contact prior to vaccinations.  Per the Medical Board of the National Psoriasis Foundation: Vaccination in adult patients on systemic therapy for psoriasis includes need for MMR titer or history, Shingles vaccination, up to date tetanus, and pneumonia vaccine.    Patient should also get yearly flu vaccination.    Baseline labs obtained including HIV, hepatitis B surface antigen, hepatitis core IgM and IgG(if core positive with  consult hepatology), hepatitis B surface antibody,  hepatitis C antibody, baseline CBC, alt/ast, MMR titer, CXR and tb testing.    Will recheck CBC, alt/ast with diff, one week after starting    Maintenance:    Continue, Calcipotriene BID M-F, for the Body,     Continue, Triamcinolone 0.1% ointment BID weekends,     Continue, Face/groin Desonide 0.05% ointment BID    Flares    Continue, Body triamcinolone 0.1% ointment BID x 2-3 weeks then taper to just M-F.     Continue, Calcipotriene BID on weekends once tapered.    Continue, Face/groin Desonide 0.05% ointment BID x 2-3 weeks then taper to just M-F.    Continue, Calcipotreiene BID on weekends once tapered     Scalp start clobetasol BId for up to 2 weeks    2. Pyogenic granulomas, bilateral digits. Patient has stopped soriatane on account of this.  Follow-up in 4 monts, earlier for new or changing lesions.       Staff Involved:  Scribe/Staff    Scribe Disclosure  I, Barrett Powell, am serving as a scribe to document services personally performed by Dr. Jacey Cervantes MD, based on data collection and the provider's statements to me.     Provider Disclosure:   The documentation recorded by the scribe accurately reflects the services I personally performed and the decisions made by me.    Jacey Cervantes MD    Department of Dermatology  [facility]: Phone: [phone], Fax:[phone]  [facility] [place]: Phone: [phone], Fax: [phone]                Again, thank you for allowing me to participate in the care of your patient.        Sincerely,        Jacey Cervantes MD

## 2018-06-29 NOTE — NURSING NOTE
@To White's goals for this visit include:   Chief Complaint   Patient presents with     Psoriasis       He requests these members of his care team be copied on today's visit information: NO    PCP: No Ref-Primary, Physician    Referring Provider:  No referring provider defined for this encounter.    There were no vitals taken for this visit.    Do you need any medication refills at today's visit? NO    Shakila Orozco [facility]

## 2018-06-29 NOTE — PROGRESS NOTES
[facility] Dermatology Note      Dermatology Problem List:  1.Plaque psoriasis: 1-3% TBSA  -Current Tx: Soriatane 25 mg daily stopped due to pyogenic granulomas 6/2018,  triamcinolone 0.1% ointment, desonide 0.05% ointment, calcipotriene 0.005% ointment  2. Onycholysis of distal right toenails, May be in association with psoriasis.    Encounter Date: Jun 29, 2018    CC:  Chief Complaint   Patient presents with     Psoriasis         History of Present Illness:  Mr. To White is a 48 year old male who presents as a follow up for psoriasis. The patient was last seen 5/1/18 when his Soriatane was increased to 50 mg daily. However, the patient stopped Soriatane on account of the side effect of the development of granulomatous tissue around the fingers. He has continued the use of his other prescribed topicals, however. He feels these are improving No history of depression.       Past Medical History:   Patient Active Problem List   Diagnosis     Other psoriasis     Obesity     Essential hypertension with goal blood pressure less than 140/90     Past Medical History:   Diagnosis Date     Essential hypertension, benign 2000     Hypertension      Obesity      Other psoriasis     since childhood     Past Surgical History:   Procedure Laterality Date     CHOLECYSTECTOMY       LAPAROSCOPIC CHOLECYSTECTOMY  7/3/2012    Procedure: LAPAROSCOPIC CHOLECYSTECTOMY;  Laparoscopic Cholecystectomy;  Surgeon: Kulwinder Hollins MD;  Location:  OR     NO HISTORY OF SURGERY         Social History:  The patient works as an .   Kept in chart for convenience.       Family History:  There is no family history of skin cancer.  Kept in chart for convenience.       Medications:  Current Outpatient Prescriptions   Medication Sig Dispense Refill     amLODIPine (NORVASC) 10 MG tablet Take 1 tablet (10 mg) by mouth daily 90 tablet 3     benazepril (LOTENSIN) 40 MG tablet TAKE 1 TABLET BY MOUTH  DAILY 90 tablet 3      calcipotriene (DOVONOX) 0.005 % cream Apply topically 2 times daily 120 g 1     calcipotriene 0.005 % OINT Apply to the affected area twice a day Mon-Fri when rash is relatively calm. 240 g 3     clobetasol (TEMOVATE) 0.05 % external solution Apply on scalp for up to 2 weeks in a row for flares 50 mL 3     clobetasol (TEMOVATE) 0.05 % ointment Apply twice daily to the nails for 10 days 30 g 0     desonide (DESOWEN) 0.05 % ointment Apply to the affected area of the skin of groin or face daily for 2-3 week bursts as needed. 60 g 6     Fexofenadine HCl (ALLEGRA PO) Reported on 5/19/2017       fluocinonide (LIDEX) 0.05 % cream Apply topically 2 times daily 120 g 1     hydrOXYzine (ATARAX) 25 MG tablet Take 1-2 tablets (25-50 mg) by mouth every 6 hours as needed for itching 60 tablet 1     mupirocin (BACTROBAN) 2 % ointment Apply 3 times daily to the nails for 10 days. 30 g 0     Pseudoephedrine-Ibuprofen (ADVIL COLD/SINUS PO) Reported on 5/19/2017       triamcinolone (KENALOG) 0.1 % ointment Apply to affected areas of the trunk twice a day when flaring, M-F when improved, weekend only when relatively calm. 454 g 6     acitretin (SORIATANE) 25 MG CAPS capsule Take 2 capsules (50 mg) by mouth daily (Patient not taking: Reported on 6/29/2018) 60 capsule 4       Allergies   Allergen Reactions     No Known Allergies        Review of Systems:  -Skin: As per HPI.   -Constitutional: The patient is doing generally well.     Physical exam:  Vitals: There were no vitals taken for this visit.  GEN: This is a well developed, well-nourished male in no acute distress, in a pleasant mood.    SKIN: Focused examination of the hands, back, and thighs was performed.  - 4 mm red granulomatous papule on the left 3rd digit  - Erythema and crust on the right 3rd digit  -psoriatic plaques on the back, improved since last visit  - Hyperpigmented patches on the back, right buttock.   -No other lesions of concern on areas examined.        Impression/Plan:  1. Plaque psoriasis with post-inflammatory hyperpigmentation. Improved on soriatane 25mg daily but we need to stop due to pyogenic granulomas.   Hold Soriatane . Continue bactroban and topical steroid as per scripts.   Discussed other options with patient now that he failed Soriatane treatment, including Otezla, Humira, and methotrexate. After discussion of options, patient elects to try Humira.  Start Humira 40mg, inject first every week for two weeks, then every other week pending normal labs.Side effects of treatment with biologic therapy were reviewed including but not limited to immune suppression, increased susceptibility to infection, injection site and systemic reactions, and possible increased risk of lymphoma and other malignancy. The patient denies history of hepatitis, personal or first degree relative with demyelinating disease, history of heart failure, history of malignancy, hematologic disorders, other immune compromising medications/disorders, tuberculosis or BCG vaccination. Discussed need to contact clinic if patient is ill or not feeling well as we may hold medication. Patient is up to date on vaccinations. Discussed that live vaccinations should not be given while on this medication and clinic should be contact prior to vaccinations.  Per the Medical Board of the National Psoriasis Foundation: Vaccination in adult patients on systemic therapy for psoriasis includes need for MMR titer or history, Shingles vaccination, up to date tetanus, and pneumonia vaccine.    Patient should also get yearly flu vaccination.    Baseline labs obtained including HIV, hepatitis B surface antigen, hepatitis core IgM and IgG(if core positive with consult hepatology), hepatitis B surface antibody,  hepatitis C antibody, baseline CBC, alt/ast, MMR titer, CXR and tb testing.    Will recheck CBC, alt/ast with diff, one week after starting    Maintenance:    Continue, Calcipotriene BID M-F, for  the Body,     Continue, Triamcinolone 0.1% ointment BID weekends,     Continue, Face/groin Desonide 0.05% ointment BID    Flares    Continue, Body triamcinolone 0.1% ointment BID x 2-3 weeks then taper to just M-F.     Continue, Calcipotriene BID on weekends once tapered.    Continue, Face/groin Desonide 0.05% ointment BID x 2-3 weeks then taper to just M-F.    Continue, Calcipotreiene BID on weekends once tapered     Scalp start clobetasol BId for up to 2 weeks    2. Pyogenic granulomas, bilateral digits. Patient has stopped soriatane on account of this.  Follow-up in 4 monts, earlier for new or changing lesions.       Staff Involved:  Scribe/Staff    Scribe Disclosure  I, Barrett Powell, am serving as a scribe to document services personally performed by Dr. Jacey Cervantes MD, based on data collection and the provider's statements to me.     Provider Disclosure:   The documentation recorded by the scribe accurately reflects the services I personally performed and the decisions made by me.    Jacey Cervantes MD    Department of Dermatology  [facility]: Phone: [phone], Fax:[phone]  [facility]: Phone: [phone], Fax: [phone]

## 2018-06-29 NOTE — MR AVS SNAPSHOT
After Visit Summary   6/29/2018    To White    MRN: [id number]           Patient Information     Date Of Birth          1970        Visit Information        Provider Department      6/29/2018 3:45 PM Jacey Cervantes MD [facility]        Today's Diagnoses     Encounter prior to initiation of medication    -  1    Medication monitoring encounter           Follow-ups after your visit        Your next 10 appointments already scheduled     Oct 30, 2018  4:30 PM CDT   Return Visit with Jacey Cervantes MD   [facility]    [address] [place] [id number] [phone]              Future tests that were ordered for you today     Open Future Orders        Priority Expected Expires Ordered    CBC with platelets differential Routine 7/29/2018 8/29/2018 6/29/2018    ALT Routine 7/29/2018 8/29/2018 6/29/2018    AST Routine 7/29/2018 8/29/2018 6/29/2018    X-ray Chest 2 vws* Routine 6/29/2018 6/29/2019 6/29/2018            Who to contact     If you have questions or need follow up information about today's clinic visit or your schedule please contact [facility] directly at [phone].  Normal or non-critical lab and imaging results will be communicated to you by Launch[employer] Toyshart, letter or phone within 4 business days after the clinic has received the results. If you do not hear from us within 7 days, please contact the clinic through [employer] or phone. If you have a critical or abnormal lab result, we will notify you by phone as soon as possible.  Submit refill requests through Qualys or call your pharmacy and they will forward the refill request to us. Please allow 3 business days for your refill to be completed.          Additional Information About Your Visit        [employer] Information     Qualys gives you secure access to your electronic health record. If you see a primary care provider, you can also send messages  to your care team and make appointments. If you have questions, please call your primary care clinic.  If you do not have a primary care provider, please call [phone] and they will assist you.      Samares is an electronic gateway that provides easy, online access to your medical records. With Samares, you can request a clinic appointment, read your test results, renew a prescription or communicate with your care team.     To access your existing account, please contact your [facility] or call [phone] for assistance.        Care EveryWhere ID     This is your Care EveryWhere ID. This could be used by other organizations to access your [facility] medical records  [id number]         Blood Pressure from Last 3 Encounters:   01/19/18 116/76   05/19/17 126/74   05/10/17 113/80    Weight from Last 3 Encounters:   01/19/18 117.5 kg (259 lb)   05/19/17 118.4 kg (261 lb)   04/13/17 116.1 kg (256 lb)              We Performed the Following     CBC with platelets differential     Comprehensive metabolic panel     Hepatitis B core antibody IgM     Hepatitis B core antibody     Hepatitis B Surface Antibody     Hepatitis B surface antigen     Hepatitis C antibody     HIV Antigen Antibody Combo     M Tuberculosis by [employer]     Measles/Mumps/Rubella Immunity ([facility])        Primary Care Provider Fax #    Physician No Ref-Primary [phone]       No address on file        Equal Access to Services     BHUPENDRA JOHNSON : Hadii aad ku hadasho Soomaali, waaxda luqadaha, qaybta kaalmada adeegyada, beni alonzo. So [facility] [phone].    ATENCIÓN: Si habla español, tiene a watkins disposición servicios gratuitos de asistencia lingüística. Llame al [phone].    We comply with applicable federal civil rights laws and [place] laws. We do not discriminate on the basis of race, color, national origin, age, disability, sex, sexual orientation, or gender identity.             Thank you!     Thank you for choosing [facility]  for your care. Our goal is always to provide you with excellent care. Hearing back from our patients is one way we can continue to improve our services. Please take a few minutes to complete the written survey that you may receive in the mail after your visit with us. Thank you!             Your Updated Medication List - Protect others around you: Learn how to safely use, store and throw away your medicines at www.disposemymeds.org.          This list is accurate as of 6/29/18  4:34 PM.  Always use your most recent med list.                   Brand Name Dispense Instructions for use Diagnosis    acitretin 25 MG Caps capsule    SORIATANE    60 capsule    Take 2 capsules (50 mg) by mouth daily    Psoriasis       ADVIL COLD/SINUS PO      Reported on 5/19/2017        ALLEGRA PO      Reported on 5/19/2017        amLODIPine 10 MG tablet    NORVASC    90 tablet    Take 1 tablet (10 mg) by mouth daily    Essential hypertension with goal blood pressure less than 140/90       benazepril 40 MG tablet    LOTENSIN    90 tablet    TAKE 1 TABLET BY MOUTH  DAILY    Essential hypertension with goal blood pressure less than 140/90       * calcipotriene 0.005 % cream    DOVONOX    120 g    Apply topically 2 times daily    Other psoriasis       * calcipotriene 0.005 % Oint     240 g    Apply to the affected area twice a day Mon-Fri when rash is relatively calm.    Plaque psoriasis       * clobetasol 0.05 % external solution    TEMOVATE    50 mL    Apply on scalp for up to 2 weeks in a row for flares    Psoriasis       * clobetasol 0.05 % ointment    TEMOVATE    30 g    Apply twice daily to the nails for 10 days    Pyogenic granuloma       desonide 0.05 % ointment    DESOWEN    60 g    Apply to the affected area of the skin of groin or face daily for 2-3 week bursts as needed.    Plaque psoriasis       fluocinonide 0.05 % cream    LIDEX    120 g    Apply topically 2  times daily    Other psoriasis       hydrOXYzine 25 MG tablet    ATARAX    60 tablet    Take 1-2 tablets (25-50 mg) by mouth every 6 hours as needed for itching    Itching       mupirocin 2 % ointment    BACTROBAN    30 g    Apply 3 times daily to the nails for 10 days.    Pyogenic granuloma       triamcinolone 0.1 % ointment    KENALOG    454 g    Apply to affected areas of the trunk twice a day when flaring, M-F when improved, weekend only when relatively calm.    Plaque psoriasis       * Notice:  This list has 4 medication(s) that are the same as other medications prescribed for you. Read the directions carefully, and ask your doctor or other care provider to review them with you.

## 2018-07-02 LAB
GAMMA INTERFERON BACKGROUND BLD IA-ACNC: 0.08 IU/ML
HBV CORE AB SERPL QL IA: NONREACTIVE
HBV CORE IGM SERPL QL IA: NONREACTIVE
HBV SURFACE AB SERPL IA-ACNC: 0 M[IU]/ML
HBV SURFACE AG SERPL QL IA: NONREACTIVE
HCV AB SERPL QL IA: NONREACTIVE
HIV 1+2 AB+HIV1 P24 AG SERPL QL IA: NONREACTIVE
M TB IFN-G BLD-IMP: NEGATIVE
M TB IFN-G CD4+ BCKGRND COR BLD-ACNC: >10 IU/ML
MEV IGG SER QL IA: 1.3 AI (ref 0–0.8)
MITOGEN IGNF BCKGRD COR BLD-ACNC: 0 IU/ML
MITOGEN IGNF BCKGRD COR BLD-ACNC: 0 IU/ML
MUV IGG SER QL IA: 2.7 AI (ref 0–0.8)
RUBV IGG SERPL IA-ACNC: 12 IU/ML

## 2018-07-03 ENCOUNTER — ALLIED HEALTH/NURSE VISIT (OUTPATIENT)
Dept: FAMILY MEDICINE | Facility: OTHER | Age: 48
End: 2018-07-03
Payer: COMMERCIAL

## 2018-07-03 DIAGNOSIS — Z23 NEED FOR VACCINATION: Primary | ICD-10-CM

## 2018-07-03 PROCEDURE — 90750 HZV VACC RECOMBINANT IM: CPT

## 2018-07-03 PROCEDURE — 90471 IMMUNIZATION ADMIN: CPT

## 2018-07-03 PROCEDURE — 90732 PPSV23 VACC 2 YRS+ SUBQ/IM: CPT

## 2018-07-03 PROCEDURE — 99207 ZZC NO CHARGE NURSE ONLY: CPT

## 2018-07-03 PROCEDURE — 90472 IMMUNIZATION ADMIN EACH ADD: CPT

## 2018-07-03 NOTE — NURSING NOTE
Screening Questionnaire for Adult Immunization    Are you sick today?   No   Do you have allergies to medications, food, a vaccine component or latex?   No   Have you ever had a serious reaction after receiving a vaccination?   No   Do you have a long-term health problem with heart disease, lung disease, asthma, kidney disease, metabolic disease (e.g. diabetes), anemia, or other blood disorder?   No   Do you have cancer, leukemia, HIV/AIDS, or any other immune system problem?   No   In the past 3 months, have you taken medications that affect  your immune system, such as prednisone, other steroids, or anticancer drugs; drugs for the treatment of rheumatoid arthritis, Crohn s disease, or psoriasis; or have you had radiation treatments?   No   Have you had a seizure, or a brain or other nervous system problem?   No   During the past year, have you received a transfusion of blood or blood     products, or been given immune (gamma) globulin or antiviral drug?   No   For women: Are you pregnant or is there a chance you could become        pregnant during the next month?   No   Have you received any vaccinations in the past 4 weeks?   No     Immunization questionnaire answers were all negative.        Per orders of Maggie Pepper, injection of Shingrix and [id number] given by Lori Encarnacion. Patient instructed to remain in clinic for 15 minutes afterwards, and to report any adverse reaction to me immediately.  Prior to injection verified patient identity using patient's name and date of birth.    Screening performed by Lori Encarnacion on 7/3/2018 at 10:54 AM.

## 2018-07-03 NOTE — MR AVS SNAPSHOT
After Visit Summary   7/3/2018    To White    MRN: [id number]           Patient Information     Date Of Birth          1970        Visit Information        Provider Department      7/3/2018 10:30 AM MADISON HERNÁNDEZ NURSE [facility]        Today's Diagnoses     Need for vaccination    -  1       Follow-ups after your visit        Your next 10 appointments already scheduled     Oct 30, 2018  4:30 PM CDT   Return Visit with Jacey Cervantes MD   [facility] ([facility])    [id number] [place] [id number] [phone]              Who to contact     If you have questions or need follow up information about today's clinic visit or your schedule please contact [facility] directly at [phone].  Normal or non-critical lab and imaging results will be communicated to you by MyChart, letter or phone within 4 business days after the clinic has received the results. If you do not hear from us within 7 days, please contact the clinic through MyChart or phone. If you have a critical or abnormal lab result, we will notify you by phone as soon as possible.  Submit refill requests through Fuzz or call your pharmacy and they will forward the refill request to us. Please allow 3 business days for your refill to be completed.          Additional Information About Your Visit        MyChart Information     Fuzz gives you secure access to your electronic health record. If you see a primary care provider, you can also send messages to your care team and make appointments. If you have questions, please call your primary care clinic.  If you do not have a primary care provider, please call [phone] and they will assist you.        Care EveryWhere ID     This is your Care EveryWhere ID. This could be used by other organizations to access your [place] medical records  [id number]         Blood Pressure from Last 3 Encounters:    01/19/18 116/76   05/19/17 126/74   05/10/17 113/80    Weight from Last 3 Encounters:   01/19/18 259 lb (117.5 kg)   05/19/17 261 lb (118.4 kg)   04/13/17 256 lb (116.1 kg)              We Performed the Following     ADMIN MEDICARE: Pneumococcal Vaccine ()     ADMIN: Vaccine, Initial (60131)     Pneumococcal vaccine 23 valent PPSV23  (Pneumovax) [67463]     SHINGRIX [44195]        Primary Care Provider Fax #    Physician No Ref-Primary [phone]       No address on file        Equal Access to Services     [facility] : Hadii crystal Kim, vahid negron, elvia hernandezalmabobbi mazariegos, beni gaines . So [facility] [place] [phone].    ATENCIÓN: Si habla español, tiene a watkins disposición servicios gratuitos de asistencia lingüística. Llame al [phone].    We comply with applicable federal civil rights laws and [place] laws. We do not discriminate on the basis of race, color, national origin, age, disability, sex, sexual orientation, or gender identity.            Thank you!     Thank you for choosing [facility]  for your care. Our goal is always to provide you with excellent care. Hearing back from our patients is one way we can continue to improve our services. Please take a few minutes to complete the written survey that you may receive in the mail after your visit with us. Thank you!             Your Updated Medication List - Protect others around you: Learn how to safely use, store and throw away your medicines at www.disposemymeds.org.          This list is accurate as of 7/3/18 11:02 AM.  Always use your most recent med list.                   Brand Name Dispense Instructions for use Diagnosis    acitretin 25 MG Caps capsule    SORIATANE    60 capsule    Take 2 capsules (50 mg) by mouth daily    Psoriasis       ADVIL COLD/SINUS PO      Reported on 5/19/2017        ALLEGRA PO      Reported on 5/19/2017        amLODIPine 10 MG tablet    NORVASC    90 tablet     Take 1 tablet (10 mg) by mouth daily    Essential hypertension with goal blood pressure less than 140/90       benazepril 40 MG tablet    LOTENSIN    90 tablet    TAKE 1 TABLET BY MOUTH  DAILY    Essential hypertension with goal blood pressure less than 140/90       * calcipotriene 0.005 % cream    DOVONOX    120 g    Apply topically 2 times daily    Other psoriasis       * calcipotriene 0.005 % Oint     240 g    Apply to the affected area twice a day Mon-Fri when rash is relatively calm.    Plaque psoriasis       * clobetasol 0.05 % external solution    TEMOVATE    50 mL    Apply on scalp for up to 2 weeks in a row for flares    Psoriasis       * clobetasol 0.05 % ointment    TEMOVATE    30 g    Apply twice daily to the nails for 10 days    Pyogenic granuloma       desonide 0.05 % ointment    DESOWEN    60 g    Apply to the affected area of the skin of groin or face daily for 2-3 week bursts as needed.    Plaque psoriasis       fluocinonide 0.05 % cream    LIDEX    120 g    Apply topically 2 times daily    Other psoriasis       hydrOXYzine 25 MG tablet    ATARAX    60 tablet    Take 1-2 tablets (25-50 mg) by mouth every 6 hours as needed for itching    Itching       mupirocin 2 % ointment    BACTROBAN    30 g    Apply 3 times daily to the nails for 10 days.    Pyogenic granuloma       triamcinolone 0.1 % ointment    KENALOG    454 g    Apply to affected areas of the trunk twice a day when flaring, M-F when improved, weekend only when relatively calm.    Plaque psoriasis       * Notice:  This list has 4 medication(s) that are the same as other medications prescribed for you. Read the directions carefully, and ask your doctor or other care provider to review them with you.

## 2018-07-06 DIAGNOSIS — D72.829 ELEVATED WBC COUNT: Primary | ICD-10-CM

## 2018-07-09 ENCOUNTER — TELEPHONE (OUTPATIENT)
Dept: DERMATOLOGY | Facility: CLINIC | Age: 48
End: 2018-07-09

## 2018-07-09 NOTE — TELEPHONE ENCOUNTER
PA Initiation    Medication: Humira 40MG/0.8ML Pen (Pending)  Insurance Company: [employer] ([facility]) - Phone [phone] Fax [phone]  [place]illing the [place] [facility] [place] [address]  Start Date: 7/9/2018

## 2018-07-10 NOTE — TELEPHONE ENCOUNTER
Prior Authorization Approval    Authorization Effective Date: 7/9/2018  Authorization Expiration Date: 7/9/2019  Medication: Humira 40MG/0.8ML Pen (APPROVED)  Approved Dose/Quantity: 4   Reference #: [id number] [id number]   Insurance Company: [employer] ([facility]) - Phone [phone] Fax [phone]    CoPay Card Available: Yes     Which Pharmacy is filling the prescription (Not needed for infusion/clinic administered): [facility] [place] [address]  Pharmacy Notified: Yes  Patient Notified: Yes

## 2018-07-13 DIAGNOSIS — Z51.81 MEDICATION MONITORING ENCOUNTER: ICD-10-CM

## 2018-07-13 LAB
ALT SERPL W P-5'-P-CCNC: 19 U/L (ref 0–70)
AST SERPL W P-5'-P-CCNC: 15 U/L (ref 0–45)
BASOPHILS # BLD AUTO: 0.1 10E9/L (ref 0–0.2)
BASOPHILS NFR BLD AUTO: 0.6 %
DIFFERENTIAL METHOD BLD: NORMAL
EOSINOPHIL # BLD AUTO: 0.4 10E9/L (ref 0–0.7)
EOSINOPHIL NFR BLD AUTO: 3.4 %
ERYTHROCYTE [DISTWIDTH] IN BLOOD BY AUTOMATED COUNT: 13 % (ref 10–15)
HCT VFR BLD AUTO: 41.6 % (ref 40–53)
HGB BLD-MCNC: 13.9 G/DL (ref 13.3–17.7)
LYMPHOCYTES # BLD AUTO: 3.8 10E9/L (ref 0.8–5.3)
LYMPHOCYTES NFR BLD AUTO: 34.5 %
MCH RBC QN AUTO: 30.3 PG (ref 26.5–33)
MCHC RBC AUTO-ENTMCNC: 33.4 G/DL (ref 31.5–36.5)
MCV RBC AUTO: 91 FL (ref 78–100)
MONOCYTES # BLD AUTO: 0.8 10E9/L (ref 0–1.3)
MONOCYTES NFR BLD AUTO: 7.3 %
NEUTROPHILS # BLD AUTO: 5.9 10E9/L (ref 1.6–8.3)
NEUTROPHILS NFR BLD AUTO: 54.2 %
PLATELET # BLD AUTO: 369 10E9/L (ref 150–450)
RBC # BLD AUTO: 4.58 10E12/L (ref 4.4–5.9)
WBC # BLD AUTO: 10.9 10E9/L (ref 4–11)

## 2018-07-13 PROCEDURE — 84460 ALANINE AMINO (ALT) (SGPT): CPT | Performed by: DERMATOLOGY

## 2018-07-13 PROCEDURE — 85025 COMPLETE CBC W/AUTO DIFF WBC: CPT | Performed by: DERMATOLOGY

## 2018-07-13 PROCEDURE — 36415 COLL VENOUS BLD VENIPUNCTURE: CPT | Performed by: DERMATOLOGY

## 2018-07-13 PROCEDURE — 84450 TRANSFERASE (AST) (SGOT): CPT | Performed by: DERMATOLOGY

## 2018-10-30 ENCOUNTER — OFFICE VISIT (OUTPATIENT)
Dept: DERMATOLOGY | Facility: CLINIC | Age: 48
End: 2018-10-30
Payer: COMMERCIAL

## 2018-10-30 DIAGNOSIS — L40.9 PSORIASIS: Primary | ICD-10-CM

## 2018-10-30 LAB
ALT SERPL W P-5'-P-CCNC: 28 U/L (ref 0–70)
AST SERPL W P-5'-P-CCNC: 19 U/L (ref 0–45)
BASOPHILS # BLD AUTO: 0.1 10E9/L (ref 0–0.2)
BASOPHILS NFR BLD AUTO: 1.1 %
DIFFERENTIAL METHOD BLD: ABNORMAL
EOSINOPHIL # BLD AUTO: 0.8 10E9/L (ref 0–0.7)
EOSINOPHIL NFR BLD AUTO: 6.9 %
ERYTHROCYTE [DISTWIDTH] IN BLOOD BY AUTOMATED COUNT: 12.9 % (ref 10–15)
HCT VFR BLD AUTO: 42.4 % (ref 40–53)
HGB BLD-MCNC: 14.2 G/DL (ref 13.3–17.7)
IMM GRANULOCYTES # BLD: 0.1 10E9/L (ref 0–0.4)
IMM GRANULOCYTES NFR BLD: 0.5 %
LYMPHOCYTES # BLD AUTO: 3.8 10E9/L (ref 0.8–5.3)
LYMPHOCYTES NFR BLD AUTO: 34.9 %
MCH RBC QN AUTO: 29.8 PG (ref 26.5–33)
MCHC RBC AUTO-ENTMCNC: 33.5 G/DL (ref 31.5–36.5)
MCV RBC AUTO: 89 FL (ref 78–100)
MONOCYTES # BLD AUTO: 0.9 10E9/L (ref 0–1.3)
MONOCYTES NFR BLD AUTO: 8.1 %
NEUTROPHILS # BLD AUTO: 5.3 10E9/L (ref 1.6–8.3)
NEUTROPHILS NFR BLD AUTO: 48.5 %
PLATELET # BLD AUTO: 383 10E9/L (ref 150–450)
RBC # BLD AUTO: 4.77 10E12/L (ref 4.4–5.9)
WBC # BLD AUTO: 10.9 10E9/L (ref 4–11)

## 2018-10-30 PROCEDURE — 99213 OFFICE O/P EST LOW 20 MIN: CPT | Performed by: DERMATOLOGY

## 2018-10-30 PROCEDURE — 36415 COLL VENOUS BLD VENIPUNCTURE: CPT | Performed by: DERMATOLOGY

## 2018-10-30 PROCEDURE — 85025 COMPLETE CBC W/AUTO DIFF WBC: CPT | Performed by: DERMATOLOGY

## 2018-10-30 PROCEDURE — 84460 ALANINE AMINO (ALT) (SGPT): CPT | Performed by: DERMATOLOGY

## 2018-10-30 PROCEDURE — 84450 TRANSFERASE (AST) (SGOT): CPT | Performed by: DERMATOLOGY

## 2018-10-30 ASSESSMENT — PAIN SCALES - GENERAL: PAINLEVEL: NO PAIN (0)

## 2018-10-30 NOTE — PROGRESS NOTES
[facility] Dermatology Note      Dermatology Problem List:  1.Plaque psoriasis: 1-3% TBSA  -Current Tx: Humira  Past tx: Soriatane 25 mg daily stopped due to pyogenic granulomas 6/2018,  triamcinolone 0.1% ointment, desonide 0.05% ointment, calcipotriene 0.005% ointment  2. Onycholysis of distal right toenails, May be in association with psoriasis.    Encounter Date: Oct 30, 2018    CC:  Chief Complaint   Patient presents with     Psoriasis     patient on Humira and multiple creams         History of Present Illness:  Mr. To White is a 48 year old male who presents as a follow up for psoriasis. The patient was last seen 6/29/18 when he started Humira. Initially, this was working very well for the patient. However, he recently had a flare up which he thinks might be tied to a change in the water softener salt. The pt is unsure if he should continue with hiss current psoriasis treatment plan or try something else. He still uses the regimen of topicals along with the Humira.        Past Medical History:   Patient Active Problem List   Diagnosis     Other psoriasis     Obesity     Essential hypertension with goal blood pressure less than 140/90     Past Medical History:   Diagnosis Date     Essential hypertension, benign 2000     Hypertension      Obesity      Other psoriasis     since childhood     Past Surgical History:   Procedure Laterality Date     CHOLECYSTECTOMY       LAPAROSCOPIC CHOLECYSTECTOMY  7/3/2012    Procedure: LAPAROSCOPIC CHOLECYSTECTOMY;  Laparoscopic Cholecystectomy;  Surgeon: Kulwinder Hollins MD;  Location:  OR     NO HISTORY OF SURGERY         Social History:  The patient works as an .   Kept in chart for convenience.       Family History:  There is no family history of skin cancer.  Kept in chart for convenience.       Medications:  Current Outpatient Prescriptions   Medication Sig Dispense Refill     adalimumab (HUMIRA) 40 MG/0.8ML prefilled syringe kit  Iinject 80 mg (2 syringes) day 0, 40 mg day 7, then 40 mg every other week. 6 Syringe 4     amLODIPine (NORVASC) 10 MG tablet Take 1 tablet (10 mg) by mouth daily 90 tablet 3     benazepril (LOTENSIN) 40 MG tablet TAKE 1 TABLET BY MOUTH  DAILY 90 tablet 3     calcipotriene (DOVONOX) 0.005 % cream Apply topically 2 times daily 120 g 1     calcipotriene 0.005 % OINT Apply to the affected area twice a day Mon-Fri when rash is relatively calm. 240 g 3     clobetasol (TEMOVATE) 0.05 % external solution Apply on scalp for up to 2 weeks in a row for flares 50 mL 3     clobetasol (TEMOVATE) 0.05 % ointment Apply twice daily to the nails for 10 days 30 g 0     desonide (DESOWEN) 0.05 % ointment Apply to the affected area of the skin of groin or face daily for 2-3 week bursts as needed. 60 g 6     Fexofenadine HCl (ALLEGRA PO) Reported on 5/19/2017       fluocinonide (LIDEX) 0.05 % cream Apply topically 2 times daily 120 g 1     hydrOXYzine (ATARAX) 25 MG tablet Take 1-2 tablets (25-50 mg) by mouth every 6 hours as needed for itching 60 tablet 1     Pseudoephedrine-Ibuprofen (ADVIL COLD/SINUS PO) Reported on 5/19/2017       triamcinolone (KENALOG) 0.1 % ointment Apply to affected areas of the trunk twice a day when flaring, M-F when improved, weekend only when relatively calm. 454 g 6     acitretin (SORIATANE) 25 MG CAPS capsule Take 2 capsules (50 mg) by mouth daily (Patient not taking: Reported on 6/29/2018) 60 capsule 4     mupirocin (BACTROBAN) 2 % ointment Apply 3 times daily to the nails for 10 days. (Patient not taking: Reported on 10/30/2018) 30 g 0       Allergies   Allergen Reactions     No Known Allergies        Review of Systems:  -Skin: As per HPI.   -Constitutional: The patient is doing generally well.     Physical exam:  Vitals: There were no vitals taken for this visit.  GEN: This is a well developed, well-nourished male in no acute distress, in a pleasant mood.    SKIN: Focused examination of the hands, back,  and thighs was performed.  - There are erythematous well demarcated plaques with faint silvery micaceous scale on the back, chest, right arm, buttocks, lower legs.   -No other lesions of concern on areas examined.       Impression/Plan:  1. Plaque psoriasis with post-inflammatory hyperpigmentation. Improved on soriatane 25mg daily but we need to stop due to pyogenic granulomas.   Hold Soriatane . Continue bactroban and topical steroid as per scripts.  Side effects of treatment with biologic therapy were reviewed including but not limited to immune suppression, increased susceptibility to infection, injection site and systemic reactions, and possible increased risk of lymphoma and other malignancy. The patient denies history of hepatitis, personal or first degree relative with demyelinating disease, history of heart failure, history of malignancy, hematologic disorders, other immune compromising medications/disorders, tuberculosis or BCG vaccination. Discussed need to contact clinic if patient is ill or not feeling well as we may hold medication. Patient is up to date on vaccinations. Discussed that live vaccinations should not be given while on this medication and clinic should be contact prior to vaccinations.   Has had shingles and pneumonia.    Baseline labs were obtained at pt's last visit and were normal. Reviewed  Start Stelaraas per script, at next scheduled dose of humira. Cbc alt and ast on the way out today  2. Pyogenic granulomas, bilateral digits. Patient has stopped soriatane on account of this.REsolved  Follow-up in 3 months      Staff Involved:  Scribe/Staff    Scribe Disclosure  I, Barrett Powell, am serving as a scribe to document services personally performed by Dr. Jacey Cervantes MD, based on data collection and the provider's statements to me.     Provider Disclosure:   The documentation recorded by the scribe accurately reflects the services I personally performed and the decisions made by  me.    Jacey Cervantes MD    Department of Dermatology  [facility]: Phone: [phone], Fax:[phone]  [facility]: Phone: [phone], Fax: [phone]

## 2018-10-30 NOTE — MR AVS SNAPSHOT
After Visit Summary   10/30/2018    To White    MRN: [id number]           Patient Information     Date Of Birth          1970        Visit Information        Provider Department      10/30/2018 4:30 PM Jacey Cervantes MD [facility]        Today's Diagnoses     Psoriasis    -  1       Follow-ups after your visit        Follow-up notes from your care team     Return in about 3 months (around 1/30/2019) for psoraisis.      Your next 10 appointments already scheduled     Feb 15, 2019  4:00 PM CST   Return Visit with Jacey Cervantes MD   [facility] ([facility])    [address] [address] [place] [id number] [phone]              Who to contact     If you have questions or need follow up information about today's clinic visit or your schedule please contact [facility] directly at [phone].  Normal or non-critical lab and imaging results will be communicated to you by MyChart, letter or phone within 4 business days after the clinic has received the results. If you do not hear from us within 7 days, please contact the clinic through [facility] or phone. If you have a critical or abnormal lab result, we will notify you by phone as soon as possible.  Submit refill requests through [facility] or call your pharmacy and they will forward the refill request to us. Please allow 3 business days for your refill to be completed.          Additional Information About Your Visit        MyChart Information     [facility] is an electronic gateway that provides easy, online access to your medical records. With [facility], you can request a clinic appointment, read your test results, renew a prescription or communicate with your care team.     To sign up for [facility] visit the website at [email or web address]   You will be asked to enter the access code listed below, as well as some personal information. Please follow the directions to create  your username and password.     Your access code is: [email or web address]  Expires: 2019  4:58 PM     Your access code will  in 90 days. If you need help or a new code, please contact your [facility] or call [phone] for assistance.        Care EveryWhere ID     This is your Care EveryWhere ID. This could be used by other organizations to access your [place] medical records  [id number]         Blood Pressure from Last 3 Encounters:   18 116/76   17 126/74   05/10/17 113/80    Weight from Last 3 Encounters:   18 117.5 kg (259 lb)   17 118.4 kg (261 lb)   17 116.1 kg (256 lb)              We Performed the Following     ALT     AST     CBC with platelets differential          Today's Medication Changes          These changes are accurate as of 10/30/18  5:01 PM.  If you have any questions, ask your nurse or doctor.               Start taking these medicines.        Dose/Directions    ustekinumab 90 MG/ML   Commonly known as:  STELARA   Used for:  Psoriasis   Started by:  Jacey Cervantes MD        Inject 90 mg at 0 and 4 weeks, and then every 12 weeks thereafter. DO not take until you receive call from dermatology office   Quantity:  4 mL   Refills:  3         Stop taking these medicines if you haven't already. Please contact your care team if you have questions.     acitretin 25 MG Caps capsule   Commonly known as:  SORIATANE   Stopped by:  Jacey Cervantes MD           adalimumab 40 MG/0.8ML prefilled syringe kit   Commonly known as:  HUMIRA   Stopped by:  Jacey Cervantes MD                Where to get your medicines      Some of these will need a paper prescription and others can be bought over the counter.  Ask your nurse if you have questions.     You don't need a prescription for these medications     ustekinumab 90 MG/ML                Primary Care Provider Fax #    Physician No Ref-Primary [phone]       No address on file        Equal Access to  Services     [facility]: Hadii aad ku haddavidino Gaviotabrandon, waaxda luqadaha, qaybta kaalmabobbi mazariegos, beni alonzo. So [facility] [phone].    ATENCIÓN: Si benniela astrid, tiene a watkins disposición servicios gratuitos de asistencia lingüística. Llame al [phone].    We comply with applicable federal civil rights laws and [place] laws. We do not discriminate on the basis of race, color, national origin, age, disability, sex, sexual orientation, or gender identity.            Thank you!     Thank you for choosing [facility]  for your care. Our goal is always to provide you with excellent care. Hearing back from our patients is one way we can continue to improve our services. Please take a few minutes to complete the written survey that you may receive in the mail after your visit with us. Thank you!             Your Updated Medication List - Protect others around you: Learn how to safely use, store and throw away your medicines at www.disposemymeds.org.          This list is accurate as of 10/30/18  5:01 PM.  Always use your most recent med list.                   Brand Name Dispense Instructions for use Diagnosis    ADVIL COLD/SINUS PO      Reported on 5/19/2017        ALLEGRA PO      Reported on 5/19/2017        amLODIPine 10 MG tablet    NORVASC    90 tablet    Take 1 tablet (10 mg) by mouth daily    Essential hypertension with goal blood pressure less than 140/90       benazepril 40 MG tablet    LOTENSIN    90 tablet    TAKE 1 TABLET BY MOUTH  DAILY    Essential hypertension with goal blood pressure less than 140/90       * calcipotriene 0.005 % cream    DOVONOX    120 g    Apply topically 2 times daily    Other psoriasis       * calcipotriene 0.005 % Oint     240 g    Apply to the affected area twice a day Mon-Fri when rash is relatively calm.    Plaque psoriasis       * clobetasol 0.05 % external solution    TEMOVATE    50 mL    Apply on scalp for up to 2 weeks in  a row for flares    Psoriasis       * clobetasol 0.05 % ointment    TEMOVATE    30 g    Apply twice daily to the nails for 10 days    Pyogenic granuloma       desonide 0.05 % ointment    DESOWEN    60 g    Apply to the affected area of the skin of groin or face daily for 2-3 week bursts as needed.    Plaque psoriasis       fluocinonide 0.05 % cream    LIDEX    120 g    Apply topically 2 times daily    Other psoriasis       hydrOXYzine 25 MG tablet    ATARAX    60 tablet    Take 1-2 tablets (25-50 mg) by mouth every 6 hours as needed for itching    Itching       mupirocin 2 % ointment    BACTROBAN    30 g    Apply 3 times daily to the nails for 10 days.    Pyogenic granuloma       triamcinolone 0.1 % ointment    KENALOG    454 g    Apply to affected areas of the trunk twice a day when flaring, M-F when improved, weekend only when relatively calm.    Plaque psoriasis       ustekinumab 90 MG/ML    STELARA    4 mL    Inject 90 mg at 0 and 4 weeks, and then every 12 weeks thereafter. DO not take until you receive call from dermatology office    Psoriasis       * Notice:  This list has 4 medication(s) that are the same as other medications prescribed for you. Read the directions carefully, and ask your doctor or other care provider to review them with you.

## 2018-10-31 DIAGNOSIS — L40.8 OTHER PSORIASIS: Primary | ICD-10-CM

## 2018-10-31 DIAGNOSIS — Z51.81 MEDICATION MONITORING ENCOUNTER: ICD-10-CM

## 2018-11-05 ENCOUNTER — TELEPHONE (OUTPATIENT)
Dept: DERMATOLOGY | Facility: CLINIC | Age: 48
End: 2018-11-05

## 2018-11-05 DIAGNOSIS — L40.9 PSORIASIS: ICD-10-CM

## 2018-11-05 NOTE — TELEPHONE ENCOUNTER
[facility] Call Center    Phone Message    May a detailed message be left on voicemail: no    Reason for Call: Other: Due to insurance reasons. [facility] is calling to get Stelara refilled there.  Please call.      Action Taken: Message routed to:  Adult Clinics: Dermatology p [phone]

## 2018-11-05 NOTE — TELEPHONE ENCOUNTER
PA Initiation    Medication: Stelara 90MG/ML Pen (PENDING)  Insurance Company: [employer] ([facility]) - Phone [phone] Fax [phone]  Pharmacy Filling the Rx: [facility] - [place], [place] - [facility] [address]  Filling Pharmacy Phone: [phone]  Filling Pharmacy Fax: [phone]  Start Date: 11/5/2018

## 2018-11-07 NOTE — TELEPHONE ENCOUNTER
Prior Authorization Approval    Authorization Effective Date: 11/5/2018  Authorization Expiration Date: 11/5/2019  Medication: Stelara 90MG/ML Pen (APPROVED)  Approved Dose/Quantity: 1      Reference #: CMM KEY: [id number]   Insurance Company: [employer] ([facility]) - Phone [phone] Fax [phone]  Expected CoPay: ?      CoPay Card Available:      Foundation Assistance Needed:    Which Pharmacy is filling the prescription (Not needed for infusion/clinic administered): [facility] - [place], [place] - [facility] [address]  Pharmacy Notified: Yes  Patient Notified: Yes

## 2018-12-07 DIAGNOSIS — I10 ESSENTIAL HYPERTENSION WITH GOAL BLOOD PRESSURE LESS THAN 140/90: ICD-10-CM

## 2018-12-10 RX ORDER — BENAZEPRIL HYDROCHLORIDE 40 MG/1
TABLET ORAL
Qty: 30 TABLET | Refills: 0 | Status: SHIPPED | OUTPATIENT
Start: 2018-12-10 | End: 2018-12-26

## 2018-12-10 NOTE — TELEPHONE ENCOUNTER
"Requested Prescriptions   Pending Prescriptions Disp Refills     benazepril (LOTENSIN) 40 MG tablet [Pharmacy Med Name: BENAZEPRIL  40MG  TAB] 90 tablet      Sig: TAKE 1 TABLET BY MOUTH  DAILY    ACE Inhibitors (Including Combos) Protocol Passed - 12/7/2018  8:08 PM       Passed - Blood pressure under 140/90 in past 12 months    BP Readings from Last 3 Encounters:   01/19/18 116/76   05/19/17 126/74   05/10/17 113/80          Passed - Recent (12 mo) or future (30 days) visit within the authorizing provider's specialty    Patient had office visit in the last 12 months or has a visit in the next 30 days with authorizing provider or within the authorizing provider's specialty.  See \"Patient Info\" tab in inbasket, or \"Choose Columns\" in Meds & Orders section of the refill encounter.         Passed - Patient is age 18 or older       Passed - Normal serum creatinine on file in past 12 months    Recent Labs   Lab Test 06/29/18  1654   CR 1.12          Passed - Normal serum potassium on file in past 12 months    Recent Labs   Lab Test 06/29/18  1654   POTASSIUM 3.6           benazepril (LOTENSIN) 40 MG tablet  Medication is being filled for 1 time refill only due to:  Patient needs to be seen because due for physical 01/19/2019 or after.   Please assist with scheduling.    Floresita Brown, RN, BSN           "

## 2018-12-10 NOTE — TELEPHONE ENCOUNTER
Spoke with patient, he will call back to schedule   Closing encounter  Barbara Ochoa RT (R)

## 2018-12-21 DIAGNOSIS — L40.8 OTHER PSORIASIS: ICD-10-CM

## 2018-12-21 DIAGNOSIS — Z51.81 MEDICATION MONITORING ENCOUNTER: ICD-10-CM

## 2018-12-21 LAB
ALT SERPL W P-5'-P-CCNC: 19 U/L (ref 0–70)
AST SERPL W P-5'-P-CCNC: 13 U/L (ref 0–45)
BASOPHILS # BLD AUTO: 0.1 10E9/L (ref 0–0.2)
BASOPHILS NFR BLD AUTO: 1.1 %
DIFFERENTIAL METHOD BLD: NORMAL
EOSINOPHIL # BLD AUTO: 0.6 10E9/L (ref 0–0.7)
EOSINOPHIL NFR BLD AUTO: 6.4 %
ERYTHROCYTE [DISTWIDTH] IN BLOOD BY AUTOMATED COUNT: 13.4 % (ref 10–15)
HCT VFR BLD AUTO: 42.7 % (ref 40–53)
HGB BLD-MCNC: 14.2 G/DL (ref 13.3–17.7)
LYMPHOCYTES # BLD AUTO: 3.6 10E9/L (ref 0.8–5.3)
LYMPHOCYTES NFR BLD AUTO: 38.2 %
MCH RBC QN AUTO: 30.4 PG (ref 26.5–33)
MCHC RBC AUTO-ENTMCNC: 33.3 G/DL (ref 31.5–36.5)
MCV RBC AUTO: 91 FL (ref 78–100)
MONOCYTES # BLD AUTO: 0.8 10E9/L (ref 0–1.3)
MONOCYTES NFR BLD AUTO: 8.8 %
NEUTROPHILS # BLD AUTO: 4.3 10E9/L (ref 1.6–8.3)
NEUTROPHILS NFR BLD AUTO: 45.5 %
PLATELET # BLD AUTO: 357 10E9/L (ref 150–450)
RBC # BLD AUTO: 4.67 10E12/L (ref 4.4–5.9)
WBC # BLD AUTO: 9.5 10E9/L (ref 4–11)

## 2018-12-21 PROCEDURE — 84450 TRANSFERASE (AST) (SGOT): CPT | Performed by: DERMATOLOGY

## 2018-12-21 PROCEDURE — 85025 COMPLETE CBC W/AUTO DIFF WBC: CPT | Performed by: DERMATOLOGY

## 2018-12-21 PROCEDURE — 84460 ALANINE AMINO (ALT) (SGPT): CPT | Performed by: DERMATOLOGY

## 2018-12-21 PROCEDURE — 36415 COLL VENOUS BLD VENIPUNCTURE: CPT | Performed by: DERMATOLOGY

## 2018-12-21 NOTE — PROGRESS NOTES
SUBJECTIVE:   To White is a 48 year old male who presents to clinic today for the following health issues:      History of Present Illness     Hypertension:     Outpatient blood pressures:  Are not being checked    Dietary sodium intake::  Not monitoring salt intake    Diet:  Regular (no restrictions)  Frequency of exercise:  1 day/week  Duration of exercise:  15-30 minutes  Taking medications regularly:  Yes  Medication side effects:  None  Additional concerns today:  No    Patient presents today for HTN recheck. This has been well controlled. Patient denies headache, vision changes, chest pain, shortness of breath or paresthesia. He does not routinely monitor diet. He does very little exercise. Labs frequently checked with dermatologist. Had complete lipid panel done at work - will bring a copy into clinic.     Problem list and histories reviewed & adjusted, as indicated.  Additional history: as documented    Patient Active Problem List   Diagnosis     Other psoriasis     Obesity     Essential hypertension with goal blood pressure less than 140/90     Obesity (BMI 35.0-39.9) with comorbidity (H)     Past Surgical History:   Procedure Laterality Date     CHOLECYSTECTOMY       LAPAROSCOPIC CHOLECYSTECTOMY  7/3/2012    Procedure: LAPAROSCOPIC CHOLECYSTECTOMY;  Laparoscopic Cholecystectomy;  Surgeon: Kulwinder Hollins MD;  Location: [facility] OR     NO HISTORY OF SURGERY         Social History     Tobacco Use     Smoking status: Never Smoker     Smokeless tobacco: Never Used   Substance Use Topics     Alcohol use: Yes     Comment: Rarely     Family History   Problem Relation Age of Onset     Hypertension Father      Cerebrovascular Disease Maternal Grandmother      Cancer - colorectal Maternal Uncle         dx age 45,  before age 50     Gastrointestinal Disease Mother         benign colon polyps age over 50     Diabetes No family hx of      Prostate Cancer No family hx of          Current Outpatient Medications    Medication Sig Dispense Refill     amLODIPine (NORVASC) 10 MG tablet Take 1 tablet (10 mg) by mouth daily 90 tablet 3     benazepril (LOTENSIN) 40 MG tablet Take 1 tablet (40 mg) by mouth daily 90 tablet 3     calcipotriene (DOVONOX) 0.005 % cream Apply topically 2 times daily 120 g 1     calcipotriene 0.005 % OINT Apply to the affected area twice a day Mon-Fri when rash is relatively calm. 240 g 3     clobetasol (TEMOVATE) 0.05 % external solution Apply on scalp for up to 2 weeks in a row for flares 50 mL 3     clobetasol (TEMOVATE) 0.05 % ointment Apply twice daily to the nails for 10 days 30 g 0     desonide (DESOWEN) 0.05 % ointment Apply to the affected area of the skin of groin or face daily for 2-3 week bursts as needed. 60 g 6     Fexofenadine HCl (ALLEGRA PO) Reported on 5/19/2017       fluocinonide (LIDEX) 0.05 % cream Apply topically 2 times daily 120 g 1     hydrOXYzine (ATARAX) 25 MG tablet Take 1-2 tablets (25-50 mg) by mouth every 6 hours as needed for itching 60 tablet 1     mupirocin (BACTROBAN) 2 % ointment Apply 3 times daily to the nails for 10 days. 30 g 0     Pseudoephedrine-Ibuprofen (ADVIL COLD/SINUS PO) Reported on 5/19/2017       triamcinolone (KENALOG) 0.1 % ointment Apply to affected areas of the trunk twice a day when flaring, M-F when improved, weekend only when relatively calm. 454 g 6     ustekinumab (STELARA) 90 MG/ML Inject 90 mg at 0 and 4 weeks, and then every 12 weeks thereafter. DO not take until you receive call from dermatology office 4 mL 3     Allergies   Allergen Reactions     No Known Allergies      BP Readings from Last 3 Encounters:   12/26/18 112/72   01/19/18 116/76   05/19/17 126/74    Wt Readings from Last 3 Encounters:   12/26/18 125.6 kg (277 lb)   01/19/18 117.5 kg (259 lb)   05/19/17 118.4 kg (261 lb)         ROS:  Constitutional, HEENT, cardiovascular, pulmonary, gi and gu systems are negative, except as otherwise noted.    OBJECTIVE:     /72   Pulse  100   Temp 98.2  F (36.8  C) (Temporal)   Resp 20   Wt 125.6 kg (277 lb)   SpO2 98%   BMI 35.74 kg/m    Body mass index is 35.74 kg/m .  GENERAL: healthy, alert and no distress  EYES: Eyes grossly normal to inspection, PERRL and conjunctivae and sclerae normal  NECK: no adenopathy, no asymmetry, masses, or scars and thyroid normal to palpation  RESP: lungs clear to auscultation - no rales, rhonchi or wheezes  CV: regular rate and rhythm, normal S1 S2, no S3 or S4, no murmur, click or rub, no peripheral edema and peripheral pulses strong  ABDOMEN: soft, nontender, no hepatosplenomegaly, no masses and bowel sounds normal  PSYCH: mentation appears normal, affect normal/bright    Diagnostic Test Results:  none     ASSESSMENT/PLAN:     1. Essential hypertension with goal blood pressure less than 140/90  Blood pressure well controlled. CMP recently checked with dermatologist (see labs tab in Epic). Patient also reports he recently had cholesterol levels done at work. Will bring copy into clinic.   - amLODIPine (NORVASC) 10 MG tablet; Take 1 tablet (10 mg) by mouth daily  Dispense: 90 tablet; Refill: 3  - benazepril (LOTENSIN) 40 MG tablet; Take 1 tablet (40 mg) by mouth daily  Dispense: 90 tablet; Refill: 3    2. Morbid obesity (H)  Weight addressed today. Patient does acknowledge he needs to lose weight. Encouraged ongoing diet and exercise modifications for health weight and lifestyle. Further assistance with dietician, etc. Declined by patient today.     3. Need for shingles vaccine  Recommended early by dermatologist. Due for second injection today.   - ZOSTER VACCINE RECOMBINANT ADJUVANTED IM NJX    Follow-up annually, sooner if needed.     The patient indicates understanding of these issues and agrees with the plan.    Deidre Peralta PA-C  [facility]

## 2018-12-26 ENCOUNTER — OFFICE VISIT (OUTPATIENT)
Dept: FAMILY MEDICINE | Facility: OTHER | Age: 48
End: 2018-12-26
Payer: COMMERCIAL

## 2018-12-26 VITALS
DIASTOLIC BLOOD PRESSURE: 72 MMHG | RESPIRATION RATE: 20 BRPM | SYSTOLIC BLOOD PRESSURE: 112 MMHG | HEART RATE: 100 BPM | TEMPERATURE: 98.2 F | WEIGHT: 277 LBS | BODY MASS INDEX: 35.74 KG/M2 | OXYGEN SATURATION: 98 %

## 2018-12-26 DIAGNOSIS — I10 ESSENTIAL HYPERTENSION WITH GOAL BLOOD PRESSURE LESS THAN 140/90: Primary | ICD-10-CM

## 2018-12-26 DIAGNOSIS — Z23 NEED FOR SHINGLES VACCINE: ICD-10-CM

## 2018-12-26 DIAGNOSIS — E66.01 MORBID OBESITY (H): ICD-10-CM

## 2018-12-26 PROCEDURE — 99213 OFFICE O/P EST LOW 20 MIN: CPT | Mod: 25 | Performed by: PHYSICIAN ASSISTANT

## 2018-12-26 PROCEDURE — 90471 IMMUNIZATION ADMIN: CPT | Performed by: PHYSICIAN ASSISTANT

## 2018-12-26 PROCEDURE — 90750 HZV VACC RECOMBINANT IM: CPT | Performed by: PHYSICIAN ASSISTANT

## 2018-12-26 RX ORDER — AMLODIPINE BESYLATE 10 MG/1
10 TABLET ORAL DAILY
Qty: 90 TABLET | Refills: 3 | Status: SHIPPED | OUTPATIENT
Start: 2018-12-26 | End: 2020-01-14

## 2018-12-26 RX ORDER — BENAZEPRIL HYDROCHLORIDE 40 MG/1
40 TABLET ORAL DAILY
Qty: 90 TABLET | Refills: 3 | Status: SHIPPED | OUTPATIENT
Start: 2018-12-26 | End: 2020-01-14

## 2018-12-26 ASSESSMENT — PAIN SCALES - GENERAL: PAINLEVEL: NO PAIN (0)

## 2018-12-26 NOTE — PROGRESS NOTES
Prior to injection, verified patient identity using patient's name and date of birth.  Due to injection administration, patient instructed to remain in clinic for 15 minutes  afterwards, and to report any adverse reaction to me immediately.        Screening Questionnaire for Adult Immunization    Are you sick today?   No   Do you have allergies to medications, food, a vaccine component or latex?   No   Have you ever had a serious reaction after receiving a vaccination?   No   Do you have a long-term health problem with heart disease, lung disease, asthma, kidney disease, metabolic disease (e.g. diabetes), anemia, or other blood disorder?   No   Do you have cancer, leukemia, HIV/AIDS, or any other immune system problem?   No   In the past 3 months, have you taken medications that affect  your immune system, such as prednisone, other steroids, or anticancer drugs; drugs for the treatment of rheumatoid arthritis, Crohn s disease, or psoriasis; or have you had radiation treatments?   No   Have you had a seizure, or a brain or other nervous system problem?   No   During the past year, have you received a transfusion of blood or blood     products, or been given immune (gamma) globulin or antiviral drug?   No   For women: Are you pregnant or is there a chance you could become        pregnant during the next month?   No   Have you received any vaccinations in the past 4 weeks? no     Immunization questionnaire answers were all negative.             Screening performed by Yang Martinez on 12/26/2018 at 8:36 AM.

## 2019-02-15 ENCOUNTER — OFFICE VISIT (OUTPATIENT)
Dept: DERMATOLOGY | Facility: CLINIC | Age: 49
End: 2019-02-15
Payer: COMMERCIAL

## 2019-02-15 DIAGNOSIS — L40.0 PLAQUE PSORIASIS: ICD-10-CM

## 2019-02-15 DIAGNOSIS — L29.9 ITCHING: ICD-10-CM

## 2019-02-15 DIAGNOSIS — L98.0 PYOGENIC GRANULOMA: ICD-10-CM

## 2019-02-15 DIAGNOSIS — L40.9 PSORIASIS: ICD-10-CM

## 2019-02-15 PROCEDURE — 99213 OFFICE O/P EST LOW 20 MIN: CPT | Performed by: DERMATOLOGY

## 2019-02-15 RX ORDER — CLOBETASOL PROPIONATE 0.5 MG/G
OINTMENT TOPICAL
Qty: 60 G | Refills: 4 | Status: SHIPPED | OUTPATIENT
Start: 2019-02-15 | End: 2020-09-10

## 2019-02-15 RX ORDER — HYDROXYZINE HYDROCHLORIDE 25 MG/1
25-50 TABLET, FILM COATED ORAL EVERY 6 HOURS PRN
Qty: 180 TABLET | Refills: 1 | Status: SHIPPED | OUTPATIENT
Start: 2019-02-15 | End: 2019-12-12

## 2019-02-15 RX ORDER — TRIAMCINOLONE ACETONIDE 1 MG/G
OINTMENT TOPICAL
Qty: 454 G | Refills: 6 | Status: SHIPPED | OUTPATIENT
Start: 2019-02-15 | End: 2020-09-10

## 2019-02-15 NOTE — LETTER
2/15/2019         RE: To White  [address] [address] [place] [id number]        Dear Colleague,    Thank you for referring your patient, To White, to the [facility]. Please see a copy of my visit note below.    [facility] Dermatology Note      Dermatology Problem List:  1.Plaque psoriasis: 1-3% TBSA  -Current Tx: Humira  Past tx: Soriatane 25 mg daily stopped due to pyogenic granulomas 6/2018,  triamcinolone 0.1% ointment, desonide 0.05% ointment, calcipotriene 0.005% ointment  2. Onycholysis of distal right toenails, May be in association with psoriasis.    Encounter Date: Feb 15, 2019    CC:  Chief Complaint   Patient presents with     RECHECK     stelara medication psorasis back is not better          History of Present Illness:  Mr. To White is a 48 year old male who presents as a follow up for psoriasis. The patient was last seen 10/30/18 when he started Stelara. Today the pt reports that psoriasis is roughly stable since last time. His arms are essentially clear, although his arms occasionally itch. His back is still very pruritic. Addition of topicals to the back did not seem to help, he says.     Past Medical History:   Patient Active Problem List   Diagnosis     Other psoriasis     Obesity     Essential hypertension with goal blood pressure less than 140/90     Obesity (BMI 35.0-39.9) with comorbidity (H)     Past Medical History:   Diagnosis Date     Essential hypertension, benign 2000     Hypertension      Obesity      Other psoriasis     since childhood     Past Surgical History:   Procedure Laterality Date     CHOLECYSTECTOMY       LAPAROSCOPIC CHOLECYSTECTOMY  7/3/2012    Procedure: LAPAROSCOPIC CHOLECYSTECTOMY;  Laparoscopic Cholecystectomy;  Surgeon: Kulwinder Hollins MD;  Location:  OR     NO HISTORY OF SURGERY         Social History:  The patient works as an .   Kept in chart for convenience.       Family History:  There is no  family history of skin cancer.  Kept in chart for convenience.       Medications:  Current Outpatient Medications   Medication Sig Dispense Refill     amLODIPine (NORVASC) 10 MG tablet Take 1 tablet (10 mg) by mouth daily 90 tablet 3     benazepril (LOTENSIN) 40 MG tablet Take 1 tablet (40 mg) by mouth daily 90 tablet 3     calcipotriene (DOVONOX) 0.005 % cream Apply topically 2 times daily 120 g 1     calcipotriene 0.005 % OINT Apply to the affected area twice a day Mon-Fri when rash is relatively calm. 240 g 3     clobetasol (TEMOVATE) 0.05 % external solution Apply on scalp for up to 2 weeks in a row for flares 50 mL 3     clobetasol (TEMOVATE) 0.05 % ointment Apply twice daily to the nails for 10 days 30 g 0     desonide (DESOWEN) 0.05 % ointment Apply to the affected area of the skin of groin or face daily for 2-3 week bursts as needed. 60 g 6     Fexofenadine HCl (ALLEGRA PO) Reported on 5/19/2017       fluocinonide (LIDEX) 0.05 % cream Apply topically 2 times daily 120 g 1     hydrOXYzine (ATARAX) 25 MG tablet Take 1-2 tablets (25-50 mg) by mouth every 6 hours as needed for itching 60 tablet 1     mupirocin (BACTROBAN) 2 % ointment Apply 3 times daily to the nails for 10 days. 30 g 0     Pseudoephedrine-Ibuprofen (ADVIL COLD/SINUS PO) Reported on 5/19/2017       triamcinolone (KENALOG) 0.1 % ointment Apply to affected areas of the trunk twice a day when flaring, M-F when improved, weekend only when relatively calm. 454 g 6     ustekinumab (STELARA) 90 MG/ML Inject 90 mg at 0 and 4 weeks, and then every 12 weeks thereafter. DO not take until you receive call from dermatology office 4 mL 3       Allergies   Allergen Reactions     No Known Allergies        Review of Systems:  -Skin: As per HPI.   -Constitutional: The patient is doing generally well.   No low back stiffness, possibly knee stiffness  Physical exam:  Vitals: There were no vitals taken for this visit.  GEN: This is a well developed, well-nourished  male in no acute distress, in a pleasant mood.    SKIN: Focused examination of the hands, back, and thighs was performed.  - Scaly patch on the back, left groin  and upper back  -No other lesions of concern on areas examined.       Impression/Plan:  1. Plaque psoriasis, drmatitic improvement with near clearance on Stelara, labs good, still itchy despite nearly clear body, will restart atarax, declines phototherapy  Continue . Hold drug if sick. Pt had flu/shingles vaccine.   Continue calcipotriene BID, Monday through Friday.  Continue triamcinolone BID on the weekends (or during week if flaring). Clobetasol for tough spots on back for up to 2 weeks.   Continue desonide 0.05% BID on the face and groin.     Follow-up in 6 months.       Staff Involved:  Scribe/Staff    Scribe Disclosure  I, Barrett Powell, am serving as a scribe to document services personally performed by Dr. Jacey Cervantes MD, based on data collection and the provider's statements to me.     Provider Disclosure:   The documentation recorded by the scribe accurately reflects the services I personally performed and the decisions made by me.    Jacey Cervantes MD    Department of Dermatology  [facility]: Phone: [phone], Fax:[phone]  [facility]: Phone: [phone], Fax: [phone]            Again, thank you for allowing me to participate in the care of your patient.        Sincerely,        Jacey Cervantes MD

## 2019-02-15 NOTE — NURSING NOTE
To White's goals for this visit include:   Chief Complaint   Patient presents with     RECHECK     stelara medication psorasis back is not better        He requests these members of his care team be copied on today's visit information: yes    PCP: No Ref-Primary, Physician    Referring Provider:  No referring provider defined for this encounter.    There were no vitals taken for this visit.    Do you need any medication refills at today's visit? No     Amorrae RENARD Parra

## 2019-02-15 NOTE — PROGRESS NOTES
[facility] Dermatology Note      Dermatology Problem List:  1.Plaque psoriasis: 1-3% TBSA  -Current Tx: Humira  Past tx: Soriatane 25 mg daily stopped due to pyogenic granulomas 6/2018,  triamcinolone 0.1% ointment, desonide 0.05% ointment, calcipotriene 0.005% ointment  2. Onycholysis of distal right toenails, May be in association with psoriasis.    Encounter Date: Feb 15, 2019    CC:  Chief Complaint   Patient presents with     RECHECK     stelara medication psorasis back is not better          History of Present Illness:  Mr. To White is a 48 year old male who presents as a follow up for psoriasis. The patient was last seen 10/30/18 when he started Stelara. Today the pt reports that psoriasis is roughly stable since last time. His arms are essentially clear, although his arms occasionally itch. His back is still very pruritic. Addition of topicals to the back did not seem to help, he says.     Past Medical History:   Patient Active Problem List   Diagnosis     Other psoriasis     Obesity     Essential hypertension with goal blood pressure less than 140/90     Obesity (BMI 35.0-39.9) with comorbidity (H)     Past Medical History:   Diagnosis Date     Essential hypertension, benign 2000     Hypertension      Obesity      Other psoriasis     since childhood     Past Surgical History:   Procedure Laterality Date     CHOLECYSTECTOMY       LAPAROSCOPIC CHOLECYSTECTOMY  7/3/2012    Procedure: LAPAROSCOPIC CHOLECYSTECTOMY;  Laparoscopic Cholecystectomy;  Surgeon: Kulwinder Hollins MD;  Location:  OR     NO HISTORY OF SURGERY         Social History:  The patient works as an .   Kept in chart for convenience.       Family History:  There is no family history of skin cancer.  Kept in chart for convenience.       Medications:  Current Outpatient Medications   Medication Sig Dispense Refill     amLODIPine (NORVASC) 10 MG tablet Take 1 tablet (10 mg) by mouth daily 90 tablet 3      benazepril (LOTENSIN) 40 MG tablet Take 1 tablet (40 mg) by mouth daily 90 tablet 3     calcipotriene (DOVONOX) 0.005 % cream Apply topically 2 times daily 120 g 1     calcipotriene 0.005 % OINT Apply to the affected area twice a day Mon-Fri when rash is relatively calm. 240 g 3     clobetasol (TEMOVATE) 0.05 % external solution Apply on scalp for up to 2 weeks in a row for flares 50 mL 3     clobetasol (TEMOVATE) 0.05 % ointment Apply twice daily to the nails for 10 days 30 g 0     desonide (DESOWEN) 0.05 % ointment Apply to the affected area of the skin of groin or face daily for 2-3 week bursts as needed. 60 g 6     Fexofenadine HCl (ALLEGRA PO) Reported on 5/19/2017       fluocinonide (LIDEX) 0.05 % cream Apply topically 2 times daily 120 g 1     hydrOXYzine (ATARAX) 25 MG tablet Take 1-2 tablets (25-50 mg) by mouth every 6 hours as needed for itching 60 tablet 1     mupirocin (BACTROBAN) 2 % ointment Apply 3 times daily to the nails for 10 days. 30 g 0     Pseudoephedrine-Ibuprofen (ADVIL COLD/SINUS PO) Reported on 5/19/2017       triamcinolone (KENALOG) 0.1 % ointment Apply to affected areas of the trunk twice a day when flaring, M-F when improved, weekend only when relatively calm. 454 g 6     ustekinumab (STELARA) 90 MG/ML Inject 90 mg at 0 and 4 weeks, and then every 12 weeks thereafter. DO not take until you receive call from dermatology office 4 mL 3       Allergies   Allergen Reactions     No Known Allergies        Review of Systems:  -Skin: As per HPI.   -Constitutional: The patient is doing generally well.   No low back stiffness, possibly knee stiffness  Physical exam:  Vitals: There were no vitals taken for this visit.  GEN: This is a well developed, well-nourished male in no acute distress, in a pleasant mood.    SKIN: Focused examination of the hands, back, and thighs was performed.  - Scaly patch on the back, left groin  and upper back  -No other lesions of concern on areas examined.        Impression/Plan:  1. Plaque psoriasis, drmatitic improvement with near clearance on Stelara, labs good, still itchy despite nearly clear body, will restart atarax, declines phototherapy  Continue . Hold drug if sick. Pt had flu/shingles vaccine.   Continue calcipotriene BID, Monday through Friday.  Continue triamcinolone BID on the weekends (or during week if flaring). Clobetasol for tough spots on back for up to 2 weeks.   Continue desonide 0.05% BID on the face and groin.     Follow-up in 6 months.       Staff Involved:  Scribe/Staff    Scribe Disclosure  I, Barrett Powell, am serving as a scribe to document services personally performed by Dr. Jacey Cervantes MD, based on data collection and the provider's statements to me.     Provider Disclosure:   The documentation recorded by the scribe accurately reflects the services I personally performed and the decisions made by me.    Jacey Cervantes MD    Department of Dermatology  [facility]: Phone: [phone], Fax:[phone]  [facility]: Phone: [phone], Fax: [phone]

## 2019-02-18 ENCOUNTER — TELEPHONE (OUTPATIENT)
Dept: DERMATOLOGY | Facility: CLINIC | Age: 49
End: 2019-02-18

## 2019-02-18 NOTE — TELEPHONE ENCOUNTER
[facility] Call Center    Phone Message    May a detailed message be left on voicemail: yes    Reason for Call: Medication Question or concern regarding medication   Prescription Clarification  Name of Medication: Stelara  Prescribing Provider: Dr. Cervantes   Pharmacy: [facility]    What on the order needs clarification? Pharmacy would like clarification on the medication.  Does patient need to restart this medication or is he on the maintenance plan right now.  Please call back to discuss.            Action Taken: Message routed to:  Adult Clinics: Dermatology p [phone]

## 2019-09-05 ENCOUNTER — OFFICE VISIT (OUTPATIENT)
Dept: DERMATOLOGY | Facility: CLINIC | Age: 49
End: 2019-09-05
Payer: COMMERCIAL

## 2019-09-05 ENCOUNTER — TELEPHONE (OUTPATIENT)
Dept: PHARMACY | Facility: CLINIC | Age: 49
End: 2019-09-05

## 2019-09-05 DIAGNOSIS — L40.9 PSORIASIS: ICD-10-CM

## 2019-09-05 DIAGNOSIS — L40.0 PLAQUE PSORIASIS: Primary | ICD-10-CM

## 2019-09-05 DIAGNOSIS — Z51.81 MEDICATION MONITORING ENCOUNTER: ICD-10-CM

## 2019-09-05 DIAGNOSIS — M25.50 ARTHRALGIA, UNSPECIFIED JOINT: ICD-10-CM

## 2019-09-05 LAB
ALT SERPL W P-5'-P-CCNC: 25 U/L (ref 0–70)
AST SERPL W P-5'-P-CCNC: 17 U/L (ref 0–45)
ERYTHROCYTE [DISTWIDTH] IN BLOOD BY AUTOMATED COUNT: 13.1 % (ref 10–15)
HCT VFR BLD AUTO: 44.3 % (ref 40–53)
HGB BLD-MCNC: 14.6 G/DL (ref 13.3–17.7)
MCH RBC QN AUTO: 30.2 PG (ref 26.5–33)
MCHC RBC AUTO-ENTMCNC: 33 G/DL (ref 31.5–36.5)
MCV RBC AUTO: 92 FL (ref 78–100)
PLATELET # BLD AUTO: 342 10E9/L (ref 150–450)
RBC # BLD AUTO: 4.83 10E12/L (ref 4.4–5.9)
WBC # BLD AUTO: 8.8 10E9/L (ref 4–11)

## 2019-09-05 PROCEDURE — 86481 TB AG RESPONSE T-CELL SUSP: CPT | Performed by: DERMATOLOGY

## 2019-09-05 PROCEDURE — 36415 COLL VENOUS BLD VENIPUNCTURE: CPT | Performed by: DERMATOLOGY

## 2019-09-05 PROCEDURE — 99213 OFFICE O/P EST LOW 20 MIN: CPT | Performed by: DERMATOLOGY

## 2019-09-05 PROCEDURE — 84450 TRANSFERASE (AST) (SGOT): CPT | Performed by: DERMATOLOGY

## 2019-09-05 PROCEDURE — 85027 COMPLETE CBC AUTOMATED: CPT | Performed by: DERMATOLOGY

## 2019-09-05 PROCEDURE — 84460 ALANINE AMINO (ALT) (SGPT): CPT | Performed by: DERMATOLOGY

## 2019-09-05 ASSESSMENT — PAIN SCALES - GENERAL: PAINLEVEL: NO PAIN (0)

## 2019-09-05 NOTE — PROGRESS NOTES
[facility] Dermatology Note      Dermatology Problem List:  1.Plaque psoriasis: 1-3% TBSA  -Current Tx: Stelara, triamcinolone 0.1% ointment, desonide 0.05% ointment, calcipotriene 0.005% ointment, Clobetasol  Past tx: Soriatane 25 mg daily stopped due to pyogenic granulomas 6/2018, Humira  2. Onycholysis of distal right toenails, May be in association with psoriasis.    Encounter Date: Sep 5, 2019    CC:  Chief Complaint   Patient presents with     RECHECK     plaque psoriasis         History of Present Illness:  Mr. To White is a 49 year old male who presents as a follow up for psoriasis. The patient was last seen 2/15/2019 when he showed dramatic improvement while on Stelara and was advised to continue taking it and was also re-started on Atarax 25 mg tablets. Today he reports most of his psoriasis has cleared, but he can still see the shading around areas of past flares. He cannot remember the last time he saw a plaque. Denies recent illnesses or hospitalizations. He plans to get his flu shot this fall. No other concerns.Has not needed his topicals. No changes in medical problems    Past Medical History:   Patient Active Problem List   Diagnosis     Other psoriasis     Obesity     Essential hypertension with goal blood pressure less than 140/90     Obesity (BMI 35.0-39.9) with comorbidity (H)     Past Medical History:   Diagnosis Date     Essential hypertension, benign 2000     Hypertension      Obesity      Other psoriasis     since childhood     Past Surgical History:   Procedure Laterality Date     CHOLECYSTECTOMY       LAPAROSCOPIC CHOLECYSTECTOMY  7/3/2012    Procedure: LAPAROSCOPIC CHOLECYSTECTOMY;  Laparoscopic Cholecystectomy;  Surgeon: Kulwinder Hollins MD;  Location:  OR     NO HISTORY OF SURGERY         Social History:  The patient works as an .   Kept in chart for convenience.       Family History:  There is no family history of skin cancer.  Kept in chart for  convenience.       Medications:  Current Outpatient Medications   Medication Sig Dispense Refill     amLODIPine (NORVASC) 10 MG tablet Take 1 tablet (10 mg) by mouth daily 90 tablet 3     calcipotriene (DOVONOX) 0.005 % cream Apply topically 2 times daily 120 g 1     Fexofenadine HCl (ALLEGRA PO) Reported on 5/19/2017       hydrOXYzine (ATARAX) 25 MG tablet Take 1-2 tablets (25-50 mg) by mouth every 6 hours as needed for itching 180 tablet 1     ustekinumab (STELARA) 90 MG/ML Inject 90 mg at 0 and 4 weeks, and then every 12 weeks thereafter. DO not take until you receive call from dermatology office 4 mL 1     benazepril (LOTENSIN) 40 MG tablet Take 1 tablet (40 mg) by mouth daily 90 tablet 3     calcipotriene 0.005 % OINT Apply to the affected area twice a day Mon-Fri when rash is relatively calm. (Patient not taking: Reported on 9/5/2019) 240 g 3     clobetasol (TEMOVATE) 0.05 % external ointment Apply twice daily for 2 weeks to back. (Patient not taking: Reported on 9/5/2019) 60 g 4     clobetasol (TEMOVATE) 0.05 % external solution Apply on scalp for up to 2 weeks in a row for flares (Patient not taking: Reported on 9/5/2019) 50 mL 3     desonide (DESOWEN) 0.05 % ointment Apply to the affected area of the skin of groin or face daily for 2-3 week bursts as needed. (Patient not taking: Reported on 9/5/2019) 60 g 6     fluocinonide (LIDEX) 0.05 % cream Apply topically 2 times daily 120 g 1     mupirocin (BACTROBAN) 2 % ointment Apply 3 times daily to the nails for 10 days. (Patient not taking: Reported on 9/5/2019) 30 g 0     Pseudoephedrine-Ibuprofen (ADVIL COLD/SINUS PO) Reported on 5/19/2017       triamcinolone (KENALOG) 0.1 % external ointment Apply to affected areas of the trunk twice a day when flaring, M-F when improved, weekend only when relatively calm. (Patient not taking: Reported on 9/5/2019) 454 g 6       Allergies   Allergen Reactions     No Known Allergies        Review of Systems:  -Skin: As per  HPI.   -Constitutional: The patient is doing generally well.   -rheum- has had shoulder pain and stiffness  Physical exam:  Vitals: There were no vitals taken for this visit.  GEN: This is a well developed, well-nourished male in no acute distress, in a pleasant mood.    SKIN: Focused examination of the hands, back, abdomen, and arms was performed.  - Few pink papules scattered on back  - Genitals are clear  - Mild macular erythema on lower abdomen  - Nails clear  -No other lesions of concern on areas examined.       Impression/Plan:  1. Plaque psoriasis - nearly entirely clear. No active plaques. No genital involvement, some joint pain, unclear is psoriasis, on stelara, no side effects  Labs today: AST, ALT, CBC with platelets, Quantiferon TB Gold Plus  Continue Stelara. Hold drug if sick. Plan to get flu shot this fall.  Continue calcipotriene BID, Monday through Friday.  Continue triamcinolone BID (or clobetasol cream for body and solution fo scalp) on the weekends (or during week if flaring). Clobetasol for tough spots on back for up to 2 weeks.   Continue desonide 0.05% BID on the face and groin.   Referral rheumatology for joint pain  Hold drug if fillin ill    Follow-up in 1 year for full skin check and labs, earlier for new or changing lesions.       Staff Involved:  Scribe/Staff    Scribe Disclosure  I, Jodi Kendall, am serving as a scribe to document services personally performed by Dr. Jacey Cervantes MD, based on data collection and the provider's statements to me.    Provider Disclosure:   The documentation recorded by the scribe accurately reflects the services I personally performed and the decisions made by me.    Jacey Cervantes MD    Department of Dermatology  [facility]: Phone: [phone], Fax:[phone]  [facility]: Phone: [phone], Fax: [phone]

## 2019-09-05 NOTE — LETTER
9/5/2019         RE: To White  [id number] [address] [place] [id number]        Dear Colleague,    Thank you for referring your patient, To White, to the [facility]. Please see a copy of my visit note below.    [facility] Dermatology Note      Dermatology Problem List:  1.Plaque psoriasis: 1-3% TBSA  -Current Tx: Stelara, triamcinolone 0.1% ointment, desonide 0.05% ointment, calcipotriene 0.005% ointment, Clobetasol  Past tx: Soriatane 25 mg daily stopped due to pyogenic granulomas 6/2018, Humira  2. Onycholysis of distal right toenails, May be in association with psoriasis.    Encounter Date: Sep 5, 2019    CC:  Chief Complaint   Patient presents with     RECHECK     plaque psoriasis         History of Present Illness:  Mr. To White is a 49 year old male who presents as a follow up for psoriasis. The patient was last seen 2/15/2019 when he showed dramatic improvement while on Stelara and was advised to continue taking it and was also re-started on Atarax 25 mg tablets. Today he reports most of his psoriasis has cleared, but he can still see the shading around areas of past flares. He cannot remember the last time he saw a plaque. Denies recent illnesses or hospitalizations. He plans to get his flu shot this fall. No other concerns.Has not needed his topicals. No changes in medical problems    Past Medical History:   Patient Active Problem List   Diagnosis     Other psoriasis     Obesity     Essential hypertension with goal blood pressure less than 140/90     Obesity (BMI 35.0-39.9) with comorbidity (H)     Past Medical History:   Diagnosis Date     Essential hypertension, benign 2000     Hypertension      Obesity      Other psoriasis     since childhood     Past Surgical History:   Procedure Laterality Date     CHOLECYSTECTOMY       LAPAROSCOPIC CHOLECYSTECTOMY  7/3/2012    Procedure: LAPAROSCOPIC CHOLECYSTECTOMY;  Laparoscopic Cholecystectomy;  Surgeon:  Kulwinder Hollins MD;  Location: [facility] OR     NO HISTORY OF SURGERY         Social History:  The patient works as an .   Kept in chart for convenience.       Family History:  There is no family history of skin cancer.  Kept in chart for convenience.       Medications:  Current Outpatient Medications   Medication Sig Dispense Refill     amLODIPine (NORVASC) 10 MG tablet Take 1 tablet (10 mg) by mouth daily 90 tablet 3     calcipotriene (DOVONOX) 0.005 % cream Apply topically 2 times daily 120 g 1     Fexofenadine HCl (ALLEGRA PO) Reported on 5/19/2017       hydrOXYzine (ATARAX) 25 MG tablet Take 1-2 tablets (25-50 mg) by mouth every 6 hours as needed for itching 180 tablet 1     ustekinumab (STELARA) 90 MG/ML Inject 90 mg at 0 and 4 weeks, and then every 12 weeks thereafter. DO not take until you receive call from dermatology office 4 mL 1     benazepril (LOTENSIN) 40 MG tablet Take 1 tablet (40 mg) by mouth daily 90 tablet 3     calcipotriene 0.005 % OINT Apply to the affected area twice a day Mon-Fri when rash is relatively calm. (Patient not taking: Reported on 9/5/2019) 240 g 3     clobetasol (TEMOVATE) 0.05 % external ointment Apply twice daily for 2 weeks to back. (Patient not taking: Reported on 9/5/2019) 60 g 4     clobetasol (TEMOVATE) 0.05 % external solution Apply on scalp for up to 2 weeks in a row for flares (Patient not taking: Reported on 9/5/2019) 50 mL 3     desonide (DESOWEN) 0.05 % ointment Apply to the affected area of the skin of groin or face daily for 2-3 week bursts as needed. (Patient not taking: Reported on 9/5/2019) 60 g 6     fluocinonide (LIDEX) 0.05 % cream Apply topically 2 times daily 120 g 1     mupirocin (BACTROBAN) 2 % ointment Apply 3 times daily to the nails for 10 days. (Patient not taking: Reported on 9/5/2019) 30 g 0     Pseudoephedrine-Ibuprofen (ADVIL COLD/SINUS PO) Reported on 5/19/2017       triamcinolone (KENALOG) 0.1 % external ointment Apply to affected areas of  the trunk twice a day when flaring, M-F when improved, weekend only when relatively calm. (Patient not taking: Reported on 9/5/2019) 454 g 6       Allergies   Allergen Reactions     No Known Allergies        Review of Systems:  -Skin: As per HPI.   -Constitutional: The patient is doing generally well.   -rheum- has had shoulder pain and stiffness  Physical exam:  Vitals: There were no vitals taken for this visit.  GEN: This is a well developed, well-nourished male in no acute distress, in a pleasant mood.    SKIN: Focused examination of the hands, back, abdomen, and arms was performed.  - Few pink papules scattered on back  - Genitals are clear  - Mild macular erythema on lower abdomen  - Nails clear  -No other lesions of concern on areas examined.       Impression/Plan:  1. Plaque psoriasis - nearly entirely clear. No active plaques. No genital involvement, some joint pain, unclear is psoriasis, on stelara, no side effects  Labs today: AST, ALT, CBC with platelets, Quantiferon TB Gold Plus  Continue Stelara. Hold drug if sick. Plan to get flu shot this fall.  Continue calcipotriene BID, Monday through Friday.  Continue triamcinolone BID (or clobetasol cream for body and solution fo scalp) on the weekends (or during week if flaring). Clobetasol for tough spots on back for up to 2 weeks.   Continue desonide 0.05% BID on the face and groin.   Referral rheumatology for joint pain  Hold drug if fillin ill    Follow-up in 1 year for full skin check and labs, earlier for new or changing lesions.       Staff Involved:  Scribe/Staff    Scribe Disclosure  I, Jodi Kendall, am serving as a scribe to document services personally performed by Dr. Jacey Cervantes MD, based on data collection and the provider's statements to me.    Provider Disclosure:   The documentation recorded by the scribe accurately reflects the services I personally performed and the decisions made by me.    Jacey Cervantes MD    Department of  Dermatology  [facility]: Phone: [phone], Fax:[phone]  [facility]: Phone: [phone], Fax: [phone]          Again, thank you for allowing me to participate in the care of your patient.        Sincerely,        Jacey Cervantes MD

## 2019-09-05 NOTE — NURSING NOTE
To White's goals for this visit include:   Chief Complaint   Patient presents with     RECHECK     plaque psoriasis       He requests these members of his care team be copied on today's visit information: No    PCP: No Ref-Primary, Physician    Referring Provider:  No referring provider defined for this encounter.    There were no vitals taken for this visit.    Do you need any medication refills at today's visit? NO    Chelsi Morales LPN

## 2019-09-09 LAB
GAMMA INTERFERON BACKGROUND BLD IA-ACNC: 0.03 IU/ML
M TB IFN-G BLD-IMP: NEGATIVE
M TB IFN-G CD4+ BCKGRND COR BLD-ACNC: >10 IU/ML
MITOGEN IGNF BCKGRD COR BLD-ACNC: 0 IU/ML
MITOGEN IGNF BCKGRD COR BLD-ACNC: 0.01 IU/ML

## 2019-10-02 ENCOUNTER — HEALTH MAINTENANCE LETTER (OUTPATIENT)
Age: 49
End: 2019-10-02

## 2019-10-07 ENCOUNTER — TELEPHONE (OUTPATIENT)
Dept: DERMATOLOGY | Facility: CLINIC | Age: 49
End: 2019-10-07

## 2019-10-07 NOTE — TELEPHONE ENCOUNTER
PA Initiation    Medication: Stelara  Insurance Company: [employer] ([facility]) - Phone [phone] Fax [phone]  Pharmacy Filling the Rx: [facility] - [place] [address]  Filling Pharmacy Phone: [phone]  Filling Pharmacy Fax:    Start Date: 10/7/2019    Central Prior Authorization Team   Phone: [phone]        Awaiting additional questions via CMM

## 2019-10-08 NOTE — TELEPHONE ENCOUNTER
Prior Authorization Approval    Authorization Effective Date: 10/7/2019  Authorization Expiration Date: 10/7/2020  Medication: Stelara  Approved Dose/Quantity: 1  Reference #: [id number]   Insurance Company: [employer] ([facility]) - Phone [phone] Fax [phone]  Which Pharmacy is filling the prescription (Not needed for infusion/clinic administered): [facility] - [place], [place] - [facility] [address]  Pharmacy Notified: Yes  Patient Notified: **Instructed pharmacy to notify patient when script is ready to /ship.**

## 2019-12-12 DIAGNOSIS — L29.9 ITCHING: ICD-10-CM

## 2019-12-12 RX ORDER — HYDROXYZINE HYDROCHLORIDE 25 MG/1
TABLET, FILM COATED ORAL
Qty: 360 TABLET | Refills: 0 | Status: SHIPPED | OUTPATIENT
Start: 2019-12-12 | End: 2020-07-27

## 2020-01-13 DIAGNOSIS — I10 ESSENTIAL HYPERTENSION WITH GOAL BLOOD PRESSURE LESS THAN 140/90: ICD-10-CM

## 2020-01-13 NOTE — TELEPHONE ENCOUNTER
Pending Prescriptions:                       Disp   Refills    amLODIPine (NORVASC) 10 MG tablet          90 tab*3        Sig: Take 1 tablet (10 mg) by mouth daily    benazepril (LOTENSIN) 40 MG tablet         90 tab*3        Sig: Take 1 tablet (40 mg) by mouth daily    Routing refill request to provider for review/approval because:  Labs BP

## 2020-01-14 RX ORDER — BENAZEPRIL HYDROCHLORIDE 40 MG/1
40 TABLET ORAL DAILY
Qty: 30 TABLET | Refills: 0 | Status: SHIPPED | OUTPATIENT
Start: 2020-01-14 | End: 2020-02-17

## 2020-01-14 RX ORDER — AMLODIPINE BESYLATE 10 MG/1
10 TABLET ORAL DAILY
Qty: 30 TABLET | Refills: 0 | Status: SHIPPED | OUTPATIENT
Start: 2020-01-14 | End: 2020-02-17

## 2020-01-14 NOTE — TELEPHONE ENCOUNTER
Overdue for office visit and labs. I will send in a one month only anaya refill of BP medications. Please call.  CAREY AlvarezC

## 2020-01-15 NOTE — TELEPHONE ENCOUNTER
Spoke to patient and advised him of message below. Patient verbalized understanding, states he will call back to schedule appointment.

## 2020-01-31 NOTE — PROGRESS NOTES
SUBJECTIVE:                                                    To White is a 47 year old male who presents to clinic today for the following health issues:      Psoriasis follow up-med until derm appt     Psoriasis       Duration:  Ongoing for several years     Description  Location:  Trunk and back   Itching: moderate    Intensity:  moderate    Accompanying signs and symptoms: None    History (similar episodes/previous evaluation): yes    Precipitating or alleviating factors:  New exposures:  None  Recent travel: no      Therapies tried and outcome: He saw Dr Monte  Who referred to Pro and also gave Lidex, He has an upcoming appointment with Derm for ,he reports the Lidex helped , but he got a small tube and out of Rx., he had also tried Calcipotriene in the past and it helped            Problem list and histories reviewed & adjusted, as indicated.  Additional history: as documented    Patient Active Problem List   Diagnosis     Other psoriasis     Obesity     Essential hypertension with goal blood pressure less than 140/90     Past Surgical History:   Procedure Laterality Date     CHOLECYSTECTOMY       LAPAROSCOPIC CHOLECYSTECTOMY  7/3/2012    Procedure: LAPAROSCOPIC CHOLECYSTECTOMY;  Laparoscopic Cholecystectomy;  Surgeon: Kulwinder Hollins MD;  Location: [facility]     NO HISTORY OF SURGERY         Social History   Substance Use Topics     Smoking status: Never Smoker     Smokeless tobacco: Never Used     Alcohol use Yes      Comment: Rarely     Family History   Problem Relation Age of Onset     Hypertension Father      CEREBROVASCULAR DISEASE Maternal Grandmother      Cancer - colorectal Maternal Uncle      dx age 45,  before age 50     GASTROINTESTINAL DISEASE Mother      benign colon polyps age over 50     DIABETES No family hx of      Prostate Cancer No family hx of          Current Outpatient Prescriptions   Medication Sig Dispense Refill     fluocinonide (LIDEX) 0.05 % cream Apply  "topically 2 times daily 120 g 1     calcipotriene (DOVONOX) 0.005 % cream Apply topically 2 times daily 120 g 1     hydrOXYzine (ATARAX) 25 MG tablet Take 1-2 tablets (25-50 mg) by mouth every 6 hours as needed for itching 60 tablet 1     amLODIPine (NORVASC) 10 MG tablet Take 1 tablet by mouth  daily 90 tablet 3     benazepril (LOTENSIN) 40 MG tablet Take 1 tablet by mouth  daily 90 tablet 3     Fexofenadine HCl (ALLEGRA PO) Reported on 5/19/2017       Pseudoephedrine-Ibuprofen (ADVIL COLD/SINUS PO) Reported on 5/19/2017       Allergies   Allergen Reactions     No Known Allergies      BP Readings from Last 3 Encounters:   05/19/17 126/74   05/10/17 113/80   04/13/17 120/76    Wt Readings from Last 3 Encounters:   05/19/17 261 lb (118.4 kg)   04/13/17 256 lb (116.1 kg)   08/08/16 268 lb 11.2 oz (121.9 kg)                  Labs reviewed in EPIC    Reviewed and updated as needed this visit by clinical staff       Reviewed and updated as needed this visit by Provider         ROS:  C: NEGATIVE for fever, chills, change in weight  INTEGUMENTARY/SKIN: POSITIVE for psoriasis  E/M: NEGATIVE for ear, mouth and throat problems  R: NEGATIVE for significant cough or SOB  CV: NEGATIVE for chest pain, palpitations or peripheral edema    OBJECTIVE:                                                    /74  Pulse 74  Temp 97.8  F (36.6  C) (Temporal)  Resp 16  Ht 6' 1.75\" (1.873 m)  Wt 261 lb (118.4 kg)  BMI 33.74 kg/m2  Body mass index is 33.74 kg/(m^2).   GENERAL: alert, well nourished, well hydrated, no distress  SKIN:  Erythematous Plaque like rash scattered all over the chest , back , whole trunk , some with silvery violaceous surface   Diagnostic test results:  Diagnostic Test Results:  none      ASSESSMENT/PLAN:                                                    1. Other psoriasis   Refill given . Discussed to keep derm appointment , may benefit fron the newer medications like Etanercept or Adalimumab  - " fluocinonide (LIDEX) 0.05 % cream; Apply topically 2 times daily  Dispense: 120 g; Refill: 1  - calcipotriene (DOVONOX) 0.005 % cream; Apply topically 2 times daily  Dispense: 120 g; Refill: 1    2. Itching    - hydrOXYzine (ATARAX) 25 MG tablet; Take 1-2 tablets (25-50 mg) by mouth every 6 hours as needed for itching  Dispense: 60 tablet; Refill: 1      Follow up with Provider - monty Sexton MD, MD  [facility]     n/a

## 2020-02-11 NOTE — PROGRESS NOTES
Answers for HPI/ROS submitted by the patient on 2/14/2020   Annual Exam:  Frequency of exercise:: 2-3 days/week  Getting at least 3 servings of Calcium per day:: Yes  Diet:: Carbohydrate counting  Taking medications regularly:: Yes  Medication side effects:: Not applicable  Bi-annual eye exam:: Yes  Dental care twice a year:: Yes  Sleep apnea or symptoms of sleep apnea:: None  abdominal pain: No  Blood in stool: No  Blood in urine: No  chest pain: No  chills: No  congestion: No  constipation: No  cough: No  diarrhea: No  dizziness: No  ear pain: No  eye pain: No  nervous/anxious: No  fever: No  frequency: No  genital sores: No  headaches: No  hearing loss: No  heartburn: No  arthralgias: No  joint swelling: No  peripheral edema: No  mood changes: No  myalgias: No  nausea: No  dysuria: No  palpitations: No  Skin sensation changes: No  sore throat: No  urgency: No  rash: No  shortness of breath: No  visual disturbance: No  weakness: No  impotence: Yes  penile discharge: No  Additional concerns today:: No  Duration of exercise:: 30-45 minutes    3  SUBJECTIVE:   CC: To White is an 49 year old male who presents for preventive health visit.     Healthy Habits:    Do you get at least three servings of calcium containing foods daily (dairy, green leafy vegetables, etc.)? yes    Amount of exercise or daily activities, outside of work: 3 day(s) per week    Problems taking medications regularly No    Medication side effects: No    Have you had an eye exam in the past two years? yes    Do you see a dentist twice per year? yes    Do you have sleep apnea, excessive snoring or daytime drowsiness?no  Today's PHQ-2 Score:   PHQ-2 ( 1999 [employer]) 2/14/2020 12/26/2018   Q1: Little interest or pleasure in doing things 0 0   Q2: Feeling down, depressed or hopeless 0 0   PHQ-2 Score 0 0   Q1: Little interest or pleasure in doing things Not at all Not at all   Q2: Feeling down, depressed or hopeless Not at all Not at all   PHQ-2 Score  0 0       Abuse: Current or Past(Physical, Sexual or Emotional)- No  Do you feel safe in your environment? Yes        Social History     Tobacco Use     Smoking status: Never Smoker     Smokeless tobacco: Never Used   Substance Use Topics     Alcohol use: Yes     Comment: Rarely     If you drink alcohol do you typically have >3 drinks per day or >7 drinks per week?                       Last PSA: No results found for: PSA    Reviewed orders with patient. Reviewed health maintenance and updated orders accordingly - Yes  Lab work is in process  Labs reviewed in EPIC  BP Readings from Last 3 Encounters:   20 112/62   18 112/72   18 116/76    Wt Readings from Last 3 Encounters:   20 119.1 kg (262 lb 9.6 oz)   18 125.6 kg (277 lb)   18 117.5 kg (259 lb)                  Patient Active Problem List   Diagnosis     Other psoriasis     Obesity     Essential hypertension with goal blood pressure less than 140/90     Obesity, Class I, BMI 30-34.9     Benign cyst of both testicles     Past Surgical History:   Procedure Laterality Date     CHOLECYSTECTOMY       LAPAROSCOPIC CHOLECYSTECTOMY  7/3/2012    Procedure: LAPAROSCOPIC CHOLECYSTECTOMY;  Laparoscopic Cholecystectomy;  Surgeon: Kulwinder Hollins MD;  Location: [facility]     NO HISTORY OF SURGERY         Social History     Tobacco Use     Smoking status: Never Smoker     Smokeless tobacco: Never Used   Substance Use Topics     Alcohol use: Yes     Comment: Rarely     Family History   Problem Relation Age of Onset     Hypertension Father      Cerebrovascular Disease Maternal Grandmother      Cancer - colorectal Maternal Uncle         dx age 45,  before age 50     Gastrointestinal Disease Mother         benign colon polyps age over 50     Diabetes No family hx of      Prostate Cancer No family hx of          Current Outpatient Medications   Medication Sig Dispense Refill     amLODIPine (NORVASC) 10 MG tablet Take 1 tablet (10 mg) by mouth  daily 90 tablet 1     benazepril (LOTENSIN) 40 MG tablet Take 1 tablet (40 mg) by mouth daily 90 tablet 1     calcipotriene (DOVONOX) 0.005 % cream Apply topically 2 times daily 120 g 1     calcipotriene 0.005 % OINT Apply to the affected area twice a day Mon-Fri when rash is relatively calm. 240 g 3     clobetasol (TEMOVATE) 0.05 % external ointment Apply twice daily for 2 weeks to back. 60 g 4     clobetasol (TEMOVATE) 0.05 % external solution Apply on scalp for up to 2 weeks in a row for flares 50 mL 3     desonide (DESOWEN) 0.05 % ointment Apply to the affected area of the skin of groin or face daily for 2-3 week bursts as needed. 60 g 6     Fexofenadine HCl (ALLEGRA PO) Reported on 5/19/2017       hydrOXYzine (ATARAX) 25 MG tablet TAKE 1-2 TABLETS BY MOUTH  EVERY 6 HOURS AS NEEDED FOR ITCHING 360 tablet 0     Pseudoephedrine-Ibuprofen (ADVIL COLD/SINUS PO) Reported on 5/19/2017       triamcinolone (KENALOG) 0.1 % external ointment Apply to affected areas of the trunk twice a day when flaring, M-F when improved, weekend only when relatively calm. 454 g 6     ustekinumab (STELARA) 90 MG/ML Inject 90mg every 12 weeks 1 mL 3     Allergies   Allergen Reactions     No Known Allergies      Recent Labs   Lab Test 09/05/19  0814 12/21/18  0847 10/30/18  1704  06/29/18  1654 06/08/18  0930 05/15/18  0747 05/01/18  1551 01/19/18  0738 12/28/17  0901 08/08/16  1749   LDL  --   --   --   --   --   --   --   --  100* 130* 98   HDL  --   --   --   --   --   --   --   --  53 63 47   TRIG  --   --   --   --   --  176* 249* 180* 75 174* 179*   ALT 25 19 28   < > 23  --  26 24 22 30  --    CR  --   --   --   --  1.12  --   --  0.97 1.03 0.98 0.97   GFRESTIMATED  --   --   --   --  70  --   --  83 77 82 84   GFRESTBLACK  --   --   --   --  85  --   --  >90 >90 >90 >90  African American GFR Calc     POTASSIUM  --   --   --   --  3.6  --   --  4.0 4.6 3.8 3.9    < > = values in this interval not displayed.        Reviewed and  "updated as needed this visit by clinical staff  Tobacco  Allergies  Meds  Med Hx  Surg Hx  Fam Hx  Soc Hx        Reviewed and updated as needed this visit by Provider        Past Medical History:   Diagnosis Date     Essential hypertension, benign 2000     Hypertension      Obesity      Other psoriasis     since childhood      Past Surgical History:   Procedure Laterality Date     CHOLECYSTECTOMY       LAPAROSCOPIC CHOLECYSTECTOMY  7/3/2012    Procedure: LAPAROSCOPIC CHOLECYSTECTOMY;  Laparoscopic Cholecystectomy;  Surgeon: Kulwinder Hollins MD;  Location: [facility]     NO HISTORY OF SURGERY         ROS:  CONSTITUTIONAL: NEGATIVE for fever, chills, change in weight  INTEGUMENTARY/SKIN: NEGATIVE for worrisome rashes, moles or lesions  EYES: NEGATIVE for vision changes or irritation  ENT: NEGATIVE for ear, mouth and throat problems  RESP: NEGATIVE for significant cough or SOB  CV: NEGATIVE for chest pain, palpitations or peripheral edema  GI: NEGATIVE for nausea, abdominal pain, heartburn, or change in bowel habits   male: negative for dysuria, hematuria, decreased urinary stream, erectile dysfunction, urethral discharge  MUSCULOSKELETAL: NEGATIVE for significant arthralgias or myalgia  NEURO: NEGATIVE for weakness, dizziness or paresthesias  PSYCHIATRIC: NEGATIVE for changes in mood or affect    OBJECTIVE:   /62   Pulse 99   Temp 98  F (36.7  C) (Temporal)   Resp 16   Ht 1.875 m (6' 1.82\")   Wt 119.1 kg (262 lb 9.6 oz)   BMI 33.88 kg/m    EXAM:  GENERAL: healthy, alert and no distress  EYES: Eyes grossly normal to inspection, PERRL and conjunctivae and sclerae normal  HENT: ear canals and TM's normal, nose and mouth without ulcers or lesions  NECK: no adenopathy, no asymmetry, masses, or scars and thyroid normal to palpation  RESP: lungs clear to auscultation - no rales, rhonchi or wheezes  CV: regular rate and rhythm, normal S1 S2, no S3 or S4, no murmur, click or rub, no peripheral edema and " peripheral pulses strong  ABDOMEN: soft, nontender, no hepatosplenomegaly, no masses and bowel sounds normal   (male): epididymal enlargement bilateral, no hernias and penis normal without urethral discharge  MS: no gross musculoskeletal defects noted, no edema  SKIN: no suspicious lesions or rashes  NEURO: Normal strength and tone, mentation intact and speech normal  PSYCH: mentation appears normal, affect normal/bright    Diagnostic Test Results:  Labs reviewed in Epic  No results found for this or any previous visit (from the past 24 hour(s)).    ASSESSMENT/PLAN:   1. Routine general medical examination at a health care facility  Screening labs to be drawn today based on our discussion.  Follow-up in 1 year for repeat physical exam.  - Lipid panel reflex to direct LDL Fasting  - Comprehensive metabolic panel (BMP + Alb, Alk Phos, ALT, AST, Total. Bili, TP)  - TSH with free T4 reflex  - CBC with platelets and differential    2. Essential hypertension with goal blood pressure less than 140/90  Excellent control of his blood pressure at this point time refilled medications and taking related labs.  Follow-up in 6 months advised.  - Lipid panel reflex to direct LDL Fasting  - Comprehensive metabolic panel (BMP + Alb, Alk Phos, ALT, AST, Total. Bili, TP)  - amLODIPine (NORVASC) 10 MG tablet; Take 1 tablet (10 mg) by mouth daily  Dispense: 90 tablet; Refill: 1  - benazepril (LOTENSIN) 40 MG tablet; Take 1 tablet (40 mg) by mouth daily  Dispense: 90 tablet; Refill: 1    3. Obesity, Class I, BMI 30-34.9  Encouraged diet and exercise constraints and changes to help bring his weight down even more.  We note that he has lost some weight since the last time that he was seen.    4. Benign cyst of both testicles  Ongoing long-term problem from what I can tell goes back all the way to 2013 and possibly earlier.  He states that he seems to be having a little bit more discomfort in this region and we talked about getting an  "ultrasound rechecked and have him see urology for further evaluation and treatment options.  It may be helpful for have him to have these surgically removed.  - US Testicular and Scrotum; Future  - UROLOGY ADULT REFERRAL    COUNSELING:  Reviewed preventive health counseling, as reflected in patient instructions       Regular exercise       Healthy diet/nutrition       Vision screening       Hearing screening    Estimated body mass index is 33.88 kg/m  as calculated from the following:    Height as of this encounter: 1.875 m (6' 1.82\").    Weight as of this encounter: 119.1 kg (262 lb 9.6 oz).    Weight management plan: Discussed healthy diet and exercise guidelines     reports that he has never smoked. He has never used smokeless tobacco.      Counseling Resources:  ATP IV Guidelines  Pooled Cohorts Equation Calculator  FRAX Risk Assessment  ICSI Preventive Guidelines  Dietary Guidelines for Americans, 2010  USDA's MyPlate  ASA Prophylaxis  Lung CA Screening    Jomar Reno PA-C  [facility]  "

## 2020-02-14 ASSESSMENT — ENCOUNTER SYMPTOMS
NERVOUS/ANXIOUS: 0
DIZZINESS: 0
HEMATURIA: 0
JOINT SWELLING: 0
EYE PAIN: 0
PARESTHESIAS: 0
CONSTIPATION: 0
HEARTBURN: 0
HEADACHES: 0
PALPITATIONS: 0
COUGH: 0
ARTHRALGIAS: 0
FEVER: 0
MYALGIAS: 0
CHILLS: 0
FREQUENCY: 0
DYSURIA: 0
SORE THROAT: 0
HEMATOCHEZIA: 0
ABDOMINAL PAIN: 0
SHORTNESS OF BREATH: 0
NAUSEA: 0
DIARRHEA: 0
WEAKNESS: 0

## 2020-02-17 ENCOUNTER — OFFICE VISIT (OUTPATIENT)
Dept: FAMILY MEDICINE | Facility: OTHER | Age: 50
End: 2020-02-17
Payer: COMMERCIAL

## 2020-02-17 ENCOUNTER — ANCILLARY PROCEDURE (OUTPATIENT)
Dept: ULTRASOUND IMAGING | Facility: OTHER | Age: 50
End: 2020-02-17
Attending: PHYSICIAN ASSISTANT
Payer: COMMERCIAL

## 2020-02-17 VITALS
TEMPERATURE: 98 F | DIASTOLIC BLOOD PRESSURE: 62 MMHG | RESPIRATION RATE: 16 BRPM | BODY MASS INDEX: 33.7 KG/M2 | HEART RATE: 99 BPM | SYSTOLIC BLOOD PRESSURE: 112 MMHG | WEIGHT: 262.6 LBS | HEIGHT: 74 IN

## 2020-02-17 DIAGNOSIS — N44.2 BENIGN CYST OF BOTH TESTICLES: ICD-10-CM

## 2020-02-17 DIAGNOSIS — I10 ESSENTIAL HYPERTENSION WITH GOAL BLOOD PRESSURE LESS THAN 140/90: ICD-10-CM

## 2020-02-17 DIAGNOSIS — Z00.00 ROUTINE GENERAL MEDICAL EXAMINATION AT A HEALTH CARE FACILITY: Primary | ICD-10-CM

## 2020-02-17 DIAGNOSIS — E66.811 OBESITY, CLASS I, BMI 30-34.9: ICD-10-CM

## 2020-02-17 LAB
ALBUMIN SERPL-MCNC: 3.8 G/DL (ref 3.4–5)
ALP SERPL-CCNC: 83 U/L (ref 40–150)
ALT SERPL W P-5'-P-CCNC: 26 U/L (ref 0–70)
ANION GAP SERPL CALCULATED.3IONS-SCNC: 4 MMOL/L (ref 3–14)
AST SERPL W P-5'-P-CCNC: 17 U/L (ref 0–45)
BASOPHILS # BLD AUTO: 0.1 10E9/L (ref 0–0.2)
BASOPHILS NFR BLD AUTO: 1 %
BILIRUB SERPL-MCNC: 0.5 MG/DL (ref 0.2–1.3)
BUN SERPL-MCNC: 15 MG/DL (ref 7–30)
CALCIUM SERPL-MCNC: 8.8 MG/DL (ref 8.5–10.1)
CHLORIDE SERPL-SCNC: 110 MMOL/L (ref 94–109)
CHOLEST SERPL-MCNC: 178 MG/DL
CO2 SERPL-SCNC: 27 MMOL/L (ref 20–32)
CREAT SERPL-MCNC: 1.14 MG/DL (ref 0.66–1.25)
DIFFERENTIAL METHOD BLD: NORMAL
EOSINOPHIL # BLD AUTO: 0.6 10E9/L (ref 0–0.7)
EOSINOPHIL NFR BLD AUTO: 6.8 %
ERYTHROCYTE [DISTWIDTH] IN BLOOD BY AUTOMATED COUNT: 13.9 % (ref 10–15)
GFR SERPL CREATININE-BSD FRML MDRD: 75 ML/MIN/{1.73_M2}
GLUCOSE SERPL-MCNC: 95 MG/DL (ref 70–99)
HCT VFR BLD AUTO: 43.8 % (ref 40–53)
HDLC SERPL-MCNC: 43 MG/DL
HGB BLD-MCNC: 14 G/DL (ref 13.3–17.7)
LDLC SERPL CALC-MCNC: 114 MG/DL
LYMPHOCYTES # BLD AUTO: 3 10E9/L (ref 0.8–5.3)
LYMPHOCYTES NFR BLD AUTO: 36.2 %
MCH RBC QN AUTO: 29.6 PG (ref 26.5–33)
MCHC RBC AUTO-ENTMCNC: 32 G/DL (ref 31.5–36.5)
MCV RBC AUTO: 93 FL (ref 78–100)
MONOCYTES # BLD AUTO: 0.6 10E9/L (ref 0–1.3)
MONOCYTES NFR BLD AUTO: 7 %
NEUTROPHILS # BLD AUTO: 4 10E9/L (ref 1.6–8.3)
NEUTROPHILS NFR BLD AUTO: 49 %
NONHDLC SERPL-MCNC: 135 MG/DL
PLATELET # BLD AUTO: 350 10E9/L (ref 150–450)
POTASSIUM SERPL-SCNC: 4.1 MMOL/L (ref 3.4–5.3)
PROT SERPL-MCNC: 7.8 G/DL (ref 6.8–8.8)
RBC # BLD AUTO: 4.73 10E12/L (ref 4.4–5.9)
SODIUM SERPL-SCNC: 141 MMOL/L (ref 133–144)
TRIGL SERPL-MCNC: 107 MG/DL
TSH SERPL DL<=0.005 MIU/L-ACNC: 1.92 MU/L (ref 0.4–4)
WBC # BLD AUTO: 8.2 10E9/L (ref 4–11)

## 2020-02-17 PROCEDURE — 80061 LIPID PANEL: CPT | Performed by: PHYSICIAN ASSISTANT

## 2020-02-17 PROCEDURE — 80050 GENERAL HEALTH PANEL: CPT | Performed by: PHYSICIAN ASSISTANT

## 2020-02-17 PROCEDURE — 93976 VASCULAR STUDY: CPT

## 2020-02-17 PROCEDURE — 76870 US EXAM SCROTUM: CPT

## 2020-02-17 PROCEDURE — 99396 PREV VISIT EST AGE 40-64: CPT | Performed by: PHYSICIAN ASSISTANT

## 2020-02-17 PROCEDURE — 36415 COLL VENOUS BLD VENIPUNCTURE: CPT | Performed by: PHYSICIAN ASSISTANT

## 2020-02-17 PROCEDURE — 99213 OFFICE O/P EST LOW 20 MIN: CPT | Mod: 25 | Performed by: PHYSICIAN ASSISTANT

## 2020-02-17 RX ORDER — AMLODIPINE BESYLATE 10 MG/1
10 TABLET ORAL DAILY
Qty: 90 TABLET | Refills: 1 | Status: SHIPPED | OUTPATIENT
Start: 2020-02-17 | End: 2020-07-07

## 2020-02-17 RX ORDER — BENAZEPRIL HYDROCHLORIDE 40 MG/1
40 TABLET ORAL DAILY
Qty: 90 TABLET | Refills: 1 | Status: SHIPPED | OUTPATIENT
Start: 2020-02-17 | End: 2020-07-07

## 2020-02-17 ASSESSMENT — MIFFLIN-ST. JEOR: SCORE: 2128.03

## 2020-02-17 ASSESSMENT — PAIN SCALES - GENERAL: PAINLEVEL: NO PAIN (0)

## 2020-04-06 ENCOUNTER — TELEPHONE (OUTPATIENT)
Dept: UROLOGY | Facility: CLINIC | Age: 50
End: 2020-04-06

## 2020-04-06 NOTE — TELEPHONE ENCOUNTER
Reason for call:  Other   Patient called regarding (reason for call): call back  Additional comments: Patient is wondering if he can still be seen even though he is a new patient. Please call back to discuss.    Phone number to reach patient:  Work number on file:  [phone] (work)    Best Time:  any    Can we leave a detailed message on this number?  YES    Travel screening: Negative

## 2020-04-09 ENCOUNTER — VIRTUAL VISIT (OUTPATIENT)
Dept: UROLOGY | Facility: OTHER | Age: 50
End: 2020-04-09
Payer: COMMERCIAL

## 2020-04-09 DIAGNOSIS — N50.3 EPIDIDYMAL CYST: Primary | ICD-10-CM

## 2020-04-09 PROCEDURE — 99201 ZZC OFFICE/OUTPT VISIT, NEW, LEVEL I: CPT | Mod: GT | Performed by: UROLOGY

## 2020-04-09 NOTE — PROGRESS NOTES
"To White is a 50 year old male who is being evaluated via a billable video visit.      The patient has been notified of following:     \"This video visit will be conducted via a call between you and your physician/provider. We have found that certain health care needs can be provided without the need for an in-person physical exam.  This service lets us provide the care you need with a video conversation.  If a prescription is necessary we can send it directly to your pharmacy.  If lab work is needed we can place an order for that and you can then stop by our lab to have the test done at a later time.    Video visits are billed at different rates depending on your insurance coverage.  Please reach out to your insurance provider with any questions.    If during the course of the call the physician/provider feels a video visit is not appropriate, you will not be charged for this service.\"    Patient has given verbal consent for Video visit? Yes    How would you like to obtain your AVS? Marquisehart    Patient would like the video invitation sent by: Send to e-mail at:  [email or web address]       Video Start Time:  929    To White complains of  No chief complaint on file.      I have reviewed and updated the patient's Past Medical History, Social History, Family History and Medication List.    ALLERGIES  No known allergies    Additional provider notes:  Patient with h/o enlarging epididymal cysts for several years.  He has no major complaints about it.  Recent scrotal ultrasound reviewed and discussed.  Treatment options discussed.  Patient was reassured.  F/u with me as needed.      Video-Visit Details    Type of service:  Video Visit    Video End Time (time video stopped): 940    Originating Location (pt. Location): Home    Distant Location (provider location):  [facility]     Mode of Communication:  Video Conference via [facility]      Kalin He MD      "

## 2020-04-22 ENCOUNTER — TELEPHONE (OUTPATIENT)
Dept: RHEUMATOLOGY | Facility: CLINIC | Age: 50
End: 2020-04-22

## 2020-04-22 NOTE — TELEPHONE ENCOUNTER
Left VM for patient to return clinics call to change appointment on 4/24/20 to a video visit due to covid-19. Charisma Cunningham CMA

## 2020-04-23 NOTE — TELEPHONE ENCOUNTER
Pt returned phone call and states he is hesitant to do a video visit because he feels the provider may need to feel his shoulder. Pt is looking for a call back from clinic to discuss his concerns.

## 2020-04-23 NOTE — TELEPHONE ENCOUNTER
Patient preferred an in person appointment and rescheduled. He would like to know if Dr. العلي would recommend getting xrays prior to visit. If so he would be willing to do this.     J LUIS IglesiasN, RN   Rheumatology Care Coordinator   [facility]

## 2020-07-06 ENCOUNTER — TELEPHONE (OUTPATIENT)
Dept: FAMILY MEDICINE | Facility: OTHER | Age: 50
End: 2020-07-06

## 2020-07-06 DIAGNOSIS — I10 ESSENTIAL HYPERTENSION WITH GOAL BLOOD PRESSURE LESS THAN 140/90: ICD-10-CM

## 2020-07-07 RX ORDER — BENAZEPRIL HYDROCHLORIDE 40 MG/1
TABLET ORAL
Qty: 90 TABLET | Refills: 0 | Status: SHIPPED | OUTPATIENT
Start: 2020-07-07 | End: 2020-07-27

## 2020-07-07 RX ORDER — AMLODIPINE BESYLATE 10 MG/1
TABLET ORAL
Qty: 90 TABLET | Refills: 0 | Status: SHIPPED | OUTPATIENT
Start: 2020-07-07 | End: 2020-07-27

## 2020-07-07 NOTE — TELEPHONE ENCOUNTER
Spoke with pt, he does not agree with this plan. He states for years and years he has been doing his physical once a year and getting his refills for the year at that time. Nothing has changed. He does not recall the lab results needing to be rechecked in 6 months and states that it isn't quite 6 months anyways.   Pt would like a reason for a 6 month visit as he feels this is a waste of his money and your time.  He does not believe this is policy as he has never needed a 6 month visit before.     I told pt that I would pass this on and would reach out with more info tomorrow morning.

## 2020-07-07 NOTE — TELEPHONE ENCOUNTER
The following is my chart message to him related to his labs.  It is also wise for him to monitor his blood pressure and make sure that it stays within prescribed limits based on what we know.    To:   Your thyroid and complete blood cell count are within normal limits.   Your LDL cholesterol is slightly elevated I would strongly recommend that you take careful look at diet and exercise to bring this down to less than 100 as an optimal goal like you have been in the past.  Exercise will increase your HDL cholesterol which is good to help you fight heart disease in the long run.   Your chloride was slightly elevated above normal which more than likely means that you do not drink enough water.  Please try to make some changes and I would recommend that you get these 2 abnormal labs rechecked in 6 months.   Electronically signed:       Jomar Reno PA-C

## 2020-07-07 NOTE — TELEPHONE ENCOUNTER
LMTCB. When patient calls back please give message below.     Patient needs an office visit for further refills.     Indra Lambert MA on 7/7/2020 at 9:36 AM

## 2020-07-07 NOTE — TELEPHONE ENCOUNTER
It is certainly his right to have that opinion.  However if I am to be accountable for his medication and for his medication could do for him and do to his body I need to see him on a regular basis to prove that #1 the medication is working like it should and #2 that is not causing any harm.  This is the reason back behind every 6 months when we start using medications like this to monitor carefully.  He may certainly choose another primary care provider who may be willing to see him only once a year for his medications.  Electronically signed:    Jomar Reno PA-C

## 2020-07-07 NOTE — TELEPHONE ENCOUNTER
Left message asking patient to return call.    Please inform patient of message from Provider below.   Please assist in scheduling patient

## 2020-07-08 NOTE — TELEPHONE ENCOUNTER
Patient has been informed and states that he will look for a different provider that will approve medications based on being seen once a year   Closing encounter  Barbara Ochoa RT (R)

## 2020-07-24 NOTE — PROGRESS NOTES
"To White is a 50 year old male who is being evaluated via a billable video visit.      The patient has been notified of following:     \"This video visit will be conducted via a call between you and your physician/provider. We have found that certain health care needs can be provided without the need for an in-person physical exam.  This service lets us provide the care you need with a video conversation.  If a prescription is necessary we can send it directly to your pharmacy.  If lab work is needed we can place an order for that and you can then stop by our lab to have the test done at a later time.    Video visits are billed at different rates depending on your insurance coverage.  Please reach out to your insurance provider with any questions.    If during the course of the call the physician/provider feels a video visit is not appropriate, you will not be charged for this service.\"    Patient has given verbal consent for Video visit? Yes  How would you like to obtain your AVS? [employer]  If you are dropped from the video visit, the video invite should be resent to: Text to cell phone: connected via [employer]  Will anyone else be joining your video visit? No      Subjective     To White is a 50 year old male who presents today via video visit for the following health issues:    History of Present Illness        Hypertension: He presents for follow up of hypertension.  He does check blood pressure  regularly outside of the clinic. Outside blood pressures have been over 140/90. He follows a low salt diet.     He eats 2-3 servings of fruits and vegetables daily.He consumes 0 sweetened beverage(s) daily.He exercises with enough effort to increase his heart rate 30 to 60 minutes per day.  He exercises with enough effort to increase his heart rate 3 or less days per week.   He is taking medications regularly.    Patient presents today for follow-up of blood pressure. Numbers have been well controlled. Tolerating " medications well without side effects. Monitoring salt in diet. Patient denies headaches, vision changes, chest pain, shortness of breath or paresthesias.    Video Start Time: 2:42 PM    Patient Active Problem List   Diagnosis     Other psoriasis     Obesity     Essential hypertension with goal blood pressure less than 140/90     Obesity, Class I, BMI 30-34.9     Benign cyst of both testicles     Past Surgical History:   Procedure Laterality Date     CHOLECYSTECTOMY       LAPAROSCOPIC CHOLECYSTECTOMY  7/3/2012    Procedure: LAPAROSCOPIC CHOLECYSTECTOMY;  Laparoscopic Cholecystectomy;  Surgeon: Kulwinder Hollins MD;  Location: [facility]     NO HISTORY OF SURGERY         Social History     Tobacco Use     Smoking status: Never Smoker     Smokeless tobacco: Never Used   Substance Use Topics     Alcohol use: Yes     Comment: Rarely     Family History   Problem Relation Age of Onset     Hypertension Father      Cerebrovascular Disease Maternal Grandmother      Cancer - colorectal Maternal Uncle         dx age 45,  before age 50     Gastrointestinal Disease Mother         benign colon polyps age over 50     Diabetes No family hx of      Prostate Cancer No family hx of          Current Outpatient Medications   Medication Sig Dispense Refill     amLODIPine (NORVASC) 10 MG tablet Take 1 tablet (10 mg) by mouth daily 90 tablet 3     benazepril (LOTENSIN) 40 MG tablet Take 1 tablet (40 mg) by mouth daily 90 tablet 3     hydrOXYzine (ATARAX) 25 MG tablet TAKE 1-2 TABLETS BY MOUTH  EVERY 6 HOURS AS NEEDED FOR ITCHING 360 tablet 1     calcipotriene (DOVONOX) 0.005 % cream Apply topically 2 times daily 120 g 1     calcipotriene 0.005 % OINT Apply to the affected area twice a day Mon-Fri when rash is relatively calm. 240 g 3     clobetasol (TEMOVATE) 0.05 % external ointment Apply twice daily for 2 weeks to back. 60 g 4     clobetasol (TEMOVATE) 0.05 % external solution Apply on scalp for up to 2 weeks in a row for flares 50 mL 3      desonide (DESOWEN) 0.05 % ointment Apply to the affected area of the skin of groin or face daily for 2-3 week bursts as needed. 60 g 6     Fexofenadine HCl (ALLEGRA PO) Reported on 5/19/2017       Pseudoephedrine-Ibuprofen (ADVIL COLD/SINUS PO) Reported on 5/19/2017       triamcinolone (KENALOG) 0.1 % external ointment Apply to affected areas of the trunk twice a day when flaring, M-F when improved, weekend only when relatively calm. 454 g 6     ustekinumab (STELARA) 90 MG/ML Inject 90mg every 12 weeks 1 mL 3     Allergies   Allergen Reactions     No Known Allergies        Reviewed and updated as needed this visit by Provider         Review of Systems   Constitutional, HEENT, cardiovascular, pulmonary, GI, , musculoskeletal, neuro, skin, endocrine and psych systems are negative, except as otherwise noted.      Objective             Physical Exam     GENERAL: Healthy, alert and no distress  EYES: Eyes grossly normal to inspection.  No discharge or erythema, or obvious scleral/conjunctival abnormalities.  RESP: No audible wheeze, cough, or visible cyanosis.  No visible retractions or increased work of breathing.    SKIN: Visible skin clear. No significant rash, abnormal pigmentation or lesions.  NEURO: Cranial nerves grossly intact.  Mentation and speech appropriate for age.  PSYCH: Mentation appears normal, affect normal/bright, judgement and insight intact, normal speech and appearance well-groomed.      Diagnostic Test Results:  Labs reviewed in Epic        Assessment & Plan     1. Essential hypertension with goal blood pressure less than 140/90  Overall blood pressure readings outpatient have been normal. Patient tolerating medication well. Labs are up to date. He will continue to check his blood pressure periodically. Advised he continue to work on diet and exercise modifications as well.   - amLODIPine (NORVASC) 10 MG tablet; Take 1 tablet (10 mg) by mouth daily  Dispense: 90 tablet; Refill: 3  -  benazepril (LOTENSIN) 40 MG tablet; Take 1 tablet (40 mg) by mouth daily  Dispense: 90 tablet; Refill: 3    2. Obesity, Class I, BMI 30-34.9  Weight addressed. Encouraged ongoing diet and exercise modifications.     3. Itching  Related to psoriasis.   - hydrOXYzine (ATARAX) 25 MG tablet; TAKE 1-2 TABLETS BY MOUTH  EVERY 6 HOURS AS NEEDED FOR ITCHING  Dispense: 360 tablet; Refill: 1    4. Other psoriasis  Follows with derm.     The patient indicates understanding of these issues and agrees with the plan.    Deidre Peralta PA-C  [facility]      Video-Visit Details    Type of service:  Video Visit    Video End Time:2:49 PM    Originating Location (pt. Location): Home    Distant Location (provider location):  [facility]     Platform used for Video Visit: [employer]    No follow-ups on file.       Deidre Peralta PA-C

## 2020-07-27 ENCOUNTER — VIRTUAL VISIT (OUTPATIENT)
Dept: FAMILY MEDICINE | Facility: OTHER | Age: 50
End: 2020-07-27
Payer: COMMERCIAL

## 2020-07-27 DIAGNOSIS — I10 ESSENTIAL HYPERTENSION WITH GOAL BLOOD PRESSURE LESS THAN 140/90: Primary | ICD-10-CM

## 2020-07-27 DIAGNOSIS — E66.811 OBESITY, CLASS I, BMI 30-34.9: ICD-10-CM

## 2020-07-27 DIAGNOSIS — L40.8 OTHER PSORIASIS: ICD-10-CM

## 2020-07-27 DIAGNOSIS — L29.9 ITCHING: ICD-10-CM

## 2020-07-27 PROCEDURE — 99214 OFFICE O/P EST MOD 30 MIN: CPT | Mod: 95 | Performed by: PHYSICIAN ASSISTANT

## 2020-07-27 RX ORDER — HYDROXYZINE HYDROCHLORIDE 25 MG/1
TABLET, FILM COATED ORAL
Qty: 360 TABLET | Refills: 1 | Status: SHIPPED | OUTPATIENT
Start: 2020-07-27 | End: 2021-12-17

## 2020-07-27 RX ORDER — AMLODIPINE BESYLATE 10 MG/1
10 TABLET ORAL DAILY
Qty: 90 TABLET | Refills: 3 | Status: SHIPPED | OUTPATIENT
Start: 2020-07-27 | End: 2021-08-16

## 2020-07-27 RX ORDER — BENAZEPRIL HYDROCHLORIDE 40 MG/1
40 TABLET ORAL DAILY
Qty: 90 TABLET | Refills: 3 | Status: SHIPPED | OUTPATIENT
Start: 2020-07-27 | End: 2021-08-16

## 2020-08-15 DIAGNOSIS — L40.9 PSORIASIS: ICD-10-CM

## 2020-08-19 DIAGNOSIS — Z51.81 MEDICATION MONITORING ENCOUNTER: Primary | ICD-10-CM

## 2020-08-19 RX ORDER — USTEKINUMAB 90 MG/ML
90 INJECTION, SOLUTION SUBCUTANEOUS
Qty: 1 ML | Refills: 1 | Status: SHIPPED | OUTPATIENT
Start: 2020-08-19 | End: 2020-09-10

## 2020-08-19 RX ORDER — USTEKINUMAB 90 MG/ML
90 INJECTION, SOLUTION SUBCUTANEOUS
Qty: 1 ML | Refills: 1 | OUTPATIENT
Start: 2020-08-19 | End: 2020-08-19

## 2020-08-19 NOTE — TELEPHONE ENCOUNTER
Received refill request for Stelara. Patient chart reviewed. Refill accepted. Patient due to monitoring labs and TB test and will place order. Rx routed to Dr. Billy Lacy MD  Dermatology Resident  [facility]

## 2020-08-19 NOTE — TELEPHONE ENCOUNTER
STELARA 90 MG/ML      Last Written Prescription Date:  9-5-19  Last Fill Quantity: 1 ml,   # refills: 3  Last Office Visit : 9-5-19  Future Office visit:  9-    Routing refill request to provider for review/approval because:  Not on protocol.        Kathleen M Doege RN

## 2020-09-04 DIAGNOSIS — Z51.81 MEDICATION MONITORING ENCOUNTER: ICD-10-CM

## 2020-09-04 LAB
ALBUMIN SERPL-MCNC: 4 G/DL (ref 3.4–5)
ALP SERPL-CCNC: 92 U/L (ref 40–150)
ALT SERPL W P-5'-P-CCNC: 21 U/L (ref 0–70)
ANION GAP SERPL CALCULATED.3IONS-SCNC: 6 MMOL/L (ref 3–14)
AST SERPL W P-5'-P-CCNC: 13 U/L (ref 0–45)
BASOPHILS # BLD AUTO: 0.1 10E9/L (ref 0–0.2)
BASOPHILS NFR BLD AUTO: 0.5 %
BILIRUB SERPL-MCNC: 0.4 MG/DL (ref 0.2–1.3)
BUN SERPL-MCNC: 15 MG/DL (ref 7–30)
CALCIUM SERPL-MCNC: 9.2 MG/DL (ref 8.5–10.1)
CHLORIDE SERPL-SCNC: 105 MMOL/L (ref 94–109)
CO2 SERPL-SCNC: 27 MMOL/L (ref 20–32)
CREAT SERPL-MCNC: 1.11 MG/DL (ref 0.66–1.25)
DIFFERENTIAL METHOD BLD: ABNORMAL
EOSINOPHIL # BLD AUTO: 0.7 10E9/L (ref 0–0.7)
EOSINOPHIL NFR BLD AUTO: 5.3 %
ERYTHROCYTE [DISTWIDTH] IN BLOOD BY AUTOMATED COUNT: 13.3 % (ref 10–15)
GFR SERPL CREATININE-BSD FRML MDRD: 77 ML/MIN/{1.73_M2}
GLUCOSE SERPL-MCNC: 97 MG/DL (ref 70–99)
HCT VFR BLD AUTO: 41.6 % (ref 40–53)
HGB BLD-MCNC: 13.9 G/DL (ref 13.3–17.7)
LYMPHOCYTES # BLD AUTO: 4.1 10E9/L (ref 0.8–5.3)
LYMPHOCYTES NFR BLD AUTO: 33.7 %
MCH RBC QN AUTO: 30.7 PG (ref 26.5–33)
MCHC RBC AUTO-ENTMCNC: 33.4 G/DL (ref 31.5–36.5)
MCV RBC AUTO: 92 FL (ref 78–100)
MONOCYTES # BLD AUTO: 0.9 10E9/L (ref 0–1.3)
MONOCYTES NFR BLD AUTO: 7.6 %
NEUTROPHILS # BLD AUTO: 6.4 10E9/L (ref 1.6–8.3)
NEUTROPHILS NFR BLD AUTO: 52.9 %
PLATELET # BLD AUTO: 327 10E9/L (ref 150–450)
POTASSIUM SERPL-SCNC: 4.6 MMOL/L (ref 3.4–5.3)
PROT SERPL-MCNC: 8 G/DL (ref 6.8–8.8)
RBC # BLD AUTO: 4.53 10E12/L (ref 4.4–5.9)
SODIUM SERPL-SCNC: 138 MMOL/L (ref 133–144)
WBC # BLD AUTO: 12.2 10E9/L (ref 4–11)

## 2020-09-04 PROCEDURE — 85025 COMPLETE CBC W/AUTO DIFF WBC: CPT | Performed by: DERMATOLOGY

## 2020-09-04 PROCEDURE — 86481 TB AG RESPONSE T-CELL SUSP: CPT | Performed by: DERMATOLOGY

## 2020-09-04 PROCEDURE — 80053 COMPREHEN METABOLIC PANEL: CPT | Performed by: DERMATOLOGY

## 2020-09-04 PROCEDURE — 36415 COLL VENOUS BLD VENIPUNCTURE: CPT | Performed by: DERMATOLOGY

## 2020-09-08 LAB
GAMMA INTERFERON BACKGROUND BLD IA-ACNC: 0.03 IU/ML
M TB IFN-G CD4+ BCKGRND COR BLD-ACNC: 9.97 IU/ML
M TB TUBERC IFN-G BLD QL: NEGATIVE
MITOGEN IGNF BCKGRD COR BLD-ACNC: 0 IU/ML
MITOGEN IGNF BCKGRD COR BLD-ACNC: 0.01 IU/ML

## 2020-09-10 ENCOUNTER — VIRTUAL VISIT (OUTPATIENT)
Dept: DERMATOLOGY | Facility: CLINIC | Age: 50
End: 2020-09-10
Payer: COMMERCIAL

## 2020-09-10 DIAGNOSIS — L98.0 PYOGENIC GRANULOMA: ICD-10-CM

## 2020-09-10 DIAGNOSIS — L40.9 PSORIASIS: ICD-10-CM

## 2020-09-10 DIAGNOSIS — Z51.81 MEDICATION MONITORING ENCOUNTER: Primary | ICD-10-CM

## 2020-09-10 PROCEDURE — 99213 OFFICE O/P EST LOW 20 MIN: CPT | Mod: 95 | Performed by: DERMATOLOGY

## 2020-09-10 RX ORDER — CLOBETASOL PROPIONATE 0.5 MG/ML
SOLUTION TOPICAL
Qty: 50 ML | Refills: 3 | Status: SHIPPED | OUTPATIENT
Start: 2020-09-10 | End: 2021-12-17

## 2020-09-10 RX ORDER — CLOBETASOL PROPIONATE 0.5 MG/G
OINTMENT TOPICAL
Qty: 60 G | Refills: 4 | Status: SHIPPED | OUTPATIENT
Start: 2020-09-10 | End: 2021-12-17

## 2020-09-10 RX ORDER — USTEKINUMAB 90 MG/ML
90 INJECTION, SOLUTION SUBCUTANEOUS
Qty: 1 ML | Refills: 3 | Status: SHIPPED | OUTPATIENT
Start: 2020-09-10 | End: 2021-06-18

## 2020-09-10 ASSESSMENT — PAIN SCALES - GENERAL: PAINLEVEL: NO PAIN (0)

## 2020-09-10 NOTE — LETTER
"    9/10/2020         RE: To White  [address] [facility] [address]        Dear Colleague,    Thank you for referring your patient, To White, to the [facility]. Please see a copy of my visit note below.    [facility] Dermatology Record:  Store and Forward and Telephone [phone]       Dermatology Problem List:  1.Plaque psoriasis: 1-3% TBSA  -Current Tx: Stelara, triamcinolone 0.1% ointment, desonide 0.05% ointment, calcipotriene 0.005% ointment, Clobetasol  Past tx: Soriatane 25 mg daily stopped due to pyogenic granulomas 6/2018, Humira  2. Onycholysis of distal right toenails, May be in association with psoriasis.    Encounter Date: Sep 10, 2020    CC:   Chief Complaint   Patient presents with     Derm Problem     plaque psoriasis, full body skin check.     (Patient attempted to send photos, stating that his wife wasn't home to help him take the photos so he did really tried.)  Patient voices no areas of concern, just wanted a full body skin check exam.   He pointed out a spot on the left torso that he's had for many years from history of plaque psoriasis.   \"Everything else cleared, except that spot it never went away\" per patient.  Patient reported that at times the spot would itch and peel off.   He had been applying clobetasol and triamcinolone cream to the spot, PRN.         History of Present Illness:  To White is a 50 year old male who presents for follow up of psoriais. Reports doing well. One active lesion only on the elft hip. Intermittnet cream use. Face, groin and genital clear. NO recent illnesses. Staying at home mostly. Plans to get flue shot      ROS: Patient is generally feeling well today      Physical Examination:  General: Well-appearing , appropriately-developed individual.  Skin: Focused examination including back(not focused and reviewed with patient not assessable for lesions) was performed.   -extremities as photographed and also one scaly patch left " hip  Labs:  Component      Latest Ref Rng & Units 9/4/2020   WBC      4.0 - 11.0 10e9/L 12.2 (H)   RBC Count      4.4 - 5.9 10e12/L 4.53   Hemoglobin      13.3 - 17.7 g/dL 13.9   Hematocrit      40.0 - 53.0 % 41.6   MCV      78 - 100 fl 92   MCH      26.5 - 33.0 pg 30.7   MCHC      31.5 - 36.5 g/dL 33.4   RDW      10.0 - 15.0 % 13.3   Platelet Count      150 - 450 10e9/L 327   % Neutrophils      % 52.9   % Lymphocytes      % 33.7   % Monocytes      % 7.6   % Eosinophils      % 5.3   % Basophils      % 0.5   Absolute Neutrophil      1.6 - 8.3 10e9/L 6.4   Absolute Lymphocytes      0.8 - 5.3 10e9/L 4.1   Absolute Monocytes      0.0 - 1.3 10e9/L 0.9   Absolute Eosinophils      0.0 - 0.7 10e9/L 0.7   Absolute Basophils      0.0 - 0.2 10e9/L 0.1   Diff Method       Automated Method   Sodium      133 - 144 mmol/L 138   Potassium      3.4 - 5.3 mmol/L 4.6   Chloride      94 - 109 mmol/L 105   Carbon Dioxide      20 - 32 mmol/L 27   Anion Gap      3 - 14 mmol/L 6   Glucose      70 - 99 mg/dL 97   Urea Nitrogen      7 - 30 mg/dL 15   Creatinine      0.66 - 1.25 mg/dL 1.11   GFR Estimate      >60 mL/min/1.73:m2 77   GFR Estimate If Black      >60 mL/min/1.73:m2 90   Calcium      8.5 - 10.1 mg/dL 9.2   Bilirubin Total      0.2 - 1.3 mg/dL 0.4   Albumin      3.4 - 5.0 g/dL 4.0   Protein Total      6.8 - 8.8 g/dL 8.0   Alkaline Phosphatase      40 - 150 U/L 92   ALT      0 - 70 U/L 21   AST      0 - 45 U/L 13   MTB Quantiferon Result      NEG:Negative Negative   TB1 Ag minus Nil      IU/mL 0.00   TB2 Ag minus Nil      IU/mL 0.01   Mitogen minus Nil      IU/mL 9.97   Nil Result      IU/mL 0.03       Past Medical History:   Patient Active Problem List   Diagnosis     Other psoriasis     Obesity     Essential hypertension with goal blood pressure less than 140/90     Obesity, Class I, BMI 30-34.9     Benign cyst of both testicles     Past Medical History:   Diagnosis Date     Essential hypertension, benign 2000     Hypertension       Obesity      Other psoriasis     since childhood     Past Surgical History:   Procedure Laterality Date     CHOLECYSTECTOMY       LAPAROSCOPIC CHOLECYSTECTOMY  7/3/2012    Procedure: LAPAROSCOPIC CHOLECYSTECTOMY;  Laparoscopic Cholecystectomy;  Surgeon: Kulwinder Hollins MD;  Location: [facility]     NO HISTORY OF SURGERY         Social History:  Patient reports that he has never smoked. He has never used smokeless tobacco. He reports current alcohol use. He reports that he does not use drugs.    Family History:  Family History   Problem Relation Age of Onset     Hypertension Father      Cerebrovascular Disease Maternal Grandmother      Cancer - colorectal Maternal Uncle         dx age 45,  before age 50     Gastrointestinal Disease Mother         benign colon polyps age over 50     Diabetes No family hx of      Prostate Cancer No family hx of        Medications:  Current Outpatient Medications   Medication     amLODIPine (NORVASC) 10 MG tablet     benazepril (LOTENSIN) 40 MG tablet     calcipotriene (DOVONOX) 0.005 % cream     calcipotriene 0.005 % OINT     clobetasol (TEMOVATE) 0.05 % external ointment     clobetasol (TEMOVATE) 0.05 % external solution     desonide (DESOWEN) 0.05 % ointment     Fexofenadine HCl (ALLEGRA PO)     hydrOXYzine (ATARAX) 25 MG tablet     Pseudoephedrine-Ibuprofen (ADVIL COLD/SINUS PO)     triamcinolone (KENALOG) 0.1 % external ointment     ustekinumab (STELARA) 90 MG/ML     No current facility-administered medications for this visit.           Allergies   Allergen Reactions     No Known Allergies            Impression and Recommendations (Patient Counseled on the Following):  1. Plaque psoriasis - nearly entirely clear. No active plaques. No genital involvement, some joint pain, unclear is psoriasis, on stelara, no side effects    Labsreviewed and normal today: AST, ALT, CBC with platelets, Quantiferon TB Gold Plus    Continue Stelara. Hold drug if sick. Plan to get flu shot this  fall.REVIEWED    Clobetasol cream for body and soln for scalp for spot treatments twice daily for 2 weeks then weekends only for 2 weeks, repeat cycle as needed.     Holding triamcinolone and dovonex. No longer active    Hold desonide, not active on face or groin    Referral rheumatology for joint pain-pending being seen in person, see rheum reccords    Hold drug if fillin ill    He would like to continue stelara despite unknown data with corona virus      # Considered to be at high risk of complications from the COVID-19 virus.  It is recommended to limit contact with other people and if possible to work remotely or provide a leave of absence to reduce the risk for COVID-19.      2. Elevated WBC count, he understands he must schedule this and I will result to him  -6 weeks recheck, no symptoms of illness at this time    Follow-up:   Follow-up with dermatology in approximately 6 weeks for labs(in [place]) and in 9months photos and phone. Earlier for new or changing lesions or rash. Declines earlier follow up . Call if psoriasi lesion does not resolve with clobetasol     Staff only    Jacey Cervantes MD    Department of Dermatology  [facility]: Phone: [phone], Fax:[phone]  [facility]: Phone: [phone], Fax: [phone]      _____________________________________________________________________________    Teledermatology information:  - Location of patient: [place]  - Patient presented as: return  - Location of teledermatologist:  ([facility] )  - Reason teledermatology is appropriate:  of National Emergency Regarding Coronavirus disease (COVID 19) Outbreak  - Image quality and interpretability: acceptable  - Physician has received verbal consent for a Video/Photos Visit from the patient? Yes  - In-person dermatology visit recommendation: no  - Date of  "images:9/20/3030  - Service start time:3:44pm  - Service end time:3:51pm  - Date of report: 9/10/2020         Teledermatology Nurse Call for RETURN patients seen within the last 3 years:      The patient was contacted by phone and we reviewed they have a visit in teledermatology upcoming with an MD or CAMELIA;  Importantly, \"a teledermatology visit may not be as thorough as an in-person visit, and the quality of the photograph and/or video sent may not be of the same quality as that taken by the dermatology clinic.\"     We have found that certain health care needs can be provided without the need for an in-person physical exam.   If a prescription is necessary we can send it directly to your pharmacy.  If lab work is needed we can place an order for that and you can then stop by our lab to have the test done at a later time.Visits are billed at different rates depending on your insurance coverage. Please reach out to your insurance provider with any questions.    The patient chose to:                                                                                                                                                                                                                 Consent to a teledermatology visit with [email or web address] photos. Patient told by nursing these are already uploaded. Instructions sent to patient via [email or web address]. They verified they will be in the state of [place] at the time of the encounter.                                                                                                                                                                                                                                     The patient denied skin pain, fever, mucosal symptoms(lesions, blisters, sores in the mouth, nose, eyes, or genitals)  IF PATIENT ENDORSES ANY OF THESE STOP AND PAGE ON CALL ATTENDING                                                                                                            "                                                                                                                      Again, thank you for allowing me to participate in the care of your patient.        Sincerely,        Jacey Cervantes MD

## 2020-09-10 NOTE — PROGRESS NOTES
"[facility] Record:  Store and Forward and Telephone [phone]       Dermatology Problem List:  1.Plaque psoriasis: 1-3% TBSA  -Current Tx: Stelara, triamcinolone 0.1% ointment, desonide 0.05% ointment, calcipotriene 0.005% ointment, Clobetasol  Past tx: Soriatane 25 mg daily stopped due to pyogenic granulomas 6/2018, Humira  2. Onycholysis of distal right toenails, May be in association with psoriasis.    Encounter Date: Sep 10, 2020    CC:   Chief Complaint   Patient presents with     Derm Problem     plaque psoriasis, full body skin check.     (Patient attempted to send photos, stating that his wife wasn't home to help him take the photos so he did really tried.)  Patient voices no areas of concern, just wanted a full body skin check exam.   He pointed out a spot on the left torso that he's had for many years from history of plaque psoriasis.   \"Everything else cleared, except that spot it never went away\" per patient.  Patient reported that at times the spot would itch and peel off.   He had been applying clobetasol and triamcinolone cream to the spot, PRN.         History of Present Illness:  To White is a 50 year old male who presents for follow up of psoriais. Reports doing well. One active lesion only on the elft hip. Intermittnet cream use. Face, groin and genital clear. NO recent illnesses. Staying at home mostly. Plans to get flue shot      ROS: Patient is generally feeling well today      Physical Examination:  General: Well-appearing , appropriately-developed individual.  Skin: Focused examination including back(not focused and reviewed with patient not assessable for lesions) was performed.   -extremities as photographed and also one scaly patch left hip  Labs:  Component      Latest Ref Rng & Units 9/4/2020   WBC      4.0 - 11.0 10e9/L 12.2 (H)   RBC Count      4.4 - 5.9 10e12/L 4.53   Hemoglobin      13.3 - 17.7 g/dL 13.9   Hematocrit      40.0 - 53.0 % 41.6   MCV      78 - 100 fl 92 "   MCH      26.5 - 33.0 pg 30.7   MCHC      31.5 - 36.5 g/dL 33.4   RDW      10.0 - 15.0 % 13.3   Platelet Count      150 - 450 10e9/L 327   % Neutrophils      % 52.9   % Lymphocytes      % 33.7   % Monocytes      % 7.6   % Eosinophils      % 5.3   % Basophils      % 0.5   Absolute Neutrophil      1.6 - 8.3 10e9/L 6.4   Absolute Lymphocytes      0.8 - 5.3 10e9/L 4.1   Absolute Monocytes      0.0 - 1.3 10e9/L 0.9   Absolute Eosinophils      0.0 - 0.7 10e9/L 0.7   Absolute Basophils      0.0 - 0.2 10e9/L 0.1   Diff Method       Automated Method   Sodium      133 - 144 mmol/L 138   Potassium      3.4 - 5.3 mmol/L 4.6   Chloride      94 - 109 mmol/L 105   Carbon Dioxide      20 - 32 mmol/L 27   Anion Gap      3 - 14 mmol/L 6   Glucose      70 - 99 mg/dL 97   Urea Nitrogen      7 - 30 mg/dL 15   Creatinine      0.66 - 1.25 mg/dL 1.11   GFR Estimate      >60 mL/min/1.73:m2 77   GFR Estimate If Black      >60 mL/min/1.73:m2 90   Calcium      8.5 - 10.1 mg/dL 9.2   Bilirubin Total      0.2 - 1.3 mg/dL 0.4   Albumin      3.4 - 5.0 g/dL 4.0   Protein Total      6.8 - 8.8 g/dL 8.0   Alkaline Phosphatase      40 - 150 U/L 92   ALT      0 - 70 U/L 21   AST      0 - 45 U/L 13   MTB Quantiferon Result      NEG:Negative Negative   TB1 Ag minus Nil      IU/mL 0.00   TB2 Ag minus Nil      IU/mL 0.01   Mitogen minus Nil      IU/mL 9.97   Nil Result      IU/mL 0.03       Past Medical History:   Patient Active Problem List   Diagnosis     Other psoriasis     Obesity     Essential hypertension with goal blood pressure less than 140/90     Obesity, Class I, BMI 30-34.9     Benign cyst of both testicles     Past Medical History:   Diagnosis Date     Essential hypertension, benign 2000     Hypertension      Obesity      Other psoriasis     since childhood     Past Surgical History:   Procedure Laterality Date     CHOLECYSTECTOMY       LAPAROSCOPIC CHOLECYSTECTOMY  7/3/2012    Procedure: LAPAROSCOPIC CHOLECYSTECTOMY;  Laparoscopic  Cholecystectomy;  Surgeon: Kulwinder Hollins MD;  Location: [facility]     NO HISTORY OF SURGERY         Social History:  Patient reports that he has never smoked. He has never used smokeless tobacco. He reports current alcohol use. He reports that he does not use drugs.    Family History:  Family History   Problem Relation Age of Onset     Hypertension Father      Cerebrovascular Disease Maternal Grandmother      Cancer - colorectal Maternal Uncle         dx age 45,  before age 50     Gastrointestinal Disease Mother         benign colon polyps age over 50     Diabetes No family hx of      Prostate Cancer No family hx of        Medications:  Current Outpatient Medications   Medication     amLODIPine (NORVASC) 10 MG tablet     benazepril (LOTENSIN) 40 MG tablet     calcipotriene (DOVONOX) 0.005 % cream     calcipotriene 0.005 % OINT     clobetasol (TEMOVATE) 0.05 % external ointment     clobetasol (TEMOVATE) 0.05 % external solution     desonide (DESOWEN) 0.05 % ointment     Fexofenadine HCl (ALLEGRA PO)     hydrOXYzine (ATARAX) 25 MG tablet     Pseudoephedrine-Ibuprofen (ADVIL COLD/SINUS PO)     triamcinolone (KENALOG) 0.1 % external ointment     ustekinumab (STELARA) 90 MG/ML     No current facility-administered medications for this visit.           Allergies   Allergen Reactions     No Known Allergies            Impression and Recommendations (Patient Counseled on the Following):  1. Plaque psoriasis - nearly entirely clear. No active plaques. No genital involvement, some joint pain, unclear is psoriasis, on stelara, no side effects    Labsreviewed and normal today: AST, ALT, CBC with platelets, Quantiferon TB Gold Plus    Continue Stelara. Hold drug if sick. Plan to get flu shot this fall.REVIEWED    Clobetasol cream for body and soln for scalp for spot treatments twice daily for 2 weeks then weekends only for 2 weeks, repeat cycle as needed.     Holding triamcinolone and dovonex. No longer active    Hold  desonide, not active on face or groin    Referral rheumatology for joint pain-pending being seen in person, see rheum reccords    Hold drug if fillin ill    He would like to continue stelara despite unknown data with corona virus      # Considered to be at high risk of complications from the COVID-19 virus.  It is recommended to limit contact with other people and if possible to work remotely or provide a leave of absence to reduce the risk for COVID-19.      2. Elevated WBC count, he understands he must schedule this and I will result to him  -6 weeks recheck, no symptoms of illness at this time    Follow-up:   Follow-up with dermatology in approximately 6 weeks for labs(in [place]) and in 9months photos and phone. Earlier for new or changing lesions or rash. Declines earlier follow up . Call if psoriasi lesion does not resolve with clobetasol     Staff only    Jacey Cervantes MD    Department of Dermatology  [facility]: Phone: [phone], Fax:[phone]  [facility]: Phone: [phone], Fax: [phone]      _____________________________________________________________________________    Teledermatology information:  - Location of patient: [place]  - Patient presented as: return  - Location of teledermatologist:  ([facility] )  - Reason teledermatology is appropriate:  of National Emergency Regarding Coronavirus disease (COVID 19) Outbreak  - Image quality and interpretability: acceptable  - Physician has received verbal consent for a Video/Photos Visit from the patient? Yes  - In-person dermatology visit recommendation: no  - Date of images:9/20/3030  - Service start time:3:44pm  - Service end time:3:51pm  - Date of report: 9/10/2020         Teledermatology Nurse Call for RETURN patients seen within the last 3 years:      The patient was contacted by phone and we  "reviewed they have a visit in teledermatology upcoming with an MD or CAMELIA;  Importantly, \"a teledermatology visit may not be as thorough as an in-person visit, and the quality of the photograph and/or video sent may not be of the same quality as that taken by the dermatology clinic.\"     We have found that certain health care needs can be provided without the need for an in-person physical exam.   If a prescription is necessary we can send it directly to your pharmacy.  If lab work is needed we can place an order for that and you can then stop by our lab to have the test done at a later time.Visits are billed at different rates depending on your insurance coverage. Please reach out to your insurance provider with any questions.    The patient chose to:                                                                                                                                                                                                                 Consent to a teledermatology visit with Memeot photos. Patient told by nursing these are already uploaded. Instructions sent to patient via [employer]. They verified they will be in the state of [place] at the time of the encounter.                                                                                                                                                                                                                                     The patient denied skin pain, fever, mucosal symptoms(lesions, blisters, sores in the mouth, nose, eyes, or genitals)  IF PATIENT ENDORSES ANY OF THESE STOP AND PAGE ON CALL ATTENDING                                                                                                                                                                                                                               "

## 2020-09-10 NOTE — PATIENT INSTRUCTIONS
[facility] Dermatology Visit    Thank you for allowing us to participate in your care.  6 weeks recheck of labs  Call if psoriasis spots do not go away with clobetasol  When should I call my doctor?    If you are worsening or not improving, please, contact us or seek urgent care as noted below.     Who should I call with questions (adults)?    [facility] (adult and pediatric): [phone]     [facility] (adult): [phone]    For urgent needs outside of business hours call the [facility] at [phone] and ask for the dermatology resident on call    If this is a medical emergency and you are unable to reach an ER, Call 911      Who should I call with questions (pediatric)?  [facility]- Pediatric Dermatology  Dr. Torri Zimmerman, Dr. Geoff Sifuentes, Dr. Shruthi Ghotra, Ivelisse Hamilton, PA  Dr. Divine Florence, Dr. Aimee Gunn & Dr. Jeremias Gaston  Non Urgent  Nurse Triage Line; [phone]- Kasie and Purvi ORTIZ Care Coordinators   Krysta (/Complex ) [phone]    If you need a prescription refill, please contact your pharmacy. Refills are approved or denied by our Physicians during normal business hours, Monday through Fridays  Per office policy, refills will not be granted if you have not been seen within the past year (or sooner depending on your child's condition)    Scheduling Information:  Pediatric Appointment Scheduling and Call Center [phone]  Radiology Scheduling- [phone]  Sedation Unit Scheduling- [phone]  [facility] Scheduling- General [phone]; Pediatric Dermatology [phone]  Main  Services: [phone]  Maltese: [phone]  Tuvaluan: [phone]  Hmong/Roldan/Turkish: [phone]  Preadmission Nursing Department Fax Number: [phone] (Fax all pre-operative paperwork to this number)    For urgent matters arising during  evenings, weekends, or holidays that cannot wait for normal business hours please call [phone] and ask for the Dermatology Resident On-Call to be paged.

## 2020-10-06 ENCOUNTER — IMMUNIZATION (OUTPATIENT)
Dept: FAMILY MEDICINE | Facility: OTHER | Age: 50
End: 2020-10-06
Payer: COMMERCIAL

## 2020-10-06 DIAGNOSIS — Z23 NEED FOR PROPHYLACTIC VACCINATION AND INOCULATION AGAINST INFLUENZA: Primary | ICD-10-CM

## 2020-10-06 PROCEDURE — 90686 IIV4 VACC NO PRSV 0.5 ML IM: CPT

## 2020-10-06 PROCEDURE — 90471 IMMUNIZATION ADMIN: CPT

## 2020-10-06 NOTE — PROGRESS NOTES
Patient consents to receive outdoor care: Yes    Upon arrival, patient instructed to proceed to designated location, place vehicle in park, turn off, and remove keys  and Patient receiving an immunization or injection. Instructed patient to notify healthcare personnel if they are having an adverse reaction.    If we are unable to safely and ergonomically able to provide care- is the patient able to safely able to get out of car and transfer to a chair? Yes    Patient would like to receive their AVS via MyChart.    Patient received influenza vaccination today. See immunization tab for complete administration details.     Mariza Vargas MA

## 2020-10-23 DIAGNOSIS — Z51.81 MEDICATION MONITORING ENCOUNTER: ICD-10-CM

## 2020-10-23 LAB
BASOPHILS # BLD AUTO: 0.1 10E9/L (ref 0–0.2)
BASOPHILS NFR BLD AUTO: 0.6 %
DIFFERENTIAL METHOD BLD: ABNORMAL
EOSINOPHIL # BLD AUTO: 0.6 10E9/L (ref 0–0.7)
EOSINOPHIL NFR BLD AUTO: 4.9 %
ERYTHROCYTE [DISTWIDTH] IN BLOOD BY AUTOMATED COUNT: 13.7 % (ref 10–15)
HCT VFR BLD AUTO: 43.3 % (ref 40–53)
HGB BLD-MCNC: 14.7 G/DL (ref 13.3–17.7)
LYMPHOCYTES # BLD AUTO: 5 10E9/L (ref 0.8–5.3)
LYMPHOCYTES NFR BLD AUTO: 40.1 %
MCH RBC QN AUTO: 30.8 PG (ref 26.5–33)
MCHC RBC AUTO-ENTMCNC: 33.9 G/DL (ref 31.5–36.5)
MCV RBC AUTO: 91 FL (ref 78–100)
MONOCYTES # BLD AUTO: 1 10E9/L (ref 0–1.3)
MONOCYTES NFR BLD AUTO: 8.3 %
NEUTROPHILS # BLD AUTO: 5.8 10E9/L (ref 1.6–8.3)
NEUTROPHILS NFR BLD AUTO: 46.1 %
PLATELET # BLD AUTO: 359 10E9/L (ref 150–450)
RBC # BLD AUTO: 4.77 10E12/L (ref 4.4–5.9)
WBC # BLD AUTO: 12.6 10E9/L (ref 4–11)

## 2020-10-23 PROCEDURE — 36415 COLL VENOUS BLD VENIPUNCTURE: CPT | Performed by: DERMATOLOGY

## 2020-10-23 PROCEDURE — 85025 COMPLETE CBC W/AUTO DIFF WBC: CPT | Performed by: DERMATOLOGY

## 2020-11-03 ENCOUNTER — TELEPHONE (OUTPATIENT)
Dept: DERMATOLOGY | Facility: CLINIC | Age: 50
End: 2020-11-03

## 2020-11-03 DIAGNOSIS — Z51.81 MEDICATION MONITORING ENCOUNTER: Primary | ICD-10-CM

## 2020-11-03 NOTE — TELEPHONE ENCOUNTER
Re Veronica LPN   11/3/2020  2:20 PM      Spoke to patient regarding results. Scheduled with Dr. Cervantes in January, 2021. No further questions at this time.  VINITA Perez Ronda, MD   11/3/2020 12:49 PM      Place order for repeat cbc with diff in 8 weeks as future for patient. Schedule phone visit with me in 9 weeks

## 2020-11-03 NOTE — TELEPHONE ENCOUNTER
----- Message from Jacey Cervantes MD sent at 11/3/2020 12:49 PM CST -----  Place order for repeat cbc with diff in 8 weeks as future for patient. Schedule phone visit with me in 9 weeks

## 2020-11-27 ENCOUNTER — TELEPHONE (OUTPATIENT)
Dept: DERMATOLOGY | Facility: CLINIC | Age: 50
End: 2020-11-27

## 2020-11-27 NOTE — TELEPHONE ENCOUNTER
PA Initiation    Medication: Stelara  Insurance Company: [employer] ([facility]) - Phone [phone] Fax [phone]  Pharmacy Filling the Rx: [facility] - [place] [address]   Filling Pharmacy Phone:    Filling Pharmacy Fax:    Start Date: 11/27/2020    To White (Deleon: [id number])

## 2020-11-27 NOTE — TELEPHONE ENCOUNTER
Faxed received from [employer] stating a prior auth is needed for Stelara 90mg/mL syringes.    Forwarding to pharmacy liaisons for review.  Mariia Velasco RN

## 2020-11-30 NOTE — TELEPHONE ENCOUNTER
Prior Authorization Approval    Authorization Effective Date: 11/27/2020  Authorization Expiration Date: 11/27/2021  Medication: Stelara renewal approved  Approved Dose/Quantity: 1/84ds  Reference #:     Insurance Company: [employer] ([facility]) - Phone [phone] Fax [phone]  Expected CoPay:       CoPay Card Available:      Foundation Assistance Needed:    Which Pharmacy is filling the prescription (Not needed for infusion/clinic administered): [facility] - [facility] [address]   Pharmacy Notified: No  Patient Notified: No

## 2021-01-04 DIAGNOSIS — Z51.81 MEDICATION MONITORING ENCOUNTER: ICD-10-CM

## 2021-01-04 LAB
BASOPHILS # BLD AUTO: 0.1 10E9/L (ref 0–0.2)
BASOPHILS NFR BLD AUTO: 0.5 %
DIFFERENTIAL METHOD BLD: ABNORMAL
EOSINOPHIL # BLD AUTO: 0.7 10E9/L (ref 0–0.7)
EOSINOPHIL NFR BLD AUTO: 4.3 %
ERYTHROCYTE [DISTWIDTH] IN BLOOD BY AUTOMATED COUNT: 13.5 % (ref 10–15)
HCT VFR BLD AUTO: 43.8 % (ref 40–53)
HGB BLD-MCNC: 14.5 G/DL (ref 13.3–17.7)
LYMPHOCYTES # BLD AUTO: 5.3 10E9/L (ref 0.8–5.3)
LYMPHOCYTES NFR BLD AUTO: 35 %
MCH RBC QN AUTO: 30.7 PG (ref 26.5–33)
MCHC RBC AUTO-ENTMCNC: 33.1 G/DL (ref 31.5–36.5)
MCV RBC AUTO: 93 FL (ref 78–100)
MONOCYTES # BLD AUTO: 1.1 10E9/L (ref 0–1.3)
MONOCYTES NFR BLD AUTO: 7.3 %
NEUTROPHILS # BLD AUTO: 8 10E9/L (ref 1.6–8.3)
NEUTROPHILS NFR BLD AUTO: 52.9 %
PLATELET # BLD AUTO: 359 10E9/L (ref 150–450)
RBC # BLD AUTO: 4.72 10E12/L (ref 4.4–5.9)
WBC # BLD AUTO: 15.2 10E9/L (ref 4–11)

## 2021-01-04 PROCEDURE — 36415 COLL VENOUS BLD VENIPUNCTURE: CPT | Performed by: DERMATOLOGY

## 2021-01-04 PROCEDURE — 85025 COMPLETE CBC W/AUTO DIFF WBC: CPT | Performed by: DERMATOLOGY

## 2021-01-07 ENCOUNTER — VIRTUAL VISIT (OUTPATIENT)
Dept: DERMATOLOGY | Facility: CLINIC | Age: 51
End: 2021-01-07
Payer: COMMERCIAL

## 2021-01-07 DIAGNOSIS — L40.9 PSORIASIS: ICD-10-CM

## 2021-01-07 DIAGNOSIS — L98.0 PYOGENIC GRANULOMA: ICD-10-CM

## 2021-01-07 DIAGNOSIS — L40.0 PLAQUE PSORIASIS: ICD-10-CM

## 2021-01-07 DIAGNOSIS — D72.829 LEUKOCYTOSIS, UNSPECIFIED TYPE: Primary | ICD-10-CM

## 2021-01-07 PROCEDURE — 99213 OFFICE O/P EST LOW 20 MIN: CPT | Mod: 95 | Performed by: DERMATOLOGY

## 2021-01-07 NOTE — PROGRESS NOTES
[facility] Dermatology Note: Jan 7, 2021 Store and Forward and Telephone [phone]. Staff phone time: start 4:43pm and end 4:47pm.        Dermatology Problem List:  1.Plaque psoriasis: 1-3% TBSA  -Current Tx: Stelara, triamcinolone 0.1% ointment, desonide 0.05% ointment, calcipotriene 0.005% ointment, Clobetasol  Past tx: Soriatane 25 mg daily stopped due to pyogenic granulomas 6/2018, Humira  Quant gold 9/4/2020  2. Onycholysis of distal right toenails, May be in association with psoriasis.         ASSESSMENT/PLAN:  # Plaque psoriasis - chronic and stable.  Has hx of joint pain. On Stelara and doing well with 1 active area in photos and he self reports some on the back    CBC and last quant gold reviewed.     Clobetasol cream BID for up to 2 weeks in arow     Okay to use dovonex    Hold desonide, not active on face or groin. OKay to use when active    He is still pending rheumatology evaluation    Hold stelara if you become illl             2. Elevated WBC count  -sent referral to oncology for scheduling. Off and onna for at least 1 year    3. Joint pain, chronic still active  -pending rheum valuation    4. In person skin exam for non healing area not responding to steroids on left abdomen  -in person exam  Procedures Performed:   None    Follow-up: within 4 week(s), in-person, earlier for new or changing lesions     ______________________________________________________________      CC: Psoriasis      HPI:  Mr. To White is a(n) 50 year old male  who presents to clinic today as a return patient for psoriasis. The patient reports he is doing okay. Has had 1 area with flare sent photos of. He is on stelara and topical steroids.     No recent illnesses. No hospitalizations      Patient is otherwise feeling well, without additional concerns.    Still has joint pain and scheduling rheum visit         Labs/Imaging:  N/A    Physical exam:  Vitals: There were no vitals taken for this  visit.  SKIN: Telemedicine photographs reviewed (Image quality and interpretability: acceptable/upload date 1/4/2020, Location of teledermatologist: [facility]):  - erythematous circular patch on the abdomen, possibly left side  - No other lesions of concern on areas examined.     Medications:  Current Outpatient Medications   Medication     amLODIPine (NORVASC) 10 MG tablet     benazepril (LOTENSIN) 40 MG tablet     calcipotriene 0.005 % OINT     clobetasol (TEMOVATE) 0.05 % external ointment     clobetasol (TEMOVATE) 0.05 % external solution     Fexofenadine HCl (ALLEGRA PO)     hydrOXYzine (ATARAX) 25 MG tablet     Pseudoephedrine-Ibuprofen (ADVIL COLD/SINUS PO)     ustekinumab (STELARA) 90 MG/ML     No current facility-administered medications for this visit.       Past Medical/Surgical History:   Patient Active Problem List   Diagnosis     Other psoriasis     Obesity     Essential hypertension with goal blood pressure less than 140/90     Obesity, Class I, BMI 30-34.9     Benign cyst of both testicles     Past Medical History:   Diagnosis Date     Essential hypertension, benign 2000     Hypertension      Obesity      Other psoriasis     since childhood     Past Surgical History:   Procedure Laterality Date     CHOLECYSTECTOMY       LAPAROSCOPIC CHOLECYSTECTOMY  7/3/2012     NO HISTORY OF SURGERY            CC No referring provider defined for this encounter. on close of this encounter.    Provider Time: 15 minutes spent on the date of the encounter doing chart review, review of test results, interpretation of tests, patient visit (including history, exam, and counseling) and documentation.  Staff:   Jacey Cervantes MD    Department of Dermatology  [facility]: Phone: [phone], Fax:[phone]  [facility]: Phone: [phone], Fax: [phone]          Teledermatology Nurse  "Call Patients:     Are you  in the [facility] at the time of the encounter? yes    Today's visit will be billed to you and your insurance.    A teledermatology visit is not as thorough as an in-person visit and the quality of the photograph sent may not be of the same quality as that taken by the dermatology clinic.       - Reports that psoriasis is \"okay.\" Has a bit of a flare up but not horrible. He sent a photo of the area that needs to be monitored. No further questions at this time.  Re Veronica LPN                                                                                                                                                                                                                             "

## 2021-01-07 NOTE — PATIENT INSTRUCTIONS
[facility] Dermatology Visit    Thank you for allowing us to participate in your care. Your findings, instructions and follow-up plan are as follows:         When should I call my doctor?    If you are worsening or not improving, please, contact us or seek urgent care as noted below.     Who should I call with questions (adults)?    [facility] (adult and pediatric): [phone]     [facility] (adult): [phone]    For urgent needs outside of business hours call the [facility] at [phone] and ask for the dermatology resident on call    If this is a medical emergency and you are unable to reach an ER, Call 911      Who should I call with questions (pediatric)?  [facility]- Pediatric Dermatology  Dr. Torri Zimmerman, Dr. Geoff Sifuentes, Dr. Shruthi Ghotra, Ivelisse Hamilton, PA  Dr. Divine Florence, Dr. Aimee Gunn & Dr. Jeremias Gaston  Non Urgent  Nurse Triage Line; [phone]- Kasie and Purvi RN Care Coordinators   Krysta (/Complex ) [phone]    If you need a prescription refill, please contact your pharmacy. Refills are approved or denied by our Physicians during normal business hours, Monday through Fridays  Per office policy, refills will not be granted if you have not been seen within the past year (or sooner depending on your child's condition)    Scheduling Information:  Pediatric Appointment Scheduling and Call Center [phone]  Radiology Scheduling- [phone]  Sedation Unit Scheduling- [phone]  [place] Scheduling- General [phone]; Pediatric Dermatology [phone]  Main  Services: [phone]  Azeri: [phone]  Beninese: [phone]  Hmong/Georgian/Setswana: [phone]  Preadmission Nursing Department Fax Number: [phone] (Fax all pre-operative paperwork to this number)    For urgent matters arising during evenings,  weekends, or holidays that cannot wait for normal business hours please call [phone] and ask for the Dermatology Resident On-Call to be paged.

## 2021-01-07 NOTE — LETTER
1/7/2021         RE: To White  [address] [facility] [address]        Dear Colleague,    Thank you for referring your patient, To White, to the [facility]. Please see a copy of my visit note below.    [facility] Dermatology Note: Jan 7, 2021 Store and Forward and Telephone [phone]. Staff phone time: start 4:43pm and end 4:47pm.        Dermatology Problem List:  1.Plaque psoriasis: 1-3% TBSA  -Current Tx: Stelara, triamcinolone 0.1% ointment, desonide 0.05% ointment, calcipotriene 0.005% ointment, Clobetasol  Past tx: Soriatane 25 mg daily stopped due to pyogenic granulomas 6/2018, Humira  Quant gold 9/4/2020  2. Onycholysis of distal right toenails, May be in association with psoriasis.         ASSESSMENT/PLAN:  # Plaque psoriasis - chronic and stable.  Has hx of joint pain. On Stelara and doing well with 1 active area in photos and he self reports some on the back    CBC and last quant gold reviewed.     Clobetasol cream BID for up to 2 weeks in arow     Okay to use dovonex    Hold desonide, not active on face or groin. OKay to use when active    He is still pending rheumatology evaluation    Hold stelara if you become illl             2. Elevated WBC count  -sent referral to oncology for scheduling. Off and onna for at least 1 year    3. Joint pain, chronic still active  -pending rheum valuation    4. In person skin exam for non healing area not responding to steroids on left abdomen  -in person exam  Procedures Performed:   None    Follow-up: within 4 week(s), in-person, earlier for new or changing lesions     ______________________________________________________________      CC: Psoriasis      HPI:  Mr. To White is a(n) 50 year old male  who presents to clinic today as a return patient for psoriasis. The patient reports he is doing okay. Has had 1 area with flare sent photos of. He is on stelara and topical steroids.     No recent  illnesses. No hospitalizations      Patient is otherwise feeling well, without additional concerns.    Still has joint pain and scheduling rheum visit         Labs/Imaging:  N/A    Physical exam:  Vitals: There were no vitals taken for this visit.  SKIN: Telemedicine photographs reviewed (Image quality and interpretability: acceptable/upload date 1/4/2020, Location of teledermatologist: [facility]):  - erythematous circular patch on the abdomen, possibly left side  - No other lesions of concern on areas examined.     Medications:  Current Outpatient Medications   Medication     amLODIPine (NORVASC) 10 MG tablet     benazepril (LOTENSIN) 40 MG tablet     calcipotriene 0.005 % OINT     clobetasol (TEMOVATE) 0.05 % external ointment     clobetasol (TEMOVATE) 0.05 % external solution     Fexofenadine HCl (ALLEGRA PO)     hydrOXYzine (ATARAX) 25 MG tablet     Pseudoephedrine-Ibuprofen (ADVIL COLD/SINUS PO)     ustekinumab (STELARA) 90 MG/ML     No current facility-administered medications for this visit.       Past Medical/Surgical History:   Patient Active Problem List   Diagnosis     Other psoriasis     Obesity     Essential hypertension with goal blood pressure less than 140/90     Obesity, Class I, BMI 30-34.9     Benign cyst of both testicles     Past Medical History:   Diagnosis Date     Essential hypertension, benign 2000     Hypertension      Obesity      Other psoriasis     since childhood     Past Surgical History:   Procedure Laterality Date     CHOLECYSTECTOMY       LAPAROSCOPIC CHOLECYSTECTOMY  7/3/2012     NO HISTORY OF SURGERY            CC No referring provider defined for this encounter. on close of this encounter.    Provider Time: 15 minutes spent on the date of the encounter doing chart review, review of test results, interpretation of tests, patient visit (including history, exam, and counseling) and documentation.  Staff:   Jacey Cervantes MD    Department of  "Dermatology  [facility]: Phone: [phone], Fax:[phone]  [facility]: Phone: [phone], Fax: [phone]          Teledermatology Nurse Call Patients:     Are you  in the [facility] at the time of the encounter? yes    Today's visit will be billed to you and your insurance.    A teledermatology visit is not as thorough as an in-person visit and the quality of the photograph sent may not be of the same quality as that taken by the dermatology clinic.       - Reports that psoriasis is \"okay.\" Has a bit of a flare up but not horrible. He sent a photo of the area that needs to be monitored. No further questions at this time.  Re Veronica LPN                                                                                                                                                                                                                                 Again, thank you for allowing me to participate in the care of your patient.        Sincerely,        Jacey Cervantes MD    "

## 2021-01-08 ENCOUNTER — TELEPHONE (OUTPATIENT)
Dept: DERMATOLOGY | Facility: CLINIC | Age: 51
End: 2021-01-08

## 2021-01-08 NOTE — TELEPHONE ENCOUNTER
1st attempt. Left message for patient to return call. Patient is due for a return in person visit with any dermatology provider per last office visit with Dr Cervantes.  Patient should schedule within the next 4 weeks.  Writer's direct number provided to schedule.    Dr Cervantes also placed a referral to hematology.  Provided new patient scheduling number in message also.    Thanks,   Dorothy Lui  Surgical Specialties Procedure   [employer] [place]  1/8/2021 9:11 AM

## 2021-01-11 NOTE — TELEPHONE ENCOUNTER
ONCOLOGY INTAKE: Records Information      APPT INFORMATION:  Referring provider:  Dr. Jacey Cervantes MD  Referring provider s clinic:  [facility]  Reason for visit/diagnosis:  Leukocytosis, unspecified type   Has patient been notified of appointment date and time?: Yes    RECORDS INFORMATION:  Were the records received with the referral (via [employer])? No,Internal Referral      Has patient been seen for any external appt for this diagnosis? No    If yes, where? NA        ADDITIONAL INFORMATION:  None

## 2021-01-12 ENCOUNTER — OFFICE VISIT (OUTPATIENT)
Dept: DERMATOLOGY | Facility: CLINIC | Age: 51
End: 2021-01-12
Payer: COMMERCIAL

## 2021-01-12 DIAGNOSIS — L40.9 PSORIASIS: Primary | ICD-10-CM

## 2021-01-12 DIAGNOSIS — D72.829 LEUKOCYTOSIS, UNSPECIFIED TYPE: ICD-10-CM

## 2021-01-12 PROCEDURE — 99213 OFFICE O/P EST LOW 20 MIN: CPT | Performed by: DERMATOLOGY

## 2021-01-12 ASSESSMENT — PAIN SCALES - GENERAL: PAINLEVEL: NO PAIN (0)

## 2021-01-12 NOTE — TELEPHONE ENCOUNTER
RECORDS STATUS - ALL OTHER DIAGNOSIS      RECORDS RECEIVED FROM: [facility]   DATE RECEIVED: 2/2/2021   NOTES STATUS DETAILS   OFFICE NOTE from referring provider Complete Jacey Cervantes MD   OFFICE NOTE from medical oncologist N/A    DISCHARGE SUMMARY from hospital N/A    DISCHARGE REPORT from the ER     OPERATIVE REPORT N/A    MEDICATION LIST Complete [facility]   CLINICAL TRIAL TREATMENTS TO DATE N/A    LABS     PATHOLOGY REPORTS N/A    ANYTHING RELATED TO DIAGNOSIS Complete Labs last updated on 1/4/2021   GENONOMIC TESTING     TYPE:     IMAGING (NEED IMAGES & REPORT)     CT SCANS     MRI     MAMMO     ULTRASOUND Complete US Testicular 2/17/2020    PET

## 2021-01-12 NOTE — LETTER
1/12/2021         RE: To White  [address] [facility] [address]        Dear Colleague,    Thank you for referring your patient, To White, to the [facility]. Please see a copy of my visit note below.    [facility] Dermatology Note  Encounter Date: Jan 12, 2021  Office Visit     Dermatology Problem List:  1.Plaque psoriasis: 1-3% TBSA  -Current Tx: Stelara, triamcinolone 0.1% ointment, desonide 0.05% ointment, calcipotriene 0.005% ointment, Clobetasol  Past tx: Soriatane 25 mg daily stopped due to pyogenic granulomas 6/2018, Humira  Quant gold 9/4/2020  2. Onycholysis of distal right toenails, May be in association with psoriasis.    ____________________________________________    Assessment & Plan:  # Plaque psoriasis - chronic and stable.  Has hx of joint pain. On Stelara - patient doing well    Clobetasol cream BID for up to 2 weeks in arow -no change    Okay to use dovonex- noc hange    Hold desonide, not active on face or groin. OKay to use when active- no change    2. Elevated WBC count  -pending heme onc 2/2     3. Joint pain, chronic still active  -pending rheum evaluation 2/24     4. - Within the crease of abdomen theres a hyperpigmented patch, smaller than original photo and more hyperpigmented, consistent with trauma from waist band of clothing and resolving compared to photo  -Continue to monitor    Procedures Performed:        Follow-up: 4 month(s) virtually (telephone with photos) or in person, or earlier for new or changing lesions    Staff and Scribe:     Scribe Disclosure:   I, Ronny Huang, am serving as a scribe to document services personally performed by this physician, Dr. aJcey Cervantes, based on data collection and the provider's statements to me.     Provider Disclosure:   The documentation recorded by the scribe accurately reflects the services I personally performed and the decisions made by me.    Jacey Cervantes,  MD    Department of Dermatology  [facility]: Phone: [phone], Fax:[phone]  [facility]: Phone: [phone], Fax: [phone]      ____________________________________________    CC: Skin Check (area of concern left abdomen no hx skin cancner self or family) and Psoriasis (doing well on stelera, triamcinolone ointment, desonide ointment calcipotriene ointment and clobetasol)      HPI:  Mr. To White is a(n) 50 year old male who presents today as a return patient for evaluation of a non healing area not responding to steroids on the left abdomen. Last seen 01/07/2021 when this spot on the left abdomen was noted.    Today, he presents for a skin check. He has areas of concern on the left abdomen. He notes his psoriasis is doing well on Stelera, TAC ointment, desonide ointment, calcipotriene and clobetasol. Denies morning stiffness, although he has a hx of joint pain. Denies any psoriasis spots on his genitals.    Patient is otherwise feeling well, without additional concerns.    ROS: As per HPI    Labs:   reviewed.    Physical Exam:  Vitals: There were no vitals taken for this visit.  SKIN: Total skin excluding the undergarment areas was performed. The exam included the head/face, neck, both arms, chest, back, abdomen, both legs, digits and/or nails.   - Within the crease of abdomen theres a hyperpigmented patch, smaller than original photo - continue to monitor  - There are 3 nickel sized erythematous well demarcated plaques with silvery micaceous scale on the back.  -  Right buttocks: 5 tan macules  - No other lesions of concern on areas examined.     Medications:  Current Outpatient Medications   Medication     amLODIPine (NORVASC) 10 MG tablet     benazepril (LOTENSIN) 40 MG tablet     calcipotriene 0.005 % OINT     clobetasol (TEMOVATE) 0.05 % external ointment     clobetasol  (TEMOVATE) 0.05 % external solution     Fexofenadine HCl (ALLEGRA PO)     hydrOXYzine (ATARAX) 25 MG tablet     Pseudoephedrine-Ibuprofen (ADVIL COLD/SINUS PO)     ustekinumab (STELARA) 90 MG/ML     No current facility-administered medications for this visit.       Past Medical/Surgical History:   Patient Active Problem List   Diagnosis     Other psoriasis     Obesity     Essential hypertension with goal blood pressure less than 140/90     Obesity, Class I, BMI 30-34.9     Benign cyst of both testicles     Past Medical History:   Diagnosis Date     Essential hypertension, benign 2000     Hypertension      Obesity      Other psoriasis     since childhood        CC No referring provider defined for this encounter. on close of this encounter.      Again, thank you for allowing me to participate in the care of your patient.        Sincerely,        Jacey Cervantes MD

## 2021-01-12 NOTE — NURSING NOTE
To White's goals for this visit include:   Chief Complaint   Patient presents with     Skin Check     area of concern left abdomen no hx skin cancner self or family     Psoriasis     doing well on stelera, triamcinolone ointment, desonide ointment calcipotriene ointment and clobetasol       He requests these members of his care team be copied on today's visit information:     PCP: Annel Yusuf    Referring Provider:  No referring provider defined for this encounter.    There were no vitals taken for this visit.    Do you need any medication refills at today's visit? Lupe Byrnes LPN

## 2021-01-12 NOTE — PATIENT INSTRUCTIONS
[facility] Dermatology Visit    Thank you for allowing us to participate in your care. Your findings, instructions and follow-up plan are as follows:         When should I call my doctor?    If you are worsening or not improving, please, contact us or seek urgent care as noted below.     Who should I call with questions (adults)?    [facility] (adult and pediatric): [phone]     [facility] (adult): [phone]    For urgent needs outside of business hours call the [facility] at [phone] and ask for the dermatology resident on call    If this is a medical emergency and you are unable to reach an ER, Call 911      Who should I call with questions (pediatric)?  [facility]- Pediatric Dermatology  Dr. Torri Zimmerman, Dr. Geoff Sifuentes, Dr. Shruthi Ghotra, Ivelisse Hamilton, PA  Dr. Divine Florence, Dr. Aimee Gunn & Dr. Jeremias Gaston  Non Urgent  Nurse Triage Line; [phone]- Kasie and Purvi RN Care Coordinators   Krysta (/Complex ) [phone]    If you need a prescription refill, please contact your pharmacy. Refills are approved or denied by our Physicians during normal business hours, Monday through Fridays  Per office policy, refills will not be granted if you have not been seen within the past year (or sooner depending on your child's condition)    Scheduling Information:  Pediatric Appointment Scheduling and Call Center [phone]  Radiology Scheduling- [phone]  Sedation Unit Scheduling- [phone]  [place] Scheduling- General [phone]; Pediatric Dermatology [phone]  Main  Services: [phone]  Indonesian: [phone]  French: [phone]  Hmong/Sinhala/Polish: [phone]  Preadmission Nursing Department Fax Number: [phone] (Fax all pre-operative paperwork to this number)    For urgent matters arising during evenings,  weekends, or holidays that cannot wait for normal business hours please call [phone] and ask for the Dermatology Resident On-Call to be paged.

## 2021-01-12 NOTE — PROGRESS NOTES
[facility] Dermatology Note  Encounter Date: Jan 12, 2021  Office Visit     Dermatology Problem List:  1.Plaque psoriasis: 1-3% TBSA  -Current Tx: Stelara, triamcinolone 0.1% ointment, desonide 0.05% ointment, calcipotriene 0.005% ointment, Clobetasol  Past tx: Soriatane 25 mg daily stopped due to pyogenic granulomas 6/2018, Humira  Quant gold 9/4/2020  2. Onycholysis of distal right toenails, May be in association with psoriasis.    ____________________________________________    Assessment & Plan:  # Plaque psoriasis - chronic and stable.  Has hx of joint pain. On Stelara - patient doing well    Clobetasol cream BID for up to 2 weeks in arow -no change    Okay to use dovonex- noc hange    Hold desonide, not active on face or groin. OKay to use when active- no change    2. Elevated WBC count  -pending heme onc 2/2     3. Joint pain, chronic still active  -pending rheum evaluation 2/24     4. - Within the crease of abdomen theres a hyperpigmented patch, smaller than original photo and more hyperpigmented, consistent with trauma from waist band of clothing and resolving compared to photo  -Continue to monitor    Procedures Performed:        Follow-up: 4 month(s) virtually (telephone with photos) or in person, or earlier for new or changing lesions    Staff and Scribe:     Scribe Disclosure:   I, Ronny Huang, am serving as a scribe to document services personally performed by this physician, Dr. Jacey Cervantes, based on data collection and the provider's statements to me.     Provider Disclosure:   The documentation recorded by the scribe accurately reflects the services I personally performed and the decisions made by me.    Jacey Cervantes MD    Department of Dermatology  [facility]: Phone: [phone], Fax:[phone]  [facility] [place]: Phone: [phone], Fax:  [phone]      ____________________________________________    CC: Skin Check (area of concern left abdomen no hx skin cancner self or family) and Psoriasis (doing well on stelera, triamcinolone ointment, desonide ointment calcipotriene ointment and clobetasol)      HPI:  Mr. To White is a(n) 50 year old male who presents today as a return patient for evaluation of a non healing area not responding to steroids on the left abdomen. Last seen 01/07/2021 when this spot on the left abdomen was noted.    Today, he presents for a skin check. He has areas of concern on the left abdomen. He notes his psoriasis is doing well on Stelera, TAC ointment, desonide ointment, calcipotriene and clobetasol. Denies morning stiffness, although he has a hx of joint pain. Denies any psoriasis spots on his genitals.    Patient is otherwise feeling well, without additional concerns.    ROS: As per HPI    Labs:   reviewed.    Physical Exam:  Vitals: There were no vitals taken for this visit.  SKIN: Total skin excluding the undergarment areas was performed. The exam included the head/face, neck, both arms, chest, back, abdomen, both legs, digits and/or nails.   - Within the crease of abdomen theres a hyperpigmented patch, smaller than original photo - continue to monitor  - There are 3 nickel sized erythematous well demarcated plaques with silvery micaceous scale on the back.  -  Right buttocks: 5 tan macules  - No other lesions of concern on areas examined.     Medications:  Current Outpatient Medications   Medication     amLODIPine (NORVASC) 10 MG tablet     benazepril (LOTENSIN) 40 MG tablet     calcipotriene 0.005 % OINT     clobetasol (TEMOVATE) 0.05 % external ointment     clobetasol (TEMOVATE) 0.05 % external solution     Fexofenadine HCl (ALLEGRA PO)     hydrOXYzine (ATARAX) 25 MG tablet     Pseudoephedrine-Ibuprofen (ADVIL COLD/SINUS PO)     ustekinumab (STELARA) 90 MG/ML     No current facility-administered medications for  this visit.       Past Medical/Surgical History:   Patient Active Problem List   Diagnosis     Other psoriasis     Obesity     Essential hypertension with goal blood pressure less than 140/90     Obesity, Class I, BMI 30-34.9     Benign cyst of both testicles     Past Medical History:   Diagnosis Date     Essential hypertension, benign 2000     Hypertension      Obesity      Other psoriasis     since childhood        CC No referring provider defined for this encounter. on close of this encounter.

## 2021-01-25 NOTE — PROGRESS NOTES
"This patient  is being evaluated via a billable video visit.      The patient has been notified of following:     \"This video visit will be conducted via a call between you and your physician/provider. We have found that certain health care needs can be provided without the need for an in-person physical exam.  This service lets us provide the care you need with a video conversation.  If a prescription is necessary we can send it directly to your pharmacy.  If lab work is needed we can place an order for that and you can then stop by our lab to have the test done at a later time.    Video visits are billed at different rates depending on your insurance coverage.  Please reach out to your insurance provider with any questions.    If during the course of the call the physician/provider feels a video visit is not appropriate, you will not be charged for this service.\"    Patient has given verbal consent for Video visit? yes  Video-Visit Details    Type of service:  Video Visit    Video visit duration: 24  min  Originating Location (pt. Location): home    Distant Location (provider location):  [facility]     Platform used for Video Visit: Mc Martinez MD        Hematology Consultation:  Date on this visit: 2/2/2021    Anne White  is referred by Dr.Ronda Cervantes for a hematology consultation. He requires evaluation for new diagnosis of leucocytosis.      Primary care physician: Annel Yusuf     History Of Present Illness:  Mr. White is a 50 year old male who presents with new diagnosis of leukocytosis with normal absolute differential counts.  Results for ANNE WHITE (MRN [id number]) as of 1/24/2021 23:49   Ref. Range 9/5/2019 08:14 2/17/2020 10:51 9/4/2020 08:33 10/23/2020 07:37 1/4/2021 15:59   WBC Latest Ref Range: 4.0 - 11.0 10e9/L 8.8 8.2 12.2 (H) 12.6 (H) 15.2 (H)   ANC normal. Hb, platelet count normal. Creat and LFTs are normal.  He is on Stelara for plaque " psoriasis.  He reports being on Stelara for the last couple of years.  He has had no recent infections.  He has had no constitutional symptoms such as fevers chills or night sweats.  He will be seen rheumatology for relation of joint pains.  In addition, a complete 12 point  review of systems is negative.    Past Medical/Surgical History:  Past Medical History:   Diagnosis Date     Essential hypertension, benign 2000     Hypertension      Obesity      Other psoriasis     since childhood     Past Surgical History:   Procedure Laterality Date     CHOLECYSTECTOMY       LAPAROSCOPIC CHOLECYSTECTOMY  7/3/2012    Procedure: LAPAROSCOPIC CHOLECYSTECTOMY;  Laparoscopic Cholecystectomy;  Surgeon: Kulwinder Hollins MD;  Location: [facility] OR     NO HISTORY OF SURGERY       Allergies:  Allergies as of 02/02/2021 - Reviewed 01/12/2021   Allergen Reaction Noted     No known allergies  12/19/2002     Current Medications:  Current Outpatient Medications   Medication Sig Dispense Refill     amLODIPine (NORVASC) 10 MG tablet Take 1 tablet (10 mg) by mouth daily 90 tablet 3     benazepril (LOTENSIN) 40 MG tablet Take 1 tablet (40 mg) by mouth daily 90 tablet 3     calcipotriene 0.005 % OINT Apply to the affected area twice a day Mon-Fri when rash is relatively calm. 240 g 3     clobetasol (TEMOVATE) 0.05 % external ointment Apply twice daily for 2 weeks to back. 60 g 4     clobetasol (TEMOVATE) 0.05 % external solution Apply on scalp for up to 2 weeks in a row for flares 50 mL 3     Fexofenadine HCl (ALLEGRA PO) Reported on 5/19/2017       hydrOXYzine (ATARAX) 25 MG tablet TAKE 1-2 TABLETS BY MOUTH  EVERY 6 HOURS AS NEEDED FOR ITCHING 360 tablet 1     Pseudoephedrine-Ibuprofen (ADVIL COLD/SINUS PO) Reported on 5/19/2017       ustekinumab (STELARA) 90 MG/ML Inject 1 mL (90 mg) Subcutaneous every 3 months 1 mL 3      Family History:  Family History   Problem Relation Age of Onset     Hypertension Father      Cerebrovascular Disease Maternal  Grandmother      Cancer - colorectal Maternal Uncle         dx age 45,  before age 50     Gastrointestinal Disease Mother         benign colon polyps age over 50     Diabetes No family hx of      Prostate Cancer No family hx of      No family history of bleeding or clotting disorder.  Social History:  Social History     Socioeconomic History     Marital status:      Spouse name: Not on file     Number of children: 4     Years of education: Not on file     Highest education level: Not on file   Tobacco Use     Smoking status: Never Smoker     Smokeless tobacco: Never Used   Substance and Sexual Activity     Alcohol use: Yes     Comment: Rarely     Drug use: No     Sexual activity: Yes     Partners: Female     Birth control/protection: Condom     Physical Exam:  Wt Readings from Last 5 Encounters:   20 119.1 kg (262 lb 9.6 oz)   18 125.6 kg (277 lb)   18 117.5 kg (259 lb)   17 118.4 kg (261 lb)   17 116.1 kg (256 lb)       Constitutional: alert and in no distress  Eyes: No redness or discharge  Respiratory: No cough or labored breathing.  Musculoskeletal: Full range of motion in extremities.  Skin: no visible skin lesions or discoloration  Neurological: No tremors and denies headache.  Psychiatric: Mentation appears normal and affect is normal as well.  Alert and oriented x3.  The rest the comprehensive physical examination is deferred due to public health emergency video visit restrictions.    Labs:  Labs reviewed and documented in the EMR.    ASSESSMENT/PLAN:  To is a very pleasant 50-year-old gentleman with history of plaque psoriasis, on Stelara, with worsening leukocytosis since 2020, with normal absolute differential counts.  We will proceed with further evaluation and obtain peripheral blood smear, TSH, and JAK2,CALR and MPL mutation analysis for evaluation of chronic myeloproliferative disease.  We will also obtain baseline creatinine and liver function  tests.  He will have his labs done following his visit with Dr. العلي on 2/24.   We'll inform the patient of the results and recommendations and  f/up plan based on the results.   It was my pleasure to meet To.  At the end of our visit patient verbalized understanding and concurred with the plan.    Addendum: Labs on February 24, 2021:  Creatinine, LFTs within normal limits.  BCR/ABL major undetectable.  JAK2, MPL> CALR  mutation analysis negative, TSH normal.  CBC essentially Stefan count showed WBC of 13.0, hemoglobin of 14.6 and platelet count of [id number] per microliter.  Borderline eosinophilia with absolute eosinophil count at 0.8 noted, and otherwise differential count within normal limits.  ALC high normal at 4.8.  Previously, ALC high normal at 5 in 2020 and 5.3 in January 2021.  Peripheral blood smear showed slight leukocytosis with borderline eosinophilia.  Lymphocytes appeared polymorphous and neutrophils were unremarkable in morphology.  He was seen by rheumatology team and was felt to have no evidence of psoriatic arthritis.    Plan: f/up in 3-4 months with CBCd and flow cytometry prior.

## 2021-02-02 ENCOUNTER — VIRTUAL VISIT (OUTPATIENT)
Dept: ONCOLOGY | Facility: CLINIC | Age: 51
End: 2021-02-02
Attending: DERMATOLOGY
Payer: COMMERCIAL

## 2021-02-02 ENCOUNTER — PRE VISIT (OUTPATIENT)
Dept: ONCOLOGY | Facility: CLINIC | Age: 51
End: 2021-02-02

## 2021-02-02 DIAGNOSIS — D72.829 LEUKOCYTOSIS, UNSPECIFIED TYPE: ICD-10-CM

## 2021-02-02 PROCEDURE — 99204 OFFICE O/P NEW MOD 45 MIN: CPT | Mod: 95 | Performed by: INTERNAL MEDICINE

## 2021-02-02 NOTE — LETTER
"    2/2/2021         RE: Anne White  [address] [address] [place] [id number]        Dear Colleague,    Thank you for referring your patient, Anne White, to the [facility]. Please see a copy of my visit note below.    This patient  is being evaluated via a billable video visit.      The patient has been notified of following:     \"This video visit will be conducted via a call between you and your physician/provider. We have found that certain health care needs can be provided without the need for an in-person physical exam.  This service lets us provide the care you need with a video conversation.  If a prescription is necessary we can send it directly to your pharmacy.  If lab work is needed we can place an order for that and you can then stop by our lab to have the test done at a later time.    Video visits are billed at different rates depending on your insurance coverage.  Please reach out to your insurance provider with any questions.    If during the course of the call the physician/provider feels a video visit is not appropriate, you will not be charged for this service.\"    Patient has given verbal consent for Video visit? yes  Video-Visit Details    Type of service:  Video Visit    Video visit duration: 24  min  Originating Location (pt. Location): home    Distant Location (provider location):  [facility]     Platform used for Video Visit: Mc Martinez MD        Hematology Consultation:  Date on this visit: 2/2/2021    Anne White  is referred by Dr.Ronda Cervantes for a hematology consultation. He requires evaluation for new diagnosis of leucocytosis.      Primary care physician: Annel Yusuf     History Of Present Illness:  Mr. White is a 50 year old male who presents with new diagnosis of leukocytosis with normal absolute differential counts.  Results for ANNE WHITE (MRN [id number]) as of 1/24/2021 23:49   Ref. Range " 9/5/2019 08:14 2/17/2020 10:51 9/4/2020 08:33 10/23/2020 07:37 1/4/2021 15:59   WBC Latest Ref Range: 4.0 - 11.0 10e9/L 8.8 8.2 12.2 (H) 12.6 (H) 15.2 (H)   ANC normal. Hb, platelet count normal. Creat and LFTs are normal.  He is on Stelara for plaque psoriasis.  He reports being on Stelara for the last couple of years.  He has had no recent infections.  He has had no constitutional symptoms such as fevers chills or night sweats.  He will be seen rheumatology for relation of joint pains.  In addition, a complete 12 point  review of systems is negative.    Past Medical/Surgical History:  Past Medical History:   Diagnosis Date     Essential hypertension, benign 2000     Hypertension      Obesity      Other psoriasis     since childhood     Past Surgical History:   Procedure Laterality Date     CHOLECYSTECTOMY       LAPAROSCOPIC CHOLECYSTECTOMY  7/3/2012    Procedure: LAPAROSCOPIC CHOLECYSTECTOMY;  Laparoscopic Cholecystectomy;  Surgeon: Kulwinder Hollins MD;  Location: [facility] OR     NO HISTORY OF SURGERY       Allergies:  Allergies as of 02/02/2021 - Reviewed 01/12/2021   Allergen Reaction Noted     No known allergies  12/19/2002     Current Medications:  Current Outpatient Medications   Medication Sig Dispense Refill     amLODIPine (NORVASC) 10 MG tablet Take 1 tablet (10 mg) by mouth daily 90 tablet 3     benazepril (LOTENSIN) 40 MG tablet Take 1 tablet (40 mg) by mouth daily 90 tablet 3     calcipotriene 0.005 % OINT Apply to the affected area twice a day Mon-Fri when rash is relatively calm. 240 g 3     clobetasol (TEMOVATE) 0.05 % external ointment Apply twice daily for 2 weeks to back. 60 g 4     clobetasol (TEMOVATE) 0.05 % external solution Apply on scalp for up to 2 weeks in a row for flares 50 mL 3     Fexofenadine HCl (ALLEGRA PO) Reported on 5/19/2017       hydrOXYzine (ATARAX) 25 MG tablet TAKE 1-2 TABLETS BY MOUTH  EVERY 6 HOURS AS NEEDED FOR ITCHING 360 tablet 1     Pseudoephedrine-Ibuprofen (ADVIL  COLD/SINUS PO) Reported on 2017       ustekinumab (STELARA) 90 MG/ML Inject 1 mL (90 mg) Subcutaneous every 3 months 1 mL 3      Family History:  Family History   Problem Relation Age of Onset     Hypertension Father      Cerebrovascular Disease Maternal Grandmother      Cancer - colorectal Maternal Uncle         dx age 45,  before age 50     Gastrointestinal Disease Mother         benign colon polyps age over 50     Diabetes No family hx of      Prostate Cancer No family hx of      No family history of bleeding or clotting disorder.  Social History:  Social History     Socioeconomic History     Marital status:      Spouse name: Not on file     Number of children: 4     Years of education: Not on file     Highest education level: Not on file   Tobacco Use     Smoking status: Never Smoker     Smokeless tobacco: Never Used   Substance and Sexual Activity     Alcohol use: Yes     Comment: Rarely     Drug use: No     Sexual activity: Yes     Partners: Female     Birth control/protection: Condom     Physical Exam:  Wt Readings from Last 5 Encounters:   20 119.1 kg (262 lb 9.6 oz)   18 125.6 kg (277 lb)   18 117.5 kg (259 lb)   17 118.4 kg (261 lb)   17 116.1 kg (256 lb)       Constitutional: alert and in no distress  Eyes: No redness or discharge  Respiratory: No cough or labored breathing.  Musculoskeletal: Full range of motion in extremities.  Skin: no visible skin lesions or discoloration  Neurological: No tremors and denies headache.  Psychiatric: Mentation appears normal and affect is normal as well.  Alert and oriented x3.  The rest the comprehensive physical examination is deferred due to public health emergency video visit restrictions.    Labs:  Labs reviewed and documented in the EMR.    ASSESSMENT/PLAN:  To is a very pleasant 50-year-old gentleman with history of plaque psoriasis, on Stelara, with worsening leukocytosis since 2020, with normal absolute  differential counts.  We will proceed with further evaluation and obtain peripheral blood smear, TSH, and JAK2,CALR and MPL mutation analysis for evaluation of chronic myeloproliferative disease.  We will also obtain baseline creatinine and liver function tests.  He will have his labs done following his visit with Dr. العلي on 2/24.   We'll inform the patient of the results and recommendations and  f/up plan based on the results.   It was my pleasure to meet To.  At the end of our visit patient verbalized understanding and concurred with the plan.          Again, thank you for allowing me to participate in the care of your patient.        Sincerely,        Kelsey Martinez MD, MD

## 2021-02-02 NOTE — NURSING NOTE
To is a 50 year old who is being evaluated via a billable video visit.      How would you like to obtain your AVS? MyChart  If the video visit is dropped, the invitation should be resent by: Text to cell phone: [phone]  Will anyone else be joining your video visit? No    Video-Visit Details    Type of service:  Video Visit    Originating Location (pt. Location): Home    Distant Location (provider location):  [facility]     Platform used for Video Visit: Dianne Orozco CMA

## 2021-02-24 ENCOUNTER — OFFICE VISIT (OUTPATIENT)
Dept: RHEUMATOLOGY | Facility: CLINIC | Age: 51
End: 2021-02-24
Payer: COMMERCIAL

## 2021-02-24 VITALS
HEART RATE: 79 BPM | SYSTOLIC BLOOD PRESSURE: 120 MMHG | HEIGHT: 75 IN | BODY MASS INDEX: 34.8 KG/M2 | OXYGEN SATURATION: 97 % | WEIGHT: 279.9 LBS | DIASTOLIC BLOOD PRESSURE: 85 MMHG

## 2021-02-24 DIAGNOSIS — D72.829 LEUKOCYTOSIS, UNSPECIFIED TYPE: ICD-10-CM

## 2021-02-24 DIAGNOSIS — E66.01 MORBID OBESITY (H): ICD-10-CM

## 2021-02-24 DIAGNOSIS — M19.012 PRIMARY OSTEOARTHRITIS OF BOTH SHOULDERS: Primary | ICD-10-CM

## 2021-02-24 DIAGNOSIS — M19.011 PRIMARY OSTEOARTHRITIS OF BOTH SHOULDERS: Primary | ICD-10-CM

## 2021-02-24 LAB
ALBUMIN SERPL-MCNC: 4.1 G/DL (ref 3.4–5)
ALP SERPL-CCNC: 88 U/L (ref 40–150)
ALT SERPL W P-5'-P-CCNC: 29 U/L (ref 0–70)
AST SERPL W P-5'-P-CCNC: 16 U/L (ref 0–45)
BASOPHILS # BLD AUTO: 0.1 10E9/L (ref 0–0.2)
BASOPHILS NFR BLD AUTO: 1 %
BILIRUB DIRECT SERPL-MCNC: <0.1 MG/DL (ref 0–0.2)
BILIRUB SERPL-MCNC: 0.3 MG/DL (ref 0.2–1.3)
CREAT SERPL-MCNC: 1.01 MG/DL (ref 0.66–1.25)
DIFFERENTIAL METHOD BLD: ABNORMAL
EOSINOPHIL # BLD AUTO: 0.8 10E9/L (ref 0–0.7)
EOSINOPHIL NFR BLD AUTO: 5.8 %
ERYTHROCYTE [DISTWIDTH] IN BLOOD BY AUTOMATED COUNT: 12.5 % (ref 10–15)
GFR SERPL CREATININE-BSD FRML MDRD: 86 ML/MIN/{1.73_M2}
HCT VFR BLD AUTO: 43.1 % (ref 40–53)
HGB BLD-MCNC: 14.6 G/DL (ref 13.3–17.7)
IMM GRANULOCYTES # BLD: 0.1 10E9/L (ref 0–0.4)
IMM GRANULOCYTES NFR BLD: 0.5 %
LYMPHOCYTES # BLD AUTO: 4.8 10E9/L (ref 0.8–5.3)
LYMPHOCYTES NFR BLD AUTO: 37.2 %
MCH RBC QN AUTO: 30 PG (ref 26.5–33)
MCHC RBC AUTO-ENTMCNC: 33.9 G/DL (ref 31.5–36.5)
MCV RBC AUTO: 89 FL (ref 78–100)
MONOCYTES # BLD AUTO: 0.9 10E9/L (ref 0–1.3)
MONOCYTES NFR BLD AUTO: 7.2 %
NEUTROPHILS # BLD AUTO: 6.3 10E9/L (ref 1.6–8.3)
NEUTROPHILS NFR BLD AUTO: 48.3 %
PLATELET # BLD AUTO: 403 10E9/L (ref 150–450)
PROT SERPL-MCNC: 8.1 G/DL (ref 6.8–8.8)
RBC # BLD AUTO: 4.87 10E12/L (ref 4.4–5.9)
RETICS # AUTO: 66.7 10E9/L (ref 25–95)
RETICS/RBC NFR AUTO: 1.4 % (ref 0.5–2)
TSH SERPL DL<=0.005 MIU/L-ACNC: 2.28 MU/L (ref 0.4–4)
WBC # BLD AUTO: 13 10E9/L (ref 4–11)

## 2021-02-24 PROCEDURE — 82565 ASSAY OF CREATININE: CPT | Performed by: INTERNAL MEDICINE

## 2021-02-24 PROCEDURE — 81206 BCR/ABL1 GENE MAJOR BP: CPT | Performed by: INTERNAL MEDICINE

## 2021-02-24 PROCEDURE — 81219 CALR GENE COM VARIANTS: CPT

## 2021-02-24 PROCEDURE — G0452 MOLECULAR PATHOLOGY INTERPR: HCPCS | Mod: 59 | Performed by: PATHOLOGY

## 2021-02-24 PROCEDURE — 81279 JAK2 GENE TRGT SEQUENCE ALYS: CPT | Performed by: INTERNAL MEDICINE

## 2021-02-24 PROCEDURE — 99203 OFFICE O/P NEW LOW 30 MIN: CPT | Performed by: STUDENT IN AN ORGANIZED HEALTH CARE EDUCATION/TRAINING PROGRAM

## 2021-02-24 PROCEDURE — 85025 COMPLETE CBC W/AUTO DIFF WBC: CPT | Performed by: INTERNAL MEDICINE

## 2021-02-24 PROCEDURE — 80076 HEPATIC FUNCTION PANEL: CPT | Performed by: INTERNAL MEDICINE

## 2021-02-24 PROCEDURE — 36415 COLL VENOUS BLD VENIPUNCTURE: CPT | Performed by: INTERNAL MEDICINE

## 2021-02-24 PROCEDURE — 81403 MOPATH PROCEDURE LEVEL 4: CPT | Performed by: INTERNAL MEDICINE

## 2021-02-24 PROCEDURE — 84443 ASSAY THYROID STIM HORMONE: CPT | Performed by: INTERNAL MEDICINE

## 2021-02-24 PROCEDURE — G0452 MOLECULAR PATHOLOGY INTERPR: HCPCS | Mod: 26 | Performed by: PATHOLOGY

## 2021-02-24 PROCEDURE — 81403 MOPATH PROCEDURE LEVEL 4: CPT

## 2021-02-24 PROCEDURE — 85060 BLOOD SMEAR INTERPRETATION: CPT | Performed by: PATHOLOGY

## 2021-02-24 PROCEDURE — 85045 AUTOMATED RETICULOCYTE COUNT: CPT | Performed by: INTERNAL MEDICINE

## 2021-02-24 ASSESSMENT — MIFFLIN-ST. JEOR: SCORE: 2212.31

## 2021-02-24 ASSESSMENT — PAIN SCALES - GENERAL: PAINLEVEL: MILD PAIN (3)

## 2021-02-24 NOTE — PROGRESS NOTES
"To White's goals for this visit include: Consult   He requests these members of his care team be copied on today's visit information:     PCP: Annel Yusuf    Referring Provider:  No referring provider defined for this encounter.    Ht 1.892 m (6' 2.5\")   Wt 127 kg (279 lb 14.4 oz)   BMI 35.46 kg/m      Do you need any medication refills at today's visit? No     [facility]      Rheumatology Clinic Visit     To White MRN# [id number]   YOB: 1970 Age: 50 year old     Date of Visit: Feb 24, 2021   Primary care provider: Annel Yusuf          Assessment and Plan:     Assessment     --Bilateral AC joint degenerative arthritis  --Psoriasis on Stelara  --Leukocytosis  --Morbid obesity    Mr. White is 50 year old seen in clinic for evaluation of joint pains.    Bilateral shoulder pain: He has history of bilateral shoulder pain for about 5 years.  X-ray of the bilateral shoulders in 2015 showed mild to moderate degenerative arthritis of the AC joint.  Range of motion of bilateral shoulders is normal, mild pain on flexion and abduction with over head movements.  He has mild tenderness over the AC joints bilaterally.  Other than that no evidence of synovitis or tenderness noted over small joints of hands, wrists, feet, ankles.  No knee effusions.  No dactylitis seen.  No SI joint tenderness.    His physical exam is suggestive of Osteoarthritis of bilateral AC joints.  No evidence of inflammatory arthritis or psoriatic arthritis noted.  No further autoimmune testing ordered.    For shoulder pain recommended to do physical therapy.  He wants to do stretching exercises at home.  If it worsens will consider physical therapy.    He can use topical Voltaren gel on as-needed basis.  Tylenol arthritis 650 mg 1-2 times a day on as needed can be used.     Plan    No evidence of inflammatory arthritis to suggest Psoriatic arthritis     Pain in the shoulders is related to Osteoarthritis.     I " recommend doing Physical therapy     You can use topical Voltaren gel on as needed basis     Follow up on as needed basis.         TIME SPENT:   A total of 45 minutes was spent on the patient today, greater than 50% of that time was spent on face to face counseling and care coordination regarding diagnoses and treatment options as mentioned above.                    Active Problem List:     Patient Active Problem List    Diagnosis Date Noted     Obesity, Class I, BMI 30-34.9 02/17/2020     Priority: Medium     Benign cyst of both testicles 02/17/2020     Priority: Medium     Essential hypertension with goal blood pressure less than 140/90 08/08/2016     Priority: Medium     Obesity 03/11/2010     Priority: Medium     Other psoriasis 12/19/2002     Priority: Medium            History of Present Illness:   To White is a 50 year old male with PMH of obesity, psoriasis, essential hypertension seen in the clinic in consultation at request of Dr Cervantes for evaluation of joint pains.    He has chronic pain in his shoulders, some times in his hands. Sometimes he has some pain in his knees. No significant pain in his back. Some days he feel mild pain in his wrists. Pain in the shoudler can range from 2 - 6/10 in intensity.  X-ray of bilateral shoulders done in 2015 showing mild to moderate degenerative changes of bilateral AC joints.     He denies any history of dactylitis, plantar fasciitis, heel pain, medial or lateral epicondylitis.  He denies history of inflammatory bowel disease, uveitis or low back pain.    He has plaque psoriasis covering 1-3% BSA.  He follows with Dr. Cervantes and is on Stelara for couple years. He has noticed improvement with Stelara.     Since September 2020 his white blood cell count has been elevated.  Differential is normal.  He is following up with hematology for further evaluation.  Denies any recent infection, smoking, drug abuse.           Review of Systems:     Review Of  Systems  Constitutional: denies fever, chills, night sweats and weight loss.  Skin:+ skin rash.  Eyes: No dryness or irritation in eyes. No episode of eye inflammation or redness.   Ears/Nose/Throat: no recurrent sinus infections.  Respiratory: No shortness of breath, dyspnea on exertion, cough, or hemoptysis  Cardiovascular: no chest pain or palpitations.  Gastrointestinal: no nausea, vomiting, abdominal pain.  Normal bowel movements.  Genitourinary: no dysuria, frequency  or hematuria.  Musculoskeletal: as in HPI  Neurologic: no numbness, tingling.  Psychiatric: no mood disorders.  Hematologic/Lymphatic/Immunologic: no history of easy bruising, petechia or purpura.  No abnormal bleeding.   Endocrine: no h/o thyroid disease or Diabetes.                  Past Medical History:     Past Medical History:   Diagnosis Date     Essential hypertension, benign 2000     Hypertension      Obesity      Other psoriasis     since childhood     Past Surgical History:   Procedure Laterality Date     CHOLECYSTECTOMY       LAPAROSCOPIC CHOLECYSTECTOMY  7/3/2012    Procedure: LAPAROSCOPIC CHOLECYSTECTOMY;  Laparoscopic Cholecystectomy;  Surgeon: Kulwinder Hollins MD;  Location:  OR     NO HISTORY OF SURGERY              Social History:     Social History     Occupational History     Not on file   Tobacco Use     Smoking status: Never Smoker     Smokeless tobacco: Never Used   Substance and Sexual Activity     Alcohol use: Yes     Comment: Rarely     Drug use: No     Sexual activity: Yes     Partners: Female     Birth control/protection: Condom            Family History:     Family History   Problem Relation Age of Onset     Hypertension Father      Cerebrovascular Disease Maternal Grandmother      Cancer - colorectal Maternal Uncle         dx age 45,  before age 50     Gastrointestinal Disease Mother         benign colon polyps age over 50     Diabetes No family hx of      Prostate Cancer No family hx of             Allergies:  "    Allergies   Allergen Reactions     No Known Allergies             Medications:     Current Outpatient Medications   Medication Sig Dispense Refill     amLODIPine (NORVASC) 10 MG tablet Take 1 tablet (10 mg) by mouth daily 90 tablet 3     benazepril (LOTENSIN) 40 MG tablet Take 1 tablet (40 mg) by mouth daily 90 tablet 3     calcipotriene 0.005 % OINT Apply to the affected area twice a day Mon-Fri when rash is relatively calm. 240 g 3     clobetasol (TEMOVATE) 0.05 % external ointment Apply twice daily for 2 weeks to back. 60 g 4     clobetasol (TEMOVATE) 0.05 % external solution Apply on scalp for up to 2 weeks in a row for flares 50 mL 3     Fexofenadine HCl (ALLEGRA PO) Reported on 5/19/2017       hydrOXYzine (ATARAX) 25 MG tablet TAKE 1-2 TABLETS BY MOUTH  EVERY 6 HOURS AS NEEDED FOR ITCHING 360 tablet 1     Pseudoephedrine-Ibuprofen (ADVIL COLD/SINUS PO) Reported on 5/19/2017       ustekinumab (STELARA) 90 MG/ML Inject 1 mL (90 mg) Subcutaneous every 3 months 1 mL 3            Physical Exam:   Blood pressure 120/85, pulse 79, height 1.892 m (6' 2.5\"), weight 127 kg (279 lb 14.4 oz), SpO2 97 %.  Wt Readings from Last 4 Encounters:   02/24/21 127 kg (279 lb 14.4 oz)   02/17/20 119.1 kg (262 lb 9.6 oz)   12/26/18 125.6 kg (277 lb)   01/19/18 117.5 kg (259 lb)       Constitutional: Obese, appearing stated age; cooperative  Eyes: nl EOM, PERRLA, vision, conjunctiva, sclera  ENT: nl external ears, nose, hearing, lips, teeth, gums, throat  No mucous membrane lesions, normal saliva pool  Neck: no mass or thyroid enlargement  Resp: lungs clear to auscultation, nl to palpation  CV: RRR, no murmurs, rubs or gallops, no edema  GI: no ABD mass or tenderness, no HSM  : not tested  Lymph: no cervical, supraclavicular, inguinal or epitrochlear nodes    MS: All TMJ, neck, shoulder, elbow, wrist, MCP/PIP/DIP, spine, hip, knee, ankle, and foot MTP/IP joints were examined.     -- Range of motion of bilateral shoulders is " normal, mild pain on flexion and abduction more than degrees.  He has mild tenderness over the AC joints bilaterally.  Other than that no evidence of synovitis or tenderness noted over small joints of hands, wrists, feet, ankles.  No knee effusions.  No dactylitis seen.  No SI joint tenderness.    Skin: no nail pitting, alopecia, nodules or lesions  Neuro: nl cranial nerves, strength, sensation, DTRs.   Psych: nl judgement, orientation, memory, affect.         Data:     No results found for any visits on 02/24/21.    Recent Labs   Lab Test 01/04/21  1559 10/23/20  0737 09/04/20  0833 02/17/20  1051 06/29/18  1654 06/29/18  1654   WBC 15.2* 12.6* 12.2* 8.2   < > 15.5*   RBC 4.72 4.77 4.53 4.73   < > 4.56   HGB 14.5 14.7 13.9 14.0   < > 14.1   HCT 43.8 43.3 41.6 43.8   < > 41.6   MCV 93 91 92 93   < > 91   RDW 13.5 13.7 13.3 13.9   < > 12.9    359 327 350   < > 366   ALBUMIN  --   --  4.0 3.8  --  3.9   BUN  --   --  15 15  --  7    < > = values in this interval not displayed.      Recent Labs   Lab Test 02/17/20  1051   TSH 1.92     Hemoglobin   Date Value Ref Range Status   01/04/2021 14.5 13.3 - 17.7 g/dL Final   10/23/2020 14.7 13.3 - 17.7 g/dL Final   09/04/2020 13.9 13.3 - 17.7 g/dL Final     Urea Nitrogen   Date Value Ref Range Status   09/04/2020 15 7 - 30 mg/dL Final   02/17/2020 15 7 - 30 mg/dL Final   06/29/2018 7 7 - 30 mg/dL Final     AST   Date Value Ref Range Status   09/04/2020 13 0 - 45 U/L Final   02/17/2020 17 0 - 45 U/L Final   09/05/2019 17 0 - 45 U/L Final     Albumin   Date Value Ref Range Status   09/04/2020 4.0 3.4 - 5.0 g/dL Final   02/17/2020 3.8 3.4 - 5.0 g/dL Final   06/29/2018 3.9 3.4 - 5.0 g/dL Final     Alkaline Phosphatase   Date Value Ref Range Status   09/04/2020 92 40 - 150 U/L Final   02/17/2020 83 40 - 150 U/L Final   06/29/2018 79 40 - 150 U/L Final     ALT   Date Value Ref Range Status   09/04/2020 21 0 - 70 U/L Final   02/17/2020 26 0 - 70 U/L Final   09/05/2019 25 0 -  70 U/L Final     Recent Labs   Lab Test 01/04/21  1559 10/23/20  0737 09/04/20  0833 02/17/20  1051 09/05/19  0814 06/29/18  1654 06/29/18  1654   WBC 15.2* 12.6* 12.2* 8.2 8.8   < > 15.5*   HGB 14.5 14.7 13.9 14.0 14.6   < > 14.1   HCT 43.8 43.3 41.6 43.8 44.3   < > 41.6   MCV 93 91 92 93 92   < > 91    359 327 350 342   < > 366   BUN  --   --  15 15  --   --  7   TSH  --   --   --  1.92  --   --   --    AST  --   --  13 17 17   < > 20   ALT  --   --  21 26 25   < > 23   ALKPHOS  --   --  92 83  --   --  79    < > = values in this interval not displayed.       SHOULDER BILATERAL TWO OR MORE VIEWS July 22, 2015 4:43 PM      HISTORY: Pain in joint, shoulder region.     COMPARISON: None.      FINDINGS:    Right shoulder: There is no fracture.  The humeral head is well  located within the glenoid fossa. There is mild AC joint space loss  with mild inferior hypertrophic change. Coracoclavicular and  glenohumeral spaces are well maintained.  Visualized portions of the  adjacent lung are clear.      Left shoulder: There is no fracture.  The humeral head is well located  within the glenoid fossa.  There is mild AC joint space loss with mild  inferior hypertrophic change. Glenohumeral and coracoclavicular spaces  are well maintained.  Visualized portions of the adjacent lung are  clear.      IMPRESSION  IMPRESSION: Mild/moderate degenerative changes of the bilateral AC  joints. No other significant abnormalities are identified. If there is  clinical concern for rotator cuff pathology, further evaluation with  MRI may be helpful.     ANNE MELENDEZ MD    Reviewed Rheumatology lab flowsheet    Varinder العلي MD  [facility]  Department of Rheumatology & Autoimmune Disorders  [facility]: [phone]   Pager - [phone]

## 2021-02-24 NOTE — PATIENT INSTRUCTIONS
No evidence of inflammatory arthritis to suggest Psoriatic arthritis     Pain in the shoulders is related to Osteoarthritis.     I recommend doing Physical therapy     You can use topical Voltaren gel on as needed basis     Follow up on as needed basis.

## 2021-02-25 LAB — COPATH REPORT: NORMAL

## 2021-03-01 LAB — COPATH REPORT: NORMAL

## 2021-03-05 LAB — COPATH REPORT: NORMAL

## 2021-03-08 LAB — COPATH REPORT: NORMAL

## 2021-03-11 LAB — COPATH REPORT: NORMAL

## 2021-03-21 ENCOUNTER — HEALTH MAINTENANCE LETTER (OUTPATIENT)
Age: 51
End: 2021-03-21

## 2021-06-18 ENCOUNTER — OFFICE VISIT (OUTPATIENT)
Dept: DERMATOLOGY | Facility: CLINIC | Age: 51
End: 2021-06-18
Payer: COMMERCIAL

## 2021-06-18 DIAGNOSIS — L90.5 SCAR: ICD-10-CM

## 2021-06-18 DIAGNOSIS — Z79.899 ENCOUNTER FOR LONG-TERM (CURRENT) USE OF HIGH-RISK MEDICATION: ICD-10-CM

## 2021-06-18 DIAGNOSIS — Z51.81 MEDICATION MONITORING ENCOUNTER: ICD-10-CM

## 2021-06-18 DIAGNOSIS — D72.829 LEUKOCYTOSIS, UNSPECIFIED TYPE: ICD-10-CM

## 2021-06-18 DIAGNOSIS — L40.9 PSORIASIS: Primary | ICD-10-CM

## 2021-06-18 PROCEDURE — 99214 OFFICE O/P EST MOD 30 MIN: CPT | Performed by: DERMATOLOGY

## 2021-06-18 RX ORDER — USTEKINUMAB 90 MG/ML
90 INJECTION, SOLUTION SUBCUTANEOUS
Qty: 1 ML | Refills: 3 | Status: SHIPPED | OUTPATIENT
Start: 2021-06-18 | End: 2021-09-16

## 2021-06-18 RX ORDER — CLOBETASOL PROPIONATE 0.5 MG/ML
SOLUTION TOPICAL
Qty: 60 ML | Refills: 3 | Status: SHIPPED | OUTPATIENT
Start: 2021-06-18 | End: 2021-08-16

## 2021-06-18 NOTE — NURSING NOTE
To White's goals for this visit include:   Chief Complaint   Patient presents with     Psoriasis     doing well on stelera, triamcinolone ointment, desonide ointment calcipotriene ointment and clobetasol       He requests these members of his care team be copied on today's visit information:     PCP: Annel Yusuf    Referring Provider:  No referring provider defined for this encounter.    There were no vitals taken for this visit.    Do you need any medication refills at today's visit?   Stelara, Clobetasol solution        Sabrina Garcia on 6/18/2021 at 10:17 AM

## 2021-06-18 NOTE — PATIENT INSTRUCTIONS
[facility] Dermatology Visit    Thank you for allowing us to participate in your care. Your findings, instructions and follow-up plan are as follows:         When should I call my doctor?    If you are worsening or not improving, please, contact us or seek urgent care as noted below.     Who should I call with questions (adults)?    [facility] (adult and pediatric): [phone]     [facility] (adult): [phone]    For urgent needs outside of business hours call the [facility] at [phone] and ask for the dermatology resident on call    If this is a medical emergency and you are unable to reach an ER, Call 911      Who should I call with questions (pediatric)?  [facility]- Pediatric Dermatology  Dr. Torri Zimmerman, Dr. Geoff Sifuentes, Dr. Shruthi Ghotra, Ivelisse Hamilton, PA  Dr. Divine Florence, Dr. Aimee Gunn & Dr. Jeremias Gaston  Non Urgent  Nurse Triage Line; [phone]- Kasie and Purvi RN Care Coordinators   Krysta (/Complex ) [phone]    If you need a prescription refill, please contact your pharmacy. Refills are approved or denied by our Physicians during normal business hours, Monday through Fridays  Per office policy, refills will not be granted if you have not been seen within the past year (or sooner depending on your child's condition)    Scheduling Information:  Pediatric Appointment Scheduling and Call Center [phone]  Radiology Scheduling- [phone]  Sedation Unit Scheduling- [phone]  [place] Scheduling- General [phone]; Pediatric Dermatology [phone]  Main  Services: [phone]  [place]: [phone]  Taiwanese: [phone]  Hmong/German/Sami: [phone]  Preadmission Nursing Department Fax Number: [phone] (Fax all pre-operative paperwork to this number)    For urgent matters arising during evenings,  weekends, or holidays that cannot wait for normal business hours please call [phone] and ask for the Dermatology Resident On-Call to be paged.

## 2021-06-18 NOTE — PROGRESS NOTES
[facility] Dermatology Note  Encounter Date: Jun 18, 2021  Office Visit     Dermatology Problem List:  1.Plaque psoriasis: 1-3% TBSA  -Current Tx: Stelara, triamcinolone 0.1% ointment, desonide 0.05% ointment, calcipotriene 0.005% ointment, Clobetasol  Past tx: Soriatane 25 mg daily stopped due to pyogenic granulomas 6/2018, Humira  Quant gold 9/4/2020  2. Onycholysis of distal right toenails, May be in association with psoriasis.    ____________________________________________    Assessment & Plan:    # Plaque psoriasis - chronic and stable.  Has hx of joint pain. On Stelara - patient doing well. Understands risks with this medication including hematologic  - Clobetasol cream BID for up to 2 weeks in a row -no change, for sclap 3 times weekly  - Okay to use dovonex - no change  - Use desonide PRN when active, not active on face or groin.  -Sterlara  -labs ordered he will get in July  COVID vaccinated  Will get flu vaccination this fall  Shingrix vaccinated  Pneumonia vaccine 23 obtained, he is missing prevnar 13, will work to schedule         # Elevated WBC count  - Seen by oncology. They are monitoring.     # Joint pain, chronic still active  - Seen by rheumatology 2/2021    # Scar on left groin. - No pigment.  -Continue to monitor    # Seborrheic Dermatitis  - Clobetasol 3x weekly to the scalp.    Procedures Performed:   None.    Follow-up: Phone visit in 3 months, 6 months in-person, or earlier for new or changing lesions    Staff and Scribe:     Scribe Disclosure:   I, Suzette Skaggs, am serving as a scribe to document services personally performed by this physician, Dr. Jacey Cervantes, based on data collection and the provider's statements to me.     Provider Disclosure:   The documentation recorded by the scribe accurately reflects the services I personally performed and the decisions made by me.    Jacey Cervantes MD    Department of Dermatology  [facility]  [facility]: Phone: [phone], Fax:[phone]  [facility]: Phone: [phone], Fax: [phone]      ____________________________________________    CC: Psoriasis (doing well on stelera, triamcinolone ointment, calcipotriene ointment and clobetasol)    HPI:  Mr. To White is a(n) 51 year old male who presents today as a return patient for psoriasis.    Last seen 1/12/21. At that time, patient was to continue regimen. A hyperpigmented patch was noted to monitor on the crease of the abdomen.     Today, patient notes he is doing well. Is on stelera. Also using TAC, calcipotriene, and clobetasol. History of scalp pimples.    Patient is otherwise feeling well, without additional skin concerns.    Labs Reviewed:  Reviewed hepatic panel from 3/14/21. Have labs taken in September 2021 for phone visit.    Physical Exam:  Vitals: There were no vitals taken for this visit.  SKIN: Focused examination of the left lower abdomen and back was performed.  - There is a scar on the left groin. No pigmented lesion.  - Back is clear.  - No other lesions of concern on areas examined.     Medications:  Current Outpatient Medications   Medication     amLODIPine (NORVASC) 10 MG tablet     benazepril (LOTENSIN) 40 MG tablet     calcipotriene 0.005 % OINT     clobetasol (TEMOVATE) 0.05 % external ointment     clobetasol (TEMOVATE) 0.05 % external solution     Fexofenadine HCl (ALLEGRA PO)     hydrOXYzine (ATARAX) 25 MG tablet     Pseudoephedrine-Ibuprofen (ADVIL COLD/SINUS PO)     ustekinumab (STELARA) 90 MG/ML     No current facility-administered medications for this visit.       Past Medical History:   Patient Active Problem List   Diagnosis     Other psoriasis     Obesity     Essential hypertension with goal blood pressure less than 140/90     Obesity, Class I, BMI 30-34.9     Benign cyst of both testicles     Morbid obesity (H)     Past  Medical History:   Diagnosis Date     Essential hypertension, benign 2000     Hypertension      Obesity      Other psoriasis     since childhood        CC No referring provider defined for this encounter. on close of this encounter.

## 2021-06-18 NOTE — LETTER
6/18/2021         RE: To White  [address] [facility] [address]        Dear Colleague,    Thank you for referring your patient, To White, to the [facility]. Please see a copy of my visit note below.    [facility] Dermatology Note  Encounter Date: Jun 18, 2021  Office Visit     Dermatology Problem List:  1.Plaque psoriasis: 1-3% TBSA  -Current Tx: Stelara, triamcinolone 0.1% ointment, desonide 0.05% ointment, calcipotriene 0.005% ointment, Clobetasol  Past tx: Soriatane 25 mg daily stopped due to pyogenic granulomas 6/2018, Humira  Quant gold 9/4/2020  2. Onycholysis of distal right toenails, May be in association with psoriasis.    ____________________________________________    Assessment & Plan:    # Plaque psoriasis - chronic and stable.  Has hx of joint pain. On Stelara - patient doing well. Understands risks with this medication including hematologic  - Clobetasol cream BID for up to 2 weeks in a row -no change, for sclap 3 times weekly  - Okay to use dovonex - no change  - Use desonide PRN when active, not active on face or groin.  -Sterlara  -labs ordered he will get in July  COVID vaccinated  Will get flu vaccination this fall  Shingrix vaccinated  Pneumonia vaccine 23 obtained, he is missing prevnar 13, will work to schedule         # Elevated WBC count  - Seen by oncology. They are monitoring.     # Joint pain, chronic still active  - Seen by rheumatology 2/2021    # Scar on left groin. - No pigment.  -Continue to monitor    # Seborrheic Dermatitis  - Clobetasol 3x weekly to the scalp.    Procedures Performed:   None.    Follow-up: Phone visit in 3 months, 6 months in-person, or earlier for new or changing lesions    Staff and Scribe:     Scribe Disclosure:   I, Suzette Skaggs, am serving as a scribe to document services personally performed by this physician, Dr. Jacey Cervantes, based on data collection and the provider's statements to me.      Provider Disclosure:   The documentation recorded by the scribe accurately reflects the services I personally performed and the decisions made by me.    Jacey Cervantes MD    Department of Dermatology  [facility]: Phone: [phone], Fax:[phone]  [facility]: Phone: [phone], Fax: [phone]      ____________________________________________    CC: Psoriasis (doing well on stelera, triamcinolone ointment, calcipotriene ointment and clobetasol)    HPI:  Mr. To White is a(n) 51 year old male who presents today as a return patient for psoriasis.    Last seen 1/12/21. At that time, patient was to continue regimen. A hyperpigmented patch was noted to monitor on the crease of the abdomen.     Today, patient notes he is doing well. Is on stelera. Also using TAC, calcipotriene, and clobetasol. History of scalp pimples.    Patient is otherwise feeling well, without additional skin concerns.    Labs Reviewed:  Reviewed hepatic panel from 3/14/21. Have labs taken in September 2021 for phone visit.    Physical Exam:  Vitals: There were no vitals taken for this visit.  SKIN: Focused examination of the left lower abdomen and back was performed.  - There is a scar on the left groin. No pigmented lesion.  - Back is clear.  - No other lesions of concern on areas examined.     Medications:  Current Outpatient Medications   Medication     amLODIPine (NORVASC) 10 MG tablet     benazepril (LOTENSIN) 40 MG tablet     calcipotriene 0.005 % OINT     clobetasol (TEMOVATE) 0.05 % external ointment     clobetasol (TEMOVATE) 0.05 % external solution     Fexofenadine HCl (ALLEGRA PO)     hydrOXYzine (ATARAX) 25 MG tablet     Pseudoephedrine-Ibuprofen (ADVIL COLD/SINUS PO)     ustekinumab (STELARA) 90 MG/ML     No current facility-administered medications for this visit.       Past Medical History:    Patient Active Problem List   Diagnosis     Other psoriasis     Obesity     Essential hypertension with goal blood pressure less than 140/90     Obesity, Class I, BMI 30-34.9     Benign cyst of both testicles     Morbid obesity (H)     Past Medical History:   Diagnosis Date     Essential hypertension, benign 2000     Hypertension      Obesity      Other psoriasis     since childhood        CC No referring provider defined for this encounter. on close of this encounter.      Again, thank you for allowing me to participate in the care of your patient.        Sincerely,        Jacey Cervantes MD

## 2021-07-09 DIAGNOSIS — Z51.81 MEDICATION MONITORING ENCOUNTER: ICD-10-CM

## 2021-07-09 LAB
ALT SERPL W P-5'-P-CCNC: 31 U/L (ref 0–70)
AST SERPL W P-5'-P-CCNC: 16 U/L (ref 0–45)

## 2021-07-09 PROCEDURE — 84450 TRANSFERASE (AST) (SGOT): CPT | Performed by: DERMATOLOGY

## 2021-07-09 PROCEDURE — 36415 COLL VENOUS BLD VENIPUNCTURE: CPT | Performed by: DERMATOLOGY

## 2021-07-09 PROCEDURE — 84460 ALANINE AMINO (ALT) (SGPT): CPT | Performed by: DERMATOLOGY

## 2021-07-09 PROCEDURE — 86481 TB AG RESPONSE T-CELL SUSP: CPT | Performed by: DERMATOLOGY

## 2021-07-12 LAB
GAMMA INTERFERON BACKGROUND BLD IA-ACNC: 0.01 IU/ML
M TB IFN-G CD4+ BCKGRND COR BLD-ACNC: 9.99 IU/ML
M TB TUBERC IFN-G BLD QL: NEGATIVE
MITOGEN IGNF BCKGRD COR BLD-ACNC: 0 IU/ML
MITOGEN IGNF BCKGRD COR BLD-ACNC: 0.02 IU/ML

## 2021-07-21 ENCOUNTER — LAB (OUTPATIENT)
Dept: LAB | Facility: CLINIC | Age: 51
End: 2021-07-21
Payer: COMMERCIAL

## 2021-07-21 DIAGNOSIS — D72.829 LEUKOCYTOSIS, UNSPECIFIED TYPE: ICD-10-CM

## 2021-07-21 LAB
BASOPHILS # BLD AUTO: 0.1 10E3/UL (ref 0–0.2)
BASOPHILS NFR BLD AUTO: 1 %
EOSINOPHIL # BLD AUTO: 0.7 10E3/UL (ref 0–0.7)
EOSINOPHIL NFR BLD AUTO: 6 %
ERYTHROCYTE [DISTWIDTH] IN BLOOD BY AUTOMATED COUNT: 13.3 % (ref 10–15)
HCT VFR BLD AUTO: 43.4 % (ref 40–53)
HGB BLD-MCNC: 14.4 G/DL (ref 13.3–17.7)
IMM GRANULOCYTES # BLD: 0.1 10E3/UL
IMM GRANULOCYTES NFR BLD: 0 %
LYMPHOCYTES # BLD AUTO: 4.4 10E3/UL (ref 0.8–5.3)
LYMPHOCYTES NFR BLD AUTO: 38 %
MCH RBC QN AUTO: 30.7 PG (ref 26.5–33)
MCHC RBC AUTO-ENTMCNC: 33.2 G/DL (ref 31.5–36.5)
MCV RBC AUTO: 93 FL (ref 78–100)
MONOCYTES # BLD AUTO: 0.9 10E3/UL (ref 0–1.3)
MONOCYTES NFR BLD AUTO: 7 %
NEUTROPHILS # BLD AUTO: 5.4 10E3/UL (ref 1.6–8.3)
NEUTROPHILS NFR BLD AUTO: 48 %
NRBC # BLD AUTO: 0 10E3/UL
NRBC BLD AUTO-RTO: 0 /100
PLATELET # BLD AUTO: 389 10E3/UL (ref 150–450)
RBC # BLD AUTO: 4.69 10E6/UL (ref 4.4–5.9)
WBC # BLD AUTO: 11.5 10E3/UL (ref 4–11)

## 2021-07-21 PROCEDURE — 85025 COMPLETE CBC W/AUTO DIFF WBC: CPT

## 2021-07-21 PROCEDURE — 36415 COLL VENOUS BLD VENIPUNCTURE: CPT

## 2021-07-22 NOTE — PROGRESS NOTES
"This patient  is being evaluated via a billable video visit.      The patient has been notified of following:     \"This video visit will be conducted via a call between you and your physician/provider. We have found that certain health care needs can be provided without the need for an in-person physical exam.  This service lets us provide the care you need with a video conversation.  If a prescription is necessary we can send it directly to your pharmacy.  If lab work is needed we can place an order for that and you can then stop by our lab to have the test done at a later time.    Video visits are billed at different rates depending on your insurance coverage.  Please reach out to your insurance provider with any questions.    If during the course of the call the physician/provider feels a video visit is not appropriate, you will not be charged for this service.\"    Patient has given verbal consent for Video visit? yes  Video-Visit Details    Type of service:  Video Visit    Video visit duration:5  min  Originating Location (pt. Location): home    Distant Location (provider location):  [facility]     Platform used for Video Visit: cM Martinez MD        Hematology follow-up visit:  Date on this visit: Jul 29, 2021    Diagnosis: Leukocytosis.      Primary care physician: Annel Yusuf     History Of Present Illness:  Mr. White is a 51 year old male,  with history of psoriasis on Stelara, who presents for follow-up of mild leukocytosis.  He had normal WBC in September 2019 and in February 2020.  He was first noted to have a mildly elevated white blood cell count in September 2021 his white blood cell count was 12.2.  The total white blood cell count has remained mildly elevated since, as below:  Results for ANNE WHITE (MRN [id number]) as of 7/29/2021 10:37   Ref. Range 9/4/2020 08:33 10/23/2020 07:37 1/4/2021 15:59 2/24/2021 15:36 7/21/2021 08:12   WBC Latest Ref " Range: 4.0 - 11.0 10e3/uL 12.2 (H) 12.6 (H) 15.2 (H) 13.0 (H) 11.5 (H)   Absolute neutrophil count has remained normal.  ALC is normal in the high normal range.  On July 21, 2021 he was noted to have 0.1 immature granulocytes on CBC differential.  Additional work-up of leukocytosis in February 2021 demonstrated:  Creatinine, LFTs within normal limits.  BCR/ABL major undetectable.  JAK2, MPL,CALR  mutation analysis negative, TSH normal. ALC high normal at 4.8.  Previously, ALC high normal at 5 in 2020 and 5.3 in January 2021.  Peripheral blood smear showed slight leukocytosis with borderline eosinophilia.  Lymphocytes appeared polymorphous and neutrophils were unremarkable in morphology.  We have reviewed his most recent CBC differential from July 21, 2021.  We have also reviewed flow cytometry from July 26, 2021.  It showed polytypic B cells and no aberrant immunophenotype on T cells.  There were rare to absent blasts.  There was no immunophenotypic evidence of non-Hodgkin lymphoma, lymphoid leukemia or acute leukemia. There was a population of CD3+ CD8+ CD57+ T cells which could represent large granular lymphocytes. This population represented a slightly larger proportion of leukocytes than would be usually seen in the peripheral blood but at the a normal immunophenotype and they are polytypic in TCR expression based on TCRcB1 staining, supporting reactive nature. These immunophenotypic findings may represent reactive expansion of a normal T-cell subset.    Creat and LFTs have remained normal.  He is on Stelara for plaque psoriasis.  He reports being on Stelara for the last couple of years.  He follows with Dr. Cervantes.  He is also on triamcinolone, desonide and calcipotriene topically.  I have reviewed Dr. Cervantes's note from June 18, 2021.  She was seen by rheumatology on February 24, 2021 by Dr. العلي, for evaluation of shoulder joint pains.  I have reviewed her note from that visit.  He was felt to have  "degenerative arthritis.  He has had no recent infections.  He has had no constitutional symptoms such as fevers chills or night sweats.  In addition, a complete 12 point  review of systems is negative.    Past Medical/Surgical History:  Past Medical History:   Diagnosis Date     Essential hypertension, benign 2000     Hypertension      Obesity      Other psoriasis     since childhood     Past Surgical History:   Procedure Laterality Date     CHOLECYSTECTOMY       LAPAROSCOPIC CHOLECYSTECTOMY  7/3/2012    Procedure: LAPAROSCOPIC CHOLECYSTECTOMY;  Laparoscopic Cholecystectomy;  Surgeon: Kulwinder Hollins MD;  Location:  OR     NO HISTORY OF SURGERY       Allergies:  Allergies as of 07/29/2021 - Reviewed 07/15/2021   Allergen Reaction Noted     No known allergies  12/19/2002     Current Medications:  Current Outpatient Medications   Medication     amLODIPine (NORVASC) 10 MG tablet     benazepril (LOTENSIN) 40 MG tablet     calcipotriene 0.005 % OINT     clobetasol (TEMOVATE) 0.05 % external ointment     clobetasol (TEMOVATE) 0.05 % external solution     Fexofenadine HCl (ALLEGRA PO)     hydrOXYzine (ATARAX) 25 MG tablet     Pseudoephedrine-Ibuprofen (ADVIL COLD/SINUS PO)     ustekinumab (STELARA) 90 MG/ML     clobetasol (TEMOVATE) 0.05 % external solution     No current facility-administered medications for this visit.         Physical Exam:  Ht 1.892 m (6' 2.5\")   Wt 117.9 kg (260 lb)   BMI 32.94 kg/m      Wt Readings from Last 5 Encounters:   07/15/21 117.9 kg (260 lb)   02/24/21 127 kg (279 lb 14.4 oz)   02/17/20 119.1 kg (262 lb 9.6 oz)   12/26/18 125.6 kg (277 lb)   01/19/18 117.5 kg (259 lb)       Constitutional: alert and in no distress  Eyes: No redness or discharge  Respiratory: No cough or labored breathing.  Musculoskeletal: Full range of motion in extremities.  Skin: no visible skin lesions or discoloration  Neurological: No tremors and denies headache.  Psychiatric: Mentation appears normal and affect " is normal as well.  Alert and oriented x3.  The rest the comprehensive physical examination is deferred due to public health emergency video visit restrictions.    Labs:  Labs reviewed and documented in the EMR.    ASSESSMENT/PLAN:  To is a very pleasant 51-year-old gentleman with history of plaque psoriasis, on Stelara, with mild stable leukocytosis since September 2020, with normal absolute differential counts, with the exception of 0.1 absolute immature granulocytes noted on the differential in July 2021.  He has had negative NGS on peripheral blood for mutations associated with chronic myeloproliferative disorders.  Flow cytometry showed normal immunophenotype with a subpopulation of cells which could could represent large granular lymphocytes. These immunophenotypic findings may represent reactive expansion of a normal T-cell subset, perhaps associated with his plaque psoriasis.  Nevertheless, I would recommend continued follow-up on his CBC with differential and ensure he does not develop worsening leukocytosis or any other abnormalities.  If that is the case, he will likely need a bone marrow biopsy.      1.  Leukocytosis with possible large granular lymphocytes/T-cell expansion -we will plan to follow-up with the patient in 6 months, with CBC differential, peripheral blood smear, and we will reassess for T-cell clonality with T-cell receptor gene rearrangement studies.  I will see the patient back at that time.  At the end of our visit patient verbalized understanding and concurred with the plan.    2.  Plaque psoriasis-continue on Stelara and current topical regimen as above.  Follow-up with Dr. Cervantes.  Chart documentation completed in part with Dragon voice-recognition software.  Even though reviewed some grammatical, spelling, and word errors may remain.

## 2021-07-26 ENCOUNTER — LAB (OUTPATIENT)
Dept: LAB | Facility: CLINIC | Age: 51
End: 2021-07-26
Payer: COMMERCIAL

## 2021-07-26 DIAGNOSIS — D72.829 LEUKOCYTOSIS, UNSPECIFIED TYPE: ICD-10-CM

## 2021-07-26 PROCEDURE — 36415 COLL VENOUS BLD VENIPUNCTURE: CPT

## 2021-07-26 PROCEDURE — 88184 FLOWCYTOMETRY/ TC 1 MARKER: CPT

## 2021-07-26 PROCEDURE — 88189 FLOWCYTOMETRY/READ 16 & >: CPT

## 2021-07-26 PROCEDURE — 88185 FLOWCYTOMETRY/TC ADD-ON: CPT

## 2021-07-27 LAB
PATH REPORT.COMMENTS IMP SPEC: NORMAL
PATH REPORT.FINAL DX SPEC: NORMAL
PATH REPORT.MICROSCOPIC SPEC OTHER STN: NORMAL
PATH REPORT.RELEVANT HX SPEC: NORMAL

## 2021-07-29 ENCOUNTER — VIRTUAL VISIT (OUTPATIENT)
Dept: ONCOLOGY | Facility: CLINIC | Age: 51
End: 2021-07-29
Payer: COMMERCIAL

## 2021-07-29 VITALS — WEIGHT: 260 LBS | HEIGHT: 75 IN | BODY MASS INDEX: 32.33 KG/M2

## 2021-07-29 DIAGNOSIS — D72.829 LEUKOCYTOSIS, UNSPECIFIED TYPE: Primary | ICD-10-CM

## 2021-07-29 PROCEDURE — 99214 OFFICE O/P EST MOD 30 MIN: CPT | Mod: 95 | Performed by: INTERNAL MEDICINE

## 2021-07-29 ASSESSMENT — MIFFLIN-ST. JEOR: SCORE: 2117.04

## 2021-07-29 ASSESSMENT — PAIN SCALES - GENERAL: PAINLEVEL: NO PAIN (0)

## 2021-07-29 NOTE — LETTER
"    7/29/2021         RE: To White  [address] [address] [place] [id number]        Dear Colleague,    Thank you for referring your patient, To White, to the [facility]. Please see a copy of my visit note below.    This patient  is being evaluated via a billable video visit.      The patient has been notified of following:     \"This video visit will be conducted via a call between you and your physician/provider. We have found that certain health care needs can be provided without the need for an in-person physical exam.  This service lets us provide the care you need with a video conversation.  If a prescription is necessary we can send it directly to your pharmacy.  If lab work is needed we can place an order for that and you can then stop by our lab to have the test done at a later time.    Video visits are billed at different rates depending on your insurance coverage.  Please reach out to your insurance provider with any questions.    If during the course of the call the physician/provider feels a video visit is not appropriate, you will not be charged for this service.\"    Patient has given verbal consent for Video visit? yes  Video-Visit Details    Type of service:  Video Visit    Video visit duration:5  min  Originating Location (pt. Location): home    Distant Location (provider location):  [facility]     Platform used for Video Visit: Mc Martinez MD        Hematology follow-up visit:  Date on this visit: Jul 29, 2021    Diagnosis: Leukocytosis.      Primary care physician: Annel Yusuf     History Of Present Illness:  Mr. White is a 51 year old male,  with history of psoriasis on Stelara, who presents for follow-up of mild leukocytosis.  He had normal WBC in September 2019 and in February 2020.  He was first noted to have a mildly elevated white blood cell count in September 2021 his white blood cell count was 12.2. "  The total white blood cell count has remained mildly elevated since, as below:  Results for ANNE CASTRO (MRN [id number]) as of 7/29/2021 10:37   Ref. Range 9/4/2020 08:33 10/23/2020 07:37 1/4/2021 15:59 2/24/2021 15:36 7/21/2021 08:12   WBC Latest Ref Range: 4.0 - 11.0 10e3/uL 12.2 (H) 12.6 (H) 15.2 (H) 13.0 (H) 11.5 (H)   Absolute neutrophil count has remained normal.  ALC is normal in the high normal range.  On July 21, 2021 he was noted to have 0.1 immature granulocytes on CBC differential.  Additional work-up of leukocytosis in February 2021 demonstrated:  Creatinine, LFTs within normal limits.  BCR/ABL major undetectable.  JAK2, MPL,CALR  mutation analysis negative, TSH normal. ALC high normal at 4.8.  Previously, ALC high normal at 5 in 2020 and 5.3 in January 2021.  Peripheral blood smear showed slight leukocytosis with borderline eosinophilia.  Lymphocytes appeared polymorphous and neutrophils were unremarkable in morphology.  We have reviewed his most recent CBC differential from July 21, 2021.  We have also reviewed flow cytometry from July 26, 2021.  It showed polytypic B cells and no aberrant immunophenotype on T cells.  There were rare to absent blasts.  There was no immunophenotypic evidence of non-Hodgkin lymphoma, lymphoid leukemia or acute leukemia. There was a population of CD3+ CD8+ CD57+ T cells which could represent large granular lymphocytes. This population represented a slightly larger proportion of leukocytes than would be usually seen in the peripheral blood but at the a normal immunophenotype and they are polytypic in TCR expression based on TCRcB1 staining, supporting reactive nature. These immunophenotypic findings may represent reactive expansion of a normal T-cell subset.    Creat and LFTs have remained normal.  He is on Stelara for plaque psoriasis.  He reports being on Stelara for the last couple of years.  He follows with Dr. Cervantes.  He is also on triamcinolone, desonide and  "calcipotriene topically.  I have reviewed Dr. Cervantes's note from June 18, 2021.  She was seen by rheumatology on February 24, 2021 by Dr. العلي, for evaluation of shoulder joint pains.  I have reviewed her note from that visit.  He was felt to have degenerative arthritis.  He has had no recent infections.  He has had no constitutional symptoms such as fevers chills or night sweats.  In addition, a complete 12 point  review of systems is negative.    Past Medical/Surgical History:  Past Medical History:   Diagnosis Date     Essential hypertension, benign 2000     Hypertension      Obesity      Other psoriasis     since childhood     Past Surgical History:   Procedure Laterality Date     CHOLECYSTECTOMY       LAPAROSCOPIC CHOLECYSTECTOMY  7/3/2012    Procedure: LAPAROSCOPIC CHOLECYSTECTOMY;  Laparoscopic Cholecystectomy;  Surgeon: Kulwinder Hollins MD;  Location:  OR     NO HISTORY OF SURGERY       Allergies:  Allergies as of 07/29/2021 - Reviewed 07/15/2021   Allergen Reaction Noted     No known allergies  12/19/2002     Current Medications:  Current Outpatient Medications   Medication     amLODIPine (NORVASC) 10 MG tablet     benazepril (LOTENSIN) 40 MG tablet     calcipotriene 0.005 % OINT     clobetasol (TEMOVATE) 0.05 % external ointment     clobetasol (TEMOVATE) 0.05 % external solution     Fexofenadine HCl (ALLEGRA PO)     hydrOXYzine (ATARAX) 25 MG tablet     Pseudoephedrine-Ibuprofen (ADVIL COLD/SINUS PO)     ustekinumab (STELARA) 90 MG/ML     clobetasol (TEMOVATE) 0.05 % external solution     No current facility-administered medications for this visit.         Physical Exam:  Ht 1.892 m (6' 2.5\")   Wt 117.9 kg (260 lb)   BMI 32.94 kg/m      Wt Readings from Last 5 Encounters:   07/15/21 117.9 kg (260 lb)   02/24/21 127 kg (279 lb 14.4 oz)   02/17/20 119.1 kg (262 lb 9.6 oz)   12/26/18 125.6 kg (277 lb)   01/19/18 117.5 kg (259 lb)       Constitutional: alert and in no distress  Eyes: No redness or " discharge  Respiratory: No cough or labored breathing.  Musculoskeletal: Full range of motion in extremities.  Skin: no visible skin lesions or discoloration  Neurological: No tremors and denies headache.  Psychiatric: Mentation appears normal and affect is normal as well.  Alert and oriented x3.  The rest the comprehensive physical examination is deferred due to public health emergency video visit restrictions.    Labs:  Labs reviewed and documented in the EMR.    ASSESSMENT/PLAN:  To is a very pleasant 51-year-old gentleman with history of plaque psoriasis, on Stelara, with mild stable leukocytosis since September 2020, with normal absolute differential counts, with the exception of 0.1 absolute immature granulocytes noted on the differential in July 2021.  He has had negative NGS on peripheral blood for mutations associated with chronic myeloproliferative disorders.  Flow cytometry showed normal immunophenotype with a subpopulation of cells which could could represent large granular lymphocytes. These immunophenotypic findings may represent reactive expansion of a normal T-cell subset, perhaps associated with his plaque psoriasis.  Nevertheless, I would recommend continued follow-up on his CBC with differential and ensure he does not develop worsening leukocytosis or any other abnormalities.  If that is the case, he will likely need a bone marrow biopsy.      1.  Leukocytosis with possible large granular lymphocytes/T-cell expansion -we will plan to follow-up with the patient in 6 months, with CBC differential, peripheral blood smear, and we will reassess for T-cell clonality with T-cell receptor gene rearrangement studies.  I will see the patient back at that time.  At the end of our visit patient verbalized understanding and concurred with the plan.    2.  Plaque psoriasis-continue on Stelara and current topical regimen as above.  Follow-up with Dr. Cervantes.  Chart documentation completed in part with Juan  voice-recognition software.  Even though reviewed some grammatical, spelling, and word errors may remain.            Again, thank you for allowing me to participate in the care of your patient.        Sincerely,        Kelsey Martinez MD, MD

## 2021-08-16 ENCOUNTER — VIRTUAL VISIT (OUTPATIENT)
Dept: FAMILY MEDICINE | Facility: OTHER | Age: 51
End: 2021-08-16
Payer: COMMERCIAL

## 2021-08-16 DIAGNOSIS — I10 ESSENTIAL HYPERTENSION WITH GOAL BLOOD PRESSURE LESS THAN 140/90: ICD-10-CM

## 2021-08-16 PROCEDURE — 99213 OFFICE O/P EST LOW 20 MIN: CPT | Mod: 95 | Performed by: PHYSICIAN ASSISTANT

## 2021-08-16 RX ORDER — AMLODIPINE BESYLATE 10 MG/1
10 TABLET ORAL DAILY
Qty: 90 TABLET | Refills: 3 | Status: SHIPPED | OUTPATIENT
Start: 2021-08-16 | End: 2022-06-25

## 2021-08-16 RX ORDER — BENAZEPRIL HYDROCHLORIDE 40 MG/1
40 TABLET ORAL DAILY
Qty: 90 TABLET | Refills: 3 | Status: SHIPPED | OUTPATIENT
Start: 2021-08-16 | End: 2022-06-25

## 2021-08-16 NOTE — PROGRESS NOTES
"To is a 51 year old who is being evaluated via a billable video visit.      How would you like to obtain your AVS? MyChart  If the video visit is dropped, the invitation should be resent by: Text to cell phone: [phone]  Will anyone else be joining your video visit? No      Video Start Time: 3:00 PM    Assessment & Plan     Essential hypertension with goal blood pressure less than 140/90  At goal according to his most recent set of vitals which was from February.  He will make a physical appointment in person in the near future to ensure his blood pressure is adequately controlled  - amLODIPine (NORVASC) 10 MG tablet; Take 1 tablet (10 mg) by mouth daily  - benazepril (LOTENSIN) 40 MG tablet; Take 1 tablet (40 mg) by mouth daily  - Comprehensive metabolic panel (BMP + Alb, Alk Phos, ALT, AST, Total. Bili, TP); Future             BMI:   Estimated body mass index is 32.94 kg/m  as calculated from the following:    Height as of 7/29/21: 1.892 m (6' 2.5\").    Weight as of 7/29/21: 117.9 kg (260 lb).   Unable to assess on virtual visit        Return in about 3 months (around 11/16/2021) for Physical Exam.    Loren Conti PA-C  [facility]   To is a 51 year old who presents for the following health issues     HPI     Medication Followup of Amlodipine     Taking Medication as prescribed: yes    Side Effects:  None    Medication Helping Symptoms:  yes     Medication Followup of Benazepril    Taking Medication as prescribed: yes    Side Effects:  None    Medication Helping Symptoms:  yes     He reports he has been on this regimen for his blood pressure for quite some time.  It works well, he has had no side effects.  He does check his blood pressures randomly outside of the clinic and they are typically in the 120s over 70s to 80s.  Review of Systems   He denies any chest pains, shortness of breath, or pedal edema      Objective           Vitals:  No vitals were obtained today " due to virtual visit.    Physical Exam   GENERAL: Healthy, alert and no distress  EYES: Eyes grossly normal to inspection.  No discharge or erythema, or obvious scleral/conjunctival abnormalities.  RESP: No audible wheeze, cough, or visible cyanosis.  No visible retractions or increased work of breathing.    SKIN: Visible skin clear. No significant rash, abnormal pigmentation or lesions.  NEURO: Cranial nerves grossly intact.  Mentation and speech appropriate for age.  PSYCH: Mentation appears normal, affect normal/bright, judgement and insight intact, normal speech and appearance well-groomed.    Lab on 07/26/2021   Component Date Value Ref Range Status     Case Report 07/26/2021    Final                    Value:Flow Cytometry Report                             Case: [id number]                                  Authorizing Provider:  Kelsey Martinez MD      Collected:           07/26/2021 07:55 AM          Ordering Location:     [facility]   Received:            07/26/2021 07:55 AM                                 [facility]                                                           Pathologist:           Margarita Kincaid MD                                                        Specimen:    Blood, Venous                                                                               Flow Interpretation 07/26/2021    Final                    Value:This result contains rich text formatting which cannot be displayed here.     Comment 07/26/2021    Final                    Value:This result contains rich text formatting which cannot be displayed here.     Flow Phenotypic Data 07/26/2021    Final                    Value:This result contains rich text formatting which cannot be displayed here.     Flow Processing Information 07/26/2021    Final                    Value:This result contains rich text formatting which cannot be displayed here.     Clinical Information 07/26/2021    Final                     Value:This result contains rich text formatting which cannot be displayed here.     FDA Disclaimer 07/26/2021    Final                    Value:This result contains rich text formatting which cannot be displayed here.     Performing Labs 07/26/2021    Final                    Value:This result contains rich text formatting which cannot be displayed here.               Video-Visit Details    Type of service:  Video Visit    Video End Time:3:16 PM    Originating Location (pt. Location): Home    Distant Location (provider location):  [facility]     Platform used for Video Visit: [employer]

## 2021-08-18 ENCOUNTER — TELEPHONE (OUTPATIENT)
Dept: DERMATOLOGY | Facility: CLINIC | Age: 51
End: 2021-08-18

## 2021-08-18 NOTE — TELEPHONE ENCOUNTER
Called [facility]. They state they received the directions already, no further questions or concerns.         Sabrina Garcia on 8/18/2021 at 2:24 PM

## 2021-08-18 NOTE — TELEPHONE ENCOUNTER
[facility] Call Center    Phone Message    May a detailed message be left on voicemail: yes     Reason for Call: Medication Question or concern regarding medication   Prescription Clarification  Name of Medication:   ustekinumab (STELARA) 90 MG/ML    Prescribing Provider:   Dr Cervantes     Pharmacy:   [facility]     What on the order needs clarification?     Pharmacy called to verify directions. Directions do not match the PA and need to match the PA.    Please call to clarify      Action Taken: Message routed to:  Adult Clinics: Dermatology p [phone]    Travel Screening: Not Applicable

## 2021-09-04 ENCOUNTER — HEALTH MAINTENANCE LETTER (OUTPATIENT)
Age: 51
End: 2021-09-04

## 2021-09-16 ENCOUNTER — VIRTUAL VISIT (OUTPATIENT)
Dept: DERMATOLOGY | Facility: CLINIC | Age: 51
End: 2021-09-16
Payer: COMMERCIAL

## 2021-09-16 DIAGNOSIS — L40.9 PSORIASIS: Primary | ICD-10-CM

## 2021-09-16 PROCEDURE — 99214 OFFICE O/P EST MOD 30 MIN: CPT | Mod: 95 | Performed by: DERMATOLOGY

## 2021-09-16 RX ORDER — APREMILAST 10-20-30MG
KIT ORAL
Qty: 55 TABLET | Refills: 0 | Status: SHIPPED | OUTPATIENT
Start: 2021-09-16 | End: 2022-06-27

## 2021-09-16 RX ORDER — APREMILAST 30 MG/1
30 TABLET, FILM COATED ORAL 2 TIMES DAILY
Qty: 60 TABLET | Refills: 11 | Status: SHIPPED | OUTPATIENT
Start: 2021-09-16 | End: 2021-12-17

## 2021-09-16 NOTE — LETTER
9/16/2021         RE: To White  [address] [place] [address]        Dear Colleague,    Thank you for referring your patient, To White, to the [facility]. Please see a copy of my visit note below.    [facility] Dermatology Note  Encounter Date: Sep 16, 2021  Store-and-Forward and Telephone ([phone]). Location of teledermatologist: [facility].  Start time: 12:00. End time: 12:33pm    Dermatology Problem List:  1.Plaque psoriasis: 1-3% TBSA  -Current Tx: Stelara, triamcinolone 0.1% ointment, desonide 0.05% ointment, calcipotriene 0.005% ointment, Clobetasol  Past tx: Soriatane 25 mg daily stopped due to pyogenic granulomas 6/2018, Humira,   Quant gold 9/4/2020    2. Onycholysis of distal right toenails, May be in association with psoriasis.    3. Seborrheic Dermatitis  -Current Tx: clobetasol, ketoconazole 2% shampoo  ____________________________________________    Assessment & Plan:   # Plaque psoriasis - chronic and stable.  On stelara but following with heme onc and im noting T cell studies being ordered. Reviewed risks of biologics and immunosuppression and cancer.   - Clobetasol cream BID for up to 2 weeks in a row -no change, for sclap 3 times weekly  - Okay to use dovonex - no change  - Use desonide PRN when active, not active on face or groin.  -Stelara can be continued for 8 weeks then transition to otezla (last 2 weeks the stelara is active)  - hold otezla in 8 weeks. Discussed with patient risks and benefits of Apremilast. Discussed risks including GI upset, nausea, vomiting, diarrhea, depression, weight loss, headaches, and URI. The patient denies a history of depression, current changes in mood or suicidal ideation. The patient will contact clinic if they experience any changes in mood. We obtained a baseline weight (260) and will monitor weight closely. Medication list was reviewed for drug  "interactions(including CYP-450 inducers) and none were noted. The patient is not pregnant or breastfeeding. The patient was instructed not to crush, chew or split the tablet and that the tab may be taken with or without food. Dosing will be as follows: Day 1: 10 mg in morning, Day 2: 10 mg in morning and 10 mg in evening, Day 3: 10 mg in morning and 20 mg in evening, Day 4: 20 mg in morning and 20 mg in evening, Day 5: 20 mg in morning and 30 mg in evening, Day 6 and thereafter: 30 mg twice daily. Handout on medication provided to patient.            Vaccination summary  COVID vaccinated-  Has nichole and nichole  -Will get flu vaccination this fall  -Shingrix vaccinated  -Pneumonia vaccine 23 obtained, he is missing prevnar 13 and is currently working to schedule it (there are future orders for it)     # Elevated WBC count  - \"Seen by oncology 7/29/21. They are monitoring and currently have not found anything concerning\"  -reviewed record and noticed T cell issues are possible       # Joint pain, chronic still active  - Seen by rheumatology 2/2021; been feeling good recently and controls pain with exercise and OTC pain medications     # Scar on left groin. - No pigment.  -recheck at next in person visit in 6 months     # Seborrheic Dermatitis  - Clobetasol 3x weekly to the scalp, no change, can do for a full 1-2 weeks        Procedures Performed:    None    Follow-up: 2 months, labs this week    Staff and Medical Student:     Almaz Mcrae, MS4    Staff Physician:  I was present with the medical student who participated in the service and in the documentation of the note. I have verified the history and personally performed the physical exam and medical decision making. I agree with the assessment and plan of care as documented in the note.        Jacey Cervantes MD    Department of Dermatology  [facility]: Phone: [phone], " Fax:[phone]  [facility]: Phone: [phone], Fax: [phone]  9/16/2021    ____________________________________________    CC: Psoriasis (stable)    HPI:  Mr. To White is a(n) 51 year old male who presents today for follow-up  for plaque psoriasis.  He was last seen on 6/18/2021.  He states his psoriasis is well controlled. Currently on Stelara, he uses the ointments as needed but hasn't needed to use them. He has not yet gotten his prevnar 13 or flu vaccine yet, but is working on getting those appointments scheduled.  He met with oncology in July to monitor his WBC count.  They haven't found anything concerning.  His joint pain is well controlled with exercise and OTC pain medications.  The scar on his left groin that he is monitoring has not changed.    He states his seborrheic dermatitis has not improved with the clobetasol use.      Patient is otherwise feeling well, without additional skin concerns.    Labs Reviewed:  Component      Latest Ref Rng & Units 7/21/2021   WBC      4.0 - 11.0 10e3/uL 11.5 (H)   RBC Count      4.40 - 5.90 10e6/uL 4.69   Hemoglobin      13.3 - 17.7 g/dL 14.4   Hematocrit      40.0 - 53.0 % 43.4   MCV      78 - 100 fL 93   MCH      26.5 - 33.0 pg 30.7   MCHC      31.5 - 36.5 g/dL 33.2   RDW      10.0 - 15.0 % 13.3   Platelet Count      150 - 450 10e3/uL 389   % Neutrophils      % 48   % Lymphocytes      % 38   % Monocytes      % 7   % Eosinophils      % 6   % Basophils      % 1   % Immature Granulocytes      % 0   NRBCs per 100 WBC      <1 /100 0   Absolute Neutrophils      1.6 - 8.3 10e3/uL 5.4   Absolute Lymphocytes      0.8 - 5.3 10e3/uL 4.4   Absolute Monocytes      0.0 - 1.3 10e3/uL 0.9   Absolute Eosinophils      0.0 - 0.7 10e3/uL 0.7   Absolute Basophils      0.0 - 0.2 10e3/uL 0.1   Absolute Immature Granulocytes      <=0.0 10e3/uL 0.1 (H)   Absolute NRBCs      10e3/uL 0.0   MTB Quantiferon Result      NEG:Negative     TB1 Ag minus Nil      IU/mL    TB2 Ag minus Nil      IU/mL    Mitogen minus Nil      IU/mL    Nil Result      IU/mL    AST      0 - 45 U/L    ALT      0 - 70 U/L        Physical Exam:  Vitals: There were no vitals taken for this visit.  SKIN: Teledermatology photos were reviewed; image quality and interpretability: acceptable. Image date: 9/13/21.  - Photos of abdomen, left torso, and back reviewed.  No visible plaques.   - No other lesions of concern on areas examined.     Medications:  Current Outpatient Medications   Medication     amLODIPine (NORVASC) 10 MG tablet     benazepril (LOTENSIN) 40 MG tablet     calcipotriene 0.005 % OINT     clobetasol (TEMOVATE) 0.05 % external ointment     clobetasol (TEMOVATE) 0.05 % external solution     Fexofenadine HCl (ALLEGRA PO)     hydrOXYzine (ATARAX) 25 MG tablet     ustekinumab (STELARA) 90 MG/ML     No current facility-administered medications for this visit.      Past Medical/Surgical History:   Patient Active Problem List   Diagnosis     Other psoriasis     Obesity     Essential hypertension with goal blood pressure less than 140/90     Obesity, Class I, BMI 30-34.9     Benign cyst of both testicles     Morbid obesity (H)     Past Medical History:   Diagnosis Date     Essential hypertension, benign 2000     Hypertension      Obesity      Other psoriasis     since childhood       CC No referring provider defined for this encounter. on close of this encounter.    Teledermatology Nurse Call Patients:   Chief Complaint   Patient presents with     Psoriasis     stable     Are you in the [facility] at the time of the encounter? yes    Today's visit will be billed to you and your insurance.    A teledermatology visit is not as thorough as an in-person visit and the quality of the photograph sent may not be of the same quality as that taken by the dermatology clinic.  Roxanne Cuevas, RENARD on 9/16/2021 at 10:54 AM        Again, thank you for allowing me to  participate in the care of your patient.        Sincerely,        Jacey Cervantes MD

## 2021-09-16 NOTE — PATIENT INSTRUCTIONS
[facility] Dermatology Visit    Thank you for allowing us to participate in your care. Your findings, instructions and follow-up plan are as follows:         When should I call my doctor?    If you are worsening or not improving, please, contact us or seek urgent care as noted below.     Who should I call with questions (adults)?    [facility] (adult and pediatric): [phone]    [facility] (adult): [phone]    For urgent needs outside of business hours call the [facility] at [phone] and ask for the dermatology resident on call    If this is a medical emergency and you are unable to reach an ER, Call 911    Who should I call with questions (pediatric)?  [facility]- Pediatric Dermatology  Dr. Torri Zimmerman, Dr. Geoff Sifuentes, Dr. Shruthi Ghotra, Ivelisse Hamilton, PA  Dr. Divine Florence, Dr. Aimee Gunn & Dr. Jeremias Gaston  Non Urgent  Nurse Triage Line; [phone]- Kasie and Purvi ORTIZ Care Coordinators   Krysta (/Complex ) [phone]    If you need a prescription refill, please contact your pharmacy. Refills are approved or denied by our physicians during normal business hours, Monday through Fridays  Per office policy, refills will not be granted if you have not been seen within the past year (or sooner depending on your child's condition).    Scheduling Information:  Pediatric Appointment Scheduling and Call Center [phone]  Radiology Scheduling- [phone]  Sedation Unit Scheduling- [phone]  [place] Scheduling- General [phone]; Pediatric Dermatology [phone]  Main  Services: [phone]  Urdu: [phone]  Gibraltarian: [phone]  Hmong/Bengali/Wolof: [phone]  Preadmission Nursing Department Fax Number: [phone] (fax all pre-operative paperwork to this number)    For urgent matters arising during evenings,  weekends, or holidays that cannot wait for normal business hours please call [phone] and ask for the dermatology resident on call to be paged.

## 2021-09-16 NOTE — PROGRESS NOTES
[facility] Dermatology Note  Encounter Date: Sep 16, 2021  Store-and-Forward and Telephone ([phone]). Location of teledermatologist: [facility].  Start time: 12:00. End time: 12:33pm    Dermatology Problem List:  1.Plaque psoriasis: 1-3% TBSA  -Current Tx: Stelara, triamcinolone 0.1% ointment, desonide 0.05% ointment, calcipotriene 0.005% ointment, Clobetasol  Past tx: Soriatane 25 mg daily stopped due to pyogenic granulomas 6/2018, Humira,   Quant gold 9/4/2020    2. Onycholysis of distal right toenails, May be in association with psoriasis.    3. Seborrheic Dermatitis  -Current Tx: clobetasol, ketoconazole 2% shampoo  ____________________________________________    Assessment & Plan:   # Plaque psoriasis - chronic and stable.  On stelara but following with heme onc and im noting T cell studies being ordered. Reviewed risks of biologics and immunosuppression and cancer.   - Clobetasol cream BID for up to 2 weeks in a row -no change, for sclap 3 times weekly  - Okay to use dovonex - no change  - Use desonide PRN when active, not active on face or groin.  -Stelara can be continued for 8 weeks then transition to otezla (last 2 weeks the stelara is active)  - hold otezla in 8 weeks. Discussed with patient risks and benefits of Apremilast. Discussed risks including GI upset, nausea, vomiting, diarrhea, depression, weight loss, headaches, and URI. The patient denies a history of depression, current changes in mood or suicidal ideation. The patient will contact clinic if they experience any changes in mood. We obtained a baseline weight (260) and will monitor weight closely. Medication list was reviewed for drug interactions(including CYP-450 inducers) and none were noted. The patient is not pregnant or breastfeeding. The patient was instructed not to crush, chew or split the tablet and that the tab may be taken with or without food. Dosing will be as follows: Day 1: 10  "mg in morning, Day 2: 10 mg in morning and 10 mg in evening, Day 3: 10 mg in morning and 20 mg in evening, Day 4: 20 mg in morning and 20 mg in evening, Day 5: 20 mg in morning and 30 mg in evening, Day 6 and thereafter: 30 mg twice daily. Handout on medication provided to patient.            Vaccination summary  COVID vaccinated-  Has nichole and nichole  -Will get flu vaccination this fall  -Shingrix vaccinated  -Pneumonia vaccine 23 obtained, he is missing prevnar 13 and is currently working to schedule it (there are future orders for it)     # Elevated WBC count  - \"Seen by oncology 7/29/21. They are monitoring and currently have not found anything concerning\"  -reviewed record and noticed T cell issues are possible       # Joint pain, chronic still active  - Seen by rheumatology 2/2021; been feeling good recently and controls pain with exercise and OTC pain medications     # Scar on left groin. - No pigment.  -recheck at next in person visit in 6 months     # Seborrheic Dermatitis  - Clobetasol 3x weekly to the scalp, no change, can do for a full 1-2 weeks        Procedures Performed:    None    Follow-up: 2 months, labs this week    Staff and Medical Student:     Almaz Mcrae, MS4    Staff Physician:  I was present with the medical student who participated in the service and in the documentation of the note. I have verified the history and personally performed the physical exam and medical decision making. I agree with the assessment and plan of care as documented in the note.        Jacey Cervantes MD    Department of Dermatology  [facility]: Phone: [phone], Fax:[phone]  [facility]: Phone: [phone], Fax: [phone]  9/16/2021    ____________________________________________    CC: Psoriasis (stable)    HPI:  Mr. To White is a(n) 51 year old male who presents today " for follow-up  for plaque psoriasis.  He was last seen on 6/18/2021.  He states his psoriasis is well controlled. Currently on Stelara, he uses the ointments as needed but hasn't needed to use them. He has not yet gotten his prevnar 13 or flu vaccine yet, but is working on getting those appointments scheduled.  He met with oncology in July to monitor his WBC count.  They haven't found anything concerning.  His joint pain is well controlled with exercise and OTC pain medications.  The scar on his left groin that he is monitoring has not changed.    He states his seborrheic dermatitis has not improved with the clobetasol use.      Patient is otherwise feeling well, without additional skin concerns.    Labs Reviewed:  Component      Latest Ref Rng & Units 7/21/2021   WBC      4.0 - 11.0 10e3/uL 11.5 (H)   RBC Count      4.40 - 5.90 10e6/uL 4.69   Hemoglobin      13.3 - 17.7 g/dL 14.4   Hematocrit      40.0 - 53.0 % 43.4   MCV      78 - 100 fL 93   MCH      26.5 - 33.0 pg 30.7   MCHC      31.5 - 36.5 g/dL 33.2   RDW      10.0 - 15.0 % 13.3   Platelet Count      150 - 450 10e3/uL 389   % Neutrophils      % 48   % Lymphocytes      % 38   % Monocytes      % 7   % Eosinophils      % 6   % Basophils      % 1   % Immature Granulocytes      % 0   NRBCs per 100 WBC      <1 /100 0   Absolute Neutrophils      1.6 - 8.3 10e3/uL 5.4   Absolute Lymphocytes      0.8 - 5.3 10e3/uL 4.4   Absolute Monocytes      0.0 - 1.3 10e3/uL 0.9   Absolute Eosinophils      0.0 - 0.7 10e3/uL 0.7   Absolute Basophils      0.0 - 0.2 10e3/uL 0.1   Absolute Immature Granulocytes      <=0.0 10e3/uL 0.1 (H)   Absolute NRBCs      10e3/uL 0.0   MTB Quantiferon Result      NEG:Negative    TB1 Ag minus Nil      IU/mL    TB2 Ag minus Nil      IU/mL    Mitogen minus Nil      IU/mL    Nil Result      IU/mL    AST      0 - 45 U/L    ALT      0 - 70 U/L        Physical Exam:  Vitals: There were no vitals taken for this visit.  SKIN: Teledermatology photos were  reviewed; image quality and interpretability: acceptable. Image date: 9/13/21.  - Photos of abdomen, left torso, and back reviewed.  No visible plaques.   - No other lesions of concern on areas examined.     Medications:  Current Outpatient Medications   Medication     amLODIPine (NORVASC) 10 MG tablet     benazepril (LOTENSIN) 40 MG tablet     calcipotriene 0.005 % OINT     clobetasol (TEMOVATE) 0.05 % external ointment     clobetasol (TEMOVATE) 0.05 % external solution     Fexofenadine HCl (ALLEGRA PO)     hydrOXYzine (ATARAX) 25 MG tablet     ustekinumab (STELARA) 90 MG/ML     No current facility-administered medications for this visit.      Past Medical/Surgical History:   Patient Active Problem List   Diagnosis     Other psoriasis     Obesity     Essential hypertension with goal blood pressure less than 140/90     Obesity, Class I, BMI 30-34.9     Benign cyst of both testicles     Morbid obesity (H)     Past Medical History:   Diagnosis Date     Essential hypertension, benign 2000     Hypertension      Obesity      Other psoriasis     since childhood       CC No referring provider defined for this encounter. on close of this encounter.    Teledermatology Nurse Call Patients:   Chief Complaint   Patient presents with     Psoriasis     stable     Are you in the [facility] at the time of the encounter? yes    Today's visit will be billed to you and your insurance.    A teledermatology visit is not as thorough as an in-person visit and the quality of the photograph sent may not be of the same quality as that taken by the dermatology clinic.  Roxanne Cuevas, RENARD on 9/16/2021 at 10:54 AM

## 2021-09-17 ENCOUNTER — TELEPHONE (OUTPATIENT)
Dept: DERMATOLOGY | Facility: CLINIC | Age: 51
End: 2021-09-17

## 2021-09-17 NOTE — TELEPHONE ENCOUNTER
PA Initiation    Medication: Otezla  Insurance Company: [employer] ([facility]) - Phone [phone] Fax [phone]  ID#: [id number]  Pharmacy Filling the Rx: [facility] - [place], [place] - [facility] [address]  Filling Pharmacy Phone:    Filling Pharmacy Fax:    Start Date: 9/17/2021    JENNY Key [id number]

## 2021-09-21 ENCOUNTER — LAB (OUTPATIENT)
Dept: LAB | Facility: OTHER | Age: 51
End: 2021-09-21
Payer: COMMERCIAL

## 2021-09-21 ENCOUNTER — MYC MEDICAL ADVICE (OUTPATIENT)
Dept: DERMATOLOGY | Facility: CLINIC | Age: 51
End: 2021-09-21

## 2021-09-21 DIAGNOSIS — I10 ESSENTIAL HYPERTENSION WITH GOAL BLOOD PRESSURE LESS THAN 140/90: ICD-10-CM

## 2021-09-21 DIAGNOSIS — E66.811 OBESITY, CLASS I, BMI 30-34.9: ICD-10-CM

## 2021-09-21 DIAGNOSIS — D72.829 LEUKOCYTOSIS, UNSPECIFIED TYPE: Primary | ICD-10-CM

## 2021-09-21 DIAGNOSIS — Z51.81 MEDICATION MONITORING ENCOUNTER: ICD-10-CM

## 2021-09-21 LAB
ALBUMIN SERPL-MCNC: 3.8 G/DL (ref 3.4–5)
ALP SERPL-CCNC: 93 U/L (ref 40–150)
ALT SERPL W P-5'-P-CCNC: 29 U/L (ref 0–70)
ANION GAP SERPL CALCULATED.3IONS-SCNC: 6 MMOL/L (ref 3–14)
AST SERPL W P-5'-P-CCNC: 16 U/L (ref 0–45)
BILIRUB SERPL-MCNC: 0.3 MG/DL (ref 0.2–1.3)
BUN SERPL-MCNC: 11 MG/DL (ref 7–30)
CALCIUM SERPL-MCNC: 8.6 MG/DL (ref 8.5–10.1)
CHLORIDE BLD-SCNC: 108 MMOL/L (ref 94–109)
CO2 SERPL-SCNC: 26 MMOL/L (ref 20–32)
CREAT SERPL-MCNC: 0.98 MG/DL (ref 0.66–1.25)
GFR SERPL CREATININE-BSD FRML MDRD: 90 ML/MIN/1.73M2
GLUCOSE BLD-MCNC: 96 MG/DL (ref 70–99)
POTASSIUM BLD-SCNC: 3.7 MMOL/L (ref 3.4–5.3)
PROT SERPL-MCNC: 7.6 G/DL (ref 6.8–8.8)
SODIUM SERPL-SCNC: 140 MMOL/L (ref 133–144)

## 2021-09-21 PROCEDURE — 80053 COMPREHEN METABOLIC PANEL: CPT

## 2021-09-21 PROCEDURE — 36415 COLL VENOUS BLD VENIPUNCTURE: CPT

## 2021-09-21 NOTE — TELEPHONE ENCOUNTER
Prior Authorization Approval    Authorization Effective Date: 9/17/2021  Authorization Expiration Date: 9/17/2022  Medication: Otezla  Approved Dose/Quantity:   Reference #: CMM Key [id number]   Insurance Company: [employer] ([facility]) - Phone [phone] Fax [phone]  Expected CoPay:       CoPay Card Available:      Foundation Assistance Needed:    Which Pharmacy is filling the prescription (Not needed for infusion/clinic administered): [facility] ([employer]) [facility] - [address]  Pharmacy Notified: Yes  Patient Notified: Yes

## 2021-09-28 ENCOUNTER — ALLIED HEALTH/NURSE VISIT (OUTPATIENT)
Dept: FAMILY MEDICINE | Facility: OTHER | Age: 51
End: 2021-09-28
Payer: COMMERCIAL

## 2021-09-28 DIAGNOSIS — Z23 NEED FOR PROPHYLACTIC VACCINATION AND INOCULATION AGAINST INFLUENZA: Primary | ICD-10-CM

## 2021-09-28 DIAGNOSIS — Z79.899 ENCOUNTER FOR LONG-TERM (CURRENT) USE OF HIGH-RISK MEDICATION: ICD-10-CM

## 2021-09-28 DIAGNOSIS — L40.9 PSORIASIS: ICD-10-CM

## 2021-09-28 PROCEDURE — 90682 RIV4 VACC RECOMBINANT DNA IM: CPT

## 2021-09-28 PROCEDURE — 90471 IMMUNIZATION ADMIN: CPT

## 2021-09-28 PROCEDURE — 99207 PR NO CHARGE NURSE ONLY: CPT

## 2021-09-28 PROCEDURE — 90472 IMMUNIZATION ADMIN EACH ADD: CPT

## 2021-09-28 PROCEDURE — 90670 PCV13 VACCINE IM: CPT

## 2021-12-17 ENCOUNTER — OFFICE VISIT (OUTPATIENT)
Dept: DERMATOLOGY | Facility: CLINIC | Age: 51
End: 2021-12-17
Payer: COMMERCIAL

## 2021-12-17 VITALS — WEIGHT: 256.8 LBS | BODY MASS INDEX: 32.53 KG/M2

## 2021-12-17 DIAGNOSIS — L73.9 FOLLICULITIS: ICD-10-CM

## 2021-12-17 DIAGNOSIS — L29.9 ITCHING: ICD-10-CM

## 2021-12-17 DIAGNOSIS — Z51.81 MEDICATION MONITORING ENCOUNTER: ICD-10-CM

## 2021-12-17 DIAGNOSIS — L40.9 PSORIASIS: Primary | ICD-10-CM

## 2021-12-17 PROCEDURE — 99214 OFFICE O/P EST MOD 30 MIN: CPT | Performed by: DERMATOLOGY

## 2021-12-17 RX ORDER — HYDROXYZINE HYDROCHLORIDE 25 MG/1
TABLET, FILM COATED ORAL
Qty: 360 TABLET | Refills: 1 | Status: SHIPPED | OUTPATIENT
Start: 2021-12-17 | End: 2024-01-17

## 2021-12-17 RX ORDER — CLOBETASOL PROPIONATE 0.5 MG/G
OINTMENT TOPICAL
Qty: 60 G | Refills: 4 | Status: SHIPPED | OUTPATIENT
Start: 2021-12-17 | End: 2022-06-27

## 2021-12-17 RX ORDER — CLOBETASOL PROPIONATE 0.5 MG/ML
SOLUTION TOPICAL
Qty: 50 ML | Refills: 3 | Status: SHIPPED | OUTPATIENT
Start: 2021-12-17 | End: 2022-06-27

## 2021-12-17 RX ORDER — APREMILAST 30 MG/1
30 TABLET, FILM COATED ORAL 2 TIMES DAILY
Qty: 60 TABLET | Refills: 11 | Status: SHIPPED | OUTPATIENT
Start: 2021-12-17 | End: 2022-06-27

## 2021-12-17 ASSESSMENT — PAIN SCALES - GENERAL: PAINLEVEL: NO PAIN (0)

## 2021-12-17 NOTE — PROGRESS NOTES
To White's goals for this visit include:   Chief Complaint   Patient presents with     Psoriasis     doing okay, has some new patches on his back after switching from biologic. Using clobetosol     He requests these members of his care team be copied on today's visit information: no      PCP: Annel Yusuf    Referring Provider:  No referring provider defined for this encounter.    There were no vitals taken for this visit.    Do you need any medication refills at today's visit? Yes, Parminder Veronica LPN

## 2021-12-17 NOTE — LETTER
"    12/17/2021         RE: To White  [address] [facility] [address]        Dear Colleague,    Thank you for referring your patient, To White, to the [facility]. Please see a copy of my visit note below.    [facility] Dermatology Note  Encounter Date: Dec 17, 2021  Office Visit     Dermatology Problem List:  1. Plaque psoriasis: 1-3% TBSA  -Current Tx: Stelara, triamcinolone 0.1% ointment, desonide 0.05% ointment, calcipotriene 0.005% ointment, Clobetasol  -Past tx: Soriatane 25 mg daily stopped due to pyogenic granulomas 6/2018, Humira,   -Quant gold 9/4/2020  2. Onycholysis of distal right toenails, May be in association with psoriasis.   3. Seborrheic Dermatitis  -Current Tx: clobetasol, ketoconazole 2% shampoo  ____________________________________________     Assessment & Plan:     # Plaque psoriasis - chronic and stable. <1TBSA.  Previously on stalera, has switched to otezla. Holding, due to elevated white blood cell count see heme onc. Scheduled for visit with oncologyin January.  - Continue clobetasol cream BID for up to 2 weeks in a row. Refilled today.  - Continue desonide PRN when active, not active on face or groin. Refilled today.  - Continue hydroxyzine at night. Refilled today. Reviewed this can make drowsy.  - Continue otezla. Patient notes no GI upset, nausea, vomiting, diarrhea, depression, weight loss, headaches, and URI. No changes in mood or suicidal ideation. The patient will contact clinic if they experience any changes in mood and stop medicaiton  - CMP from September 2021 reviewed.  - BUN creatinine lab ordered for future    Vaccination summary  COVID vaccinated-  Has nichole and nichole, then voosted  - Flu vaccinated.  - Shingrix vaccinated  - Pneumonia vaccinated.  - Prevnar 13 vaccinated     # Elevated WBC count - kept for safety.  - \"Seen by oncology 7/29/21. They are monitoring and currently have not found anything " "concerning\"  -reviewed record and noticed T cell issues are possible  - Scheduled in January with rheumatology.     # Joint pain, chronic still active.  - Seen by rheumatology 2/2021; been feeling good recently and controls pain with exercise and OTC pain medications  - Scheduled in January with rheumatology.     # Well healed scar, left abdomen (previously noted as   -recheck at next in person visit in 6 months     # Seborrheic Dermatitis. No change  - Clobetasol 3x weekly to the scalp, no change, can do for a full 1-2 weeks       #folliculitis, posterior thighs  -BP wash, bleaches towels    Procedures Performed:   NA    Follow-up: 6 month(s) in-person, or earlier for new or changing lesions    Staff and Scribe:     Scribe Disclosure:   I, Suzette Skaggs, am serving as a scribe to document services personally performed by this physician, Dr. Jacey Cervantes, based on data collection and the provider's statements to me.    Provider Disclosure:   The documentation recorded by the scribe accurately reflects the services I personally performed and the decisions made by me.    Jacey Cervantes MD    Department of Dermatology  [facility]: Phone: [phone], Fax:[phone]  [facility]: Phone: [phone], Fax: [phone]      ____________________________________________    CC: Psoriasis (doing okay, has some new patches on his back after switching from biologic. Using clobetosol)    HPI:  Mr. To White is a(n) 51 year old male who presents today as a return patient for psoriasis.    Last seen 9/16/21 virtually. Plan was to continue clobetasol BID x 2 weeks in a row (scalp three times per week), dovonex, and desonide. Continued on stelara x 8 weeks then transition to otezla.    Today, To notes the switch to otezla has been well. There are a couple spots on the back. Notes no stomach upset, " nausea, vomiting, no weight loss. No mood changes.    Patient is otherwise feeling well, without additional skin concerns.    Labs Reviewed:  CMP from 9/21 reviewed.        Physical Exam:  Vitals: There were no vitals taken for this visit.  SKIN: Total skin excluding the undergarment areas was performed. The exam included the head/face, neck, both arms, chest, back, abdomen, external buttocks, both legs, digits and/or nails.  - Well healed scar on the left abdomen (groin from last visit)  - 2 red pencil size patches on the back.  - Perifollicular papules and pustules on the posterior thighs.  - No other lesions of concern on areas examined.     Medications:  Current Outpatient Medications   Medication     amLODIPine (NORVASC) 10 MG tablet     Apremilast (OTEZLA) 10 & 20 & 30 MG TBPK     apremilast (OTEZLA) 30 MG tablet     benazepril (LOTENSIN) 40 MG tablet     calcipotriene 0.005 % OINT     clobetasol (TEMOVATE) 0.05 % external ointment     clobetasol (TEMOVATE) 0.05 % external solution     Fexofenadine HCl (ALLEGRA PO)     hydrOXYzine (ATARAX) 25 MG tablet     No current facility-administered medications for this visit.      Past Medical History:   Patient Active Problem List   Diagnosis     Other psoriasis     Obesity     Essential hypertension with goal blood pressure less than 140/90     Obesity, Class I, BMI 30-34.9     Benign cyst of both testicles     Morbid obesity (H)     Past Medical History:   Diagnosis Date     Essential hypertension, benign 2000     Hypertension      Obesity      Other psoriasis     since childhood        CC No referring provider defined for this encounter. on close of this encounter.    To White's goals for this visit include:   Chief Complaint   Patient presents with     Psoriasis     doing okay, has some new patches on his back after switching from biologic. Using clobetosol     He requests these members of his care team be copied on today's visit information: no      PCP:  Annel Yusuf    Referring Provider:  No referring provider defined for this encounter.    There were no vitals taken for this visit.    Do you need any medication refills at today's visit? Yes, Parminder Veronica LPN          Again, thank you for allowing me to participate in the care of your patient.        Sincerely,        Jacey Cervantes MD

## 2021-12-17 NOTE — PATIENT INSTRUCTIONS
[facility] Dermatology Visit    Thank you for allowing us to participate in your care. Your findings, instructions and follow-up plan are as follows:         When should I call my doctor?    If you are worsening or not improving, please, contact us or seek urgent care as noted below.     Who should I call with questions (adults)?    [facility] (adult and pediatric): [phone]    [facility] (adult): [phone]    For urgent needs outside of business hours call the [facility] [[email or web address]] at [phone] and ask for the dermatology resident on call    If this is a medical emergency and you are unable to reach an ER, Call 911    Who should I call with questions (pediatric)?  [facility]- Pediatric Dermatology  Dr. Torri Zimmerman, Dr. Geoff Sifuentes, Dr. Shruthi Ghotra, Ivelisse Hamilton, PA  Dr. Divine Florence, Dr. Aimee Gunn & Dr. Jeremias Gaston  Non Urgent  Nurse Triage Line; [phone]- Kasie and Purvi ORTIZ Care Coordinators   Krysta (/Complex ) [phone]    If you need a prescription refill, please contact your pharmacy. Refills are approved or denied by our physicians during normal business hours, Monday through Fridays  Per office policy, refills will not be granted if you have not been seen within the past year (or sooner depending on your child's condition).    Scheduling Information:  Pediatric Appointment Scheduling and Call Center [phone]  Radiology Scheduling- [phone]  Sedation Unit Scheduling- [phone]  [place] Scheduling- General [phone]; Pediatric Dermatology [phone]  Main  Services: [phone]  Hungarian: [phone]  Burkinan: [phone]  Hmong/Armenian/Faroese: [phone]  Preadmission Nursing Department Fax Number: [phone] (fax all pre-operative paperwork to this number)    For urgent matters arising during evenings,  weekends, or holidays that cannot wait for normal business hours please call [phone] and ask for the dermatology resident on call to be paged.

## 2021-12-17 NOTE — PROGRESS NOTES
"[facility] Dermatology Note  Encounter Date: Dec 17, 2021  Office Visit     Dermatology Problem List:  1. Plaque psoriasis: 1-3% TBSA  -Current Tx: Stelara, triamcinolone 0.1% ointment, desonide 0.05% ointment, calcipotriene 0.005% ointment, Clobetasol  -Past tx: Soriatane 25 mg daily stopped due to pyogenic granulomas 6/2018, Humira,   -Quant gold 9/4/2020  2. Onycholysis of distal right toenails, May be in association with psoriasis.   3. Seborrheic Dermatitis  -Current Tx: clobetasol, ketoconazole 2% shampoo  ____________________________________________     Assessment & Plan:     # Plaque psoriasis - chronic and stable. <1TBSA.  Previously on stalera, has switched to otezla. Holding, due to elevated white blood cell count see heme onc. Scheduled for visit with oncologyin January.  - Continue clobetasol cream BID for up to 2 weeks in a row. Refilled today.  - Continue desonide PRN when active, not active on face or groin. Refilled today.  - Continue hydroxyzine at night. Refilled today. Reviewed this can make drowsy.  - Continue otezla. Patient notes no GI upset, nausea, vomiting, diarrhea, depression, weight loss, headaches, and URI. No changes in mood or suicidal ideation. The patient will contact clinic if they experience any changes in mood and stop medicaiton  - CMP from September 2021 reviewed.  - BUN creatinine lab ordered for future    Vaccination summary  COVID vaccinated-  Has nichole and nichole, then voosted  - Flu vaccinated.  - Shingrix vaccinated  - Pneumonia vaccinated.  - Prevnar 13 vaccinated     # Elevated WBC count - kept for safety.  - \"Seen by oncology 7/29/21. They are monitoring and currently have not found anything concerning\"  -reviewed record and noticed T cell issues are possible  - Scheduled in January with rheumatology.     # Joint pain, chronic still active.  - Seen by rheumatology 2/2021; been feeling good recently and controls pain with exercise and OTC pain " medications  - Scheduled in January with rheumatology.     # Well healed scar, left abdomen (previously noted as   -recheck at next in person visit in 6 months     # Seborrheic Dermatitis. No change  - Clobetasol 3x weekly to the scalp, no change, can do for a full 1-2 weeks       #folliculitis, posterior thighs  -BP wash, bleaches towels    Procedures Performed:   NA    Follow-up: 6 month(s) in-person, or earlier for new or changing lesions    Staff and Scribe:     Scribe Disclosure:   I, Suzette Skaggs, am serving as a scribe to document services personally performed by this physician, Dr. Jacey Cervantes, based on data collection and the provider's statements to me.    Provider Disclosure:   The documentation recorded by the scribe accurately reflects the services I personally performed and the decisions made by me.    Jacey Cervantes MD    Department of Dermatology  [facility]: Phone: [phone], Fax:[phone]  [facility]: Phone: [phone], Fax: [phone]      ____________________________________________    CC: Psoriasis (doing okay, has some new patches on his back after switching from biologic. Using clobetosol)    HPI:  Mr. To White is a(n) 51 year old male who presents today as a return patient for psoriasis.    Last seen 9/16/21 virtually. Plan was to continue clobetasol BID x 2 weeks in a row (scalp three times per week), dovonex, and desonide. Continued on stelara x 8 weeks then transition to otezla.    Today, To notes the switch to otezla has been well. There are a couple spots on the back. Notes no stomach upset, nausea, vomiting, no weight loss. No mood changes.    Patient is otherwise feeling well, without additional skin concerns.    Labs Reviewed:  CMP from 9/21 reviewed.        Physical Exam:  Vitals: There were no vitals taken for this visit.  SKIN: Total skin  excluding the undergarment areas was performed. The exam included the head/face, neck, both arms, chest, back, abdomen, external buttocks, both legs, digits and/or nails.  - Well healed scar on the left abdomen (groin from last visit)  - 2 red pencil size patches on the back.  - Perifollicular papules and pustules on the posterior thighs.  - No other lesions of concern on areas examined.     Medications:  Current Outpatient Medications   Medication     amLODIPine (NORVASC) 10 MG tablet     Apremilast (OTEZLA) 10 & 20 & 30 MG TBPK     apremilast (OTEZLA) 30 MG tablet     benazepril (LOTENSIN) 40 MG tablet     calcipotriene 0.005 % OINT     clobetasol (TEMOVATE) 0.05 % external ointment     clobetasol (TEMOVATE) 0.05 % external solution     Fexofenadine HCl (ALLEGRA PO)     hydrOXYzine (ATARAX) 25 MG tablet     No current facility-administered medications for this visit.      Past Medical History:   Patient Active Problem List   Diagnosis     Other psoriasis     Obesity     Essential hypertension with goal blood pressure less than 140/90     Obesity, Class I, BMI 30-34.9     Benign cyst of both testicles     Morbid obesity (H)     Past Medical History:   Diagnosis Date     Essential hypertension, benign 2000     Hypertension      Obesity      Other psoriasis     since childhood        CC No referring provider defined for this encounter. on close of this encounter.

## 2022-01-11 ENCOUNTER — LAB (OUTPATIENT)
Dept: LAB | Facility: CLINIC | Age: 52
End: 2022-01-11
Payer: COMMERCIAL

## 2022-01-11 DIAGNOSIS — Z51.81 MEDICATION MONITORING ENCOUNTER: ICD-10-CM

## 2022-01-11 DIAGNOSIS — I10 ESSENTIAL HYPERTENSION WITH GOAL BLOOD PRESSURE LESS THAN 140/90: ICD-10-CM

## 2022-01-11 DIAGNOSIS — D72.829 LEUKOCYTOSIS, UNSPECIFIED TYPE: ICD-10-CM

## 2022-01-11 DIAGNOSIS — E66.811 OBESITY, CLASS I, BMI 30-34.9: ICD-10-CM

## 2022-01-11 LAB
BASOPHILS # BLD AUTO: 0.1 10E3/UL (ref 0–0.2)
BASOPHILS # BLD AUTO: 0.1 10E3/UL (ref 0–0.2)
BASOPHILS NFR BLD AUTO: 1 %
BASOPHILS NFR BLD AUTO: 1 %
BUN SERPL-MCNC: 9 MG/DL (ref 7–30)
CREAT SERPL-MCNC: 0.96 MG/DL (ref 0.66–1.25)
EOSINOPHIL # BLD AUTO: 0.7 10E3/UL (ref 0–0.7)
EOSINOPHIL # BLD AUTO: 0.7 10E3/UL (ref 0–0.7)
EOSINOPHIL NFR BLD AUTO: 6 %
EOSINOPHIL NFR BLD AUTO: 6 %
ERYTHROCYTE [DISTWIDTH] IN BLOOD BY AUTOMATED COUNT: 12.8 % (ref 10–15)
ERYTHROCYTE [DISTWIDTH] IN BLOOD BY AUTOMATED COUNT: 13 % (ref 10–15)
GFR SERPL CREATININE-BSD FRML MDRD: >90 ML/MIN/1.73M2
HCT VFR BLD AUTO: 42.7 % (ref 40–53)
HCT VFR BLD AUTO: 42.9 % (ref 40–53)
HGB BLD-MCNC: 14 G/DL (ref 13.3–17.7)
HGB BLD-MCNC: 14.3 G/DL (ref 13.3–17.7)
IMM GRANULOCYTES # BLD: 0.1 10E3/UL
IMM GRANULOCYTES # BLD: 0.1 10E3/UL
IMM GRANULOCYTES NFR BLD: 1 %
IMM GRANULOCYTES NFR BLD: 1 %
LAB DIRECTOR COMMENTS: NORMAL
LAB DIRECTOR DISCLAIMER: NORMAL
LAB DIRECTOR INTERPRETATION: NORMAL
LAB DIRECTOR METHODOLOGY: NORMAL
LAB DIRECTOR RESULTS: NORMAL
LYMPHOCYTES # BLD AUTO: 4.4 10E3/UL (ref 0.8–5.3)
LYMPHOCYTES # BLD AUTO: 4.4 10E3/UL (ref 0.8–5.3)
LYMPHOCYTES NFR BLD AUTO: 42 %
LYMPHOCYTES NFR BLD AUTO: 43 %
MCH RBC QN AUTO: 30 PG (ref 26.5–33)
MCH RBC QN AUTO: 30.5 PG (ref 26.5–33)
MCHC RBC AUTO-ENTMCNC: 32.8 G/DL (ref 31.5–36.5)
MCHC RBC AUTO-ENTMCNC: 33.3 G/DL (ref 31.5–36.5)
MCV RBC AUTO: 91 FL (ref 78–100)
MCV RBC AUTO: 92 FL (ref 78–100)
MONOCYTES # BLD AUTO: 1 10E3/UL (ref 0–1.3)
MONOCYTES # BLD AUTO: 1.1 10E3/UL (ref 0–1.3)
MONOCYTES NFR BLD AUTO: 10 %
MONOCYTES NFR BLD AUTO: 10 %
NEUTROPHILS # BLD AUTO: 4 10E3/UL (ref 1.6–8.3)
NEUTROPHILS # BLD AUTO: 4.2 10E3/UL (ref 1.6–8.3)
NEUTROPHILS NFR BLD AUTO: 39 %
NEUTROPHILS NFR BLD AUTO: 40 %
NRBC # BLD AUTO: 0 10E3/UL
NRBC # BLD AUTO: 0 10E3/UL
NRBC BLD AUTO-RTO: 0 /100
NRBC BLD AUTO-RTO: 0 /100
PLATELET # BLD AUTO: 367 10E3/UL (ref 150–450)
PLATELET # BLD AUTO: 375 10E3/UL (ref 150–450)
RBC # BLD AUTO: 4.67 10E6/UL (ref 4.4–5.9)
RBC # BLD AUTO: 4.69 10E6/UL (ref 4.4–5.9)
RETICS # AUTO: 0.06 10E6/UL (ref 0.03–0.1)
RETICS/RBC NFR AUTO: 1.3 % (ref 0.5–2)
SPECIMEN DESCRIPTION: NORMAL
WBC # BLD AUTO: 10.2 10E3/UL (ref 4–11)
WBC # BLD AUTO: 10.5 10E3/UL (ref 4–11)

## 2022-01-11 PROCEDURE — 82565 ASSAY OF CREATININE: CPT

## 2022-01-11 PROCEDURE — 85060 BLOOD SMEAR INTERPRETATION: CPT | Performed by: PATHOLOGY

## 2022-01-11 PROCEDURE — 36415 COLL VENOUS BLD VENIPUNCTURE: CPT | Performed by: INTERNAL MEDICINE

## 2022-01-11 PROCEDURE — 85025 COMPLETE CBC W/AUTO DIFF WBC: CPT | Performed by: INTERNAL MEDICINE

## 2022-01-11 PROCEDURE — 81342 TRG GENE REARRANGEMENT ANAL: CPT | Performed by: INTERNAL MEDICINE

## 2022-01-11 PROCEDURE — 85045 AUTOMATED RETICULOCYTE COUNT: CPT | Performed by: INTERNAL MEDICINE

## 2022-01-11 PROCEDURE — 84520 ASSAY OF UREA NITROGEN: CPT

## 2022-01-11 PROCEDURE — G0452 MOLECULAR PATHOLOGY INTERPR: HCPCS | Mod: 26 | Performed by: STUDENT IN AN ORGANIZED HEALTH CARE EDUCATION/TRAINING PROGRAM

## 2022-01-12 LAB
PATH REPORT.COMMENTS IMP SPEC: NORMAL
PATH REPORT.FINAL DX SPEC: NORMAL
PATH REPORT.MICROSCOPIC SPEC OTHER STN: NORMAL
PATH REPORT.MICROSCOPIC SPEC OTHER STN: NORMAL

## 2022-01-27 ENCOUNTER — VIRTUAL VISIT (OUTPATIENT)
Dept: ONCOLOGY | Facility: CLINIC | Age: 52
End: 2022-01-27
Attending: INTERNAL MEDICINE
Payer: COMMERCIAL

## 2022-01-27 DIAGNOSIS — D72.829 LEUKOCYTOSIS, UNSPECIFIED TYPE: Primary | ICD-10-CM

## 2022-01-27 PROCEDURE — 99213 OFFICE O/P EST LOW 20 MIN: CPT | Mod: 95 | Performed by: INTERNAL MEDICINE

## 2022-01-27 NOTE — LETTER
"    1/27/2022         RE: To White  [address] [place] [id number]        Dear Colleague,    Thank you for referring your patient, To White, to the [facility]. Please see a copy of my visit note below.    This patient  is being evaluated via a billable video visit.      The patient has been notified of following:     \"This video visit will be conducted via a call between you and your physician/provider. We have found that certain health care needs can be provided without the need for an in-person physical exam.  This service lets us provide the care you need with a video conversation.  If a prescription is necessary we can send it directly to your pharmacy.  If lab work is needed we can place an order for that and you can then stop by our lab to have the test done at a later time.    Video visits are billed at different rates depending on your insurance coverage.  Please reach out to your insurance provider with any questions.    If during the course of the call the physician/provider feels a video visit is not appropriate, you will not be charged for this service.\"    Patient has given verbal consent for Video visit? yes  Video-Visit Details    Type of service:  Video Visit    Video visit duration:5  min  Originating Location (pt. Location): home    Distant Location (provider location):  [facility]     Platform used for Video Visit: Katelyn Arriaza        Hematology follow-up visit:  Date on this visit: Jan 27, 2022    Diagnosis: Leukocytosis.      Primary care physician: Annel Yusuf     History Of Present Illness:  Mr. White is a 51 year old male,  with history of psoriasis on Stelara, who presents for follow-up of mild leukocytosis.  He had normal WBC in September 2019 and in February 2020.  He was first noted to have a mildly elevated white blood cell count in September 2021 his white blood cell count was " 12.2.  The total white blood cell count has remained mildly elevated since, although most recently was normal as below:  Results for ANNE CASTRO (MRN [id number]) as of 2/14/2022 18:42   Ref. Range 2/24/2021 15:36 7/21/2021 08:12 1/11/2022 16:52   WBC Latest Ref Range: 4.0 - 11.0 10e3/uL 13.0 (H) 11.5 (H) 10.2       Absolute neutrophil count has remained normal.  ALC is normal in the high normal range.   Additional work-up of leukocytosis in February 2021 demonstrated:  Creatinine, LFTs within normal limits.  BCR/ABL major undetectable.  JAK2, MPL,CALR  mutation analysis negative, TSH normal. ALC high normal at 4.8.  Previously, ALC high normal at 5 in 2020 and 5.3 in January 2021.  Peripheral blood smear showed slight leukocytosis with borderline eosinophilia.  Lymphocytes appeared polymorphous and neutrophils were unremarkable in morphology. Flow cytometry from July 26, 2021.  It showed polytypic B cells and no aberrant immunophenotype on T cells.  There were rare to absent blasts.  There was no immunophenotypic evidence of non-Hodgkin lymphoma, lymphoid leukemia or acute leukemia. There was a population of CD3+ CD8+ CD57+ T cells which could represent large granular lymphocytes. This population represented a slightly larger proportion of leukocytes than would be usually seen in the peripheral blood but at the a normal immunophenotype and they are polytypic in TCR expression based on TCRcB1 staining, supporting reactive nature. These immunophenotypic findings may represent reactive expansion of a normal T-cell subset. TCR gamma gene rearrangement was negative on peripheral blood.    He is on Stelara for plaque psoriasis.  He reports being on Stelara for the last couple of years.  He follows with Dr. Cervantes.  I have reviewed her note from 12/17/2021 visit. He has been switched to Otezla for plaque psoriasis.    He has had no constitutional symptoms such as fevers chills or night sweats.    Past Medical/Surgical  History:  Past Medical History:   Diagnosis Date     Essential hypertension, benign 2000     Hypertension      Obesity      Other psoriasis     since childhood     Past Surgical History:   Procedure Laterality Date     CHOLECYSTECTOMY       LAPAROSCOPIC CHOLECYSTECTOMY  7/3/2012    Procedure: LAPAROSCOPIC CHOLECYSTECTOMY;  Laparoscopic Cholecystectomy;  Surgeon: Kulwinder Hollins MD;  Location:  OR     NO HISTORY OF SURGERY       Allergies:  Allergies as of 01/27/2022 - Reviewed 01/27/2022   Allergen Reaction Noted     No known allergies  12/19/2002     Current Medications:  Current Outpatient Medications   Medication     amLODIPine (NORVASC) 10 MG tablet     Apremilast (OTEZLA) 10 & 20 & 30 MG TBPK     apremilast (OTEZLA) 30 MG tablet     benazepril (LOTENSIN) 40 MG tablet     calcipotriene 0.005 % OINT     clobetasol (TEMOVATE) 0.05 % external ointment     clobetasol (TEMOVATE) 0.05 % external solution     Fexofenadine HCl (ALLEGRA PO)     hydrOXYzine (ATARAX) 25 MG tablet     No current facility-administered medications for this visit.         Physical Exam:  There were no vitals taken for this visit.    Wt Readings from Last 5 Encounters:   12/17/21 116.5 kg (256 lb 12.8 oz)   07/29/21 117.9 kg (260 lb)   07/15/21 117.9 kg (260 lb)   02/24/21 127 kg (279 lb 14.4 oz)   02/17/20 119.1 kg (262 lb 9.6 oz)       Constitutional: alert and in no distress  Eyes: No redness or discharge  Respiratory: No cough or labored breathing.  Musculoskeletal: Full range of motion in extremities.  Skin: no visible skin lesions or discoloration  Neurological: No tremors and denies headache.  Psychiatric: Mentation appears normal and affect is normal as well.  Alert and oriented x3.  The rest the comprehensive physical examination is deferred due to public health emergency video visit restrictions.    Labs:  Labs from 1/11/2022 reviewed  WBC 10.5, absolute differential counts within normal limits  Hb 14.0 gdl,  Plt count normal at  367k/ul    peripheral blood smear:  - Peripheral blood without diagnostic morphologic abnormality.  - Hemoglobin quantitatively within normal limits.  - WBC subsets quantitatively within normal limits.  - Platelet count quantitatively within normal limits.  TCR rearrangement negative.    ASSESSMENT/PLAN:  To is a very pleasant 51-year-old gentleman with history of plaque psoriasis, on Stelara, with mild stable leukocytosis since September 2020, with normal absolute differential counts, with the exception of 0.1 absolute immature granulocytes noted on the differential in July 2021.  He has had negative NGS on peripheral blood for mutations associated with chronic myeloproliferative disorders.  Flow cytometry showed normal immunophenotype with a subpopulation of cells which could could represent large granular lymphocytes. These immunophenotypic findings may represent reactive expansion of a normal T-cell subset, perhaps associated with his plaque psoriasis.  Nevertheless, I would recommend continued follow-up on his CBC with differential and ensure he does not develop worsening leukocytosis or any other abnormalities.  If that is the case, he will likely need a bone marrow biopsy.      1.  Leukocytosis with normal absolute differential counts. No e/o T cell clonality by flow cytometry or T cell molecular diagnostic studies on peripheral blood. No abnormal morphologic findings on peripheral blood smear.   Recommend CBCd annually going forward. He could do that with Dr. Cervantes or his PCP. F/up on as needed basis.    2.  Plaque psoriasis-continue on Otezla and current topical regimen.  Follow-up with Dr. Cervantes.  Chart documentation completed in part with Dragon voice-recognition software.  Even though reviewed some grammatical, spelling, and word errors may remain.              Again, thank you for allowing me to participate in the care of your patient.        Sincerely,        Kelsey Martinez MD, MD

## 2022-01-27 NOTE — PROGRESS NOTES
"This patient  is being evaluated via a billable video visit.      The patient has been notified of following:     \"This video visit will be conducted via a call between you and your physician/provider. We have found that certain health care needs can be provided without the need for an in-person physical exam.  This service lets us provide the care you need with a video conversation.  If a prescription is necessary we can send it directly to your pharmacy.  If lab work is needed we can place an order for that and you can then stop by our lab to have the test done at a later time.    Video visits are billed at different rates depending on your insurance coverage.  Please reach out to your insurance provider with any questions.    If during the course of the call the physician/provider feels a video visit is not appropriate, you will not be charged for this service.\"    Patient has given verbal consent for Video visit? yes  Video-Visit Details    Type of service:  Video Visit    Video visit duration:5  min  Originating Location (pt. Location): home    Distant Location (provider location):  [facility]     Platform used for Video Visit: Katelyn Arriaza        Hematology follow-up visit:  Date on this visit: Jan 27, 2022    Diagnosis: Leukocytosis.      Primary care physician: Annel Yusuf     History Of Present Illness:  Mr. White is a 51 year old male,  with history of psoriasis on Stelara, who presents for follow-up of mild leukocytosis.  He had normal WBC in September 2019 and in February 2020.  He was first noted to have a mildly elevated white blood cell count in September 2021 his white blood cell count was 12.2.  The total white blood cell count has remained mildly elevated since, although most recently was normal as below:  Results for ANNE WHITE (MRN [id number]) as of 2/14/2022 18:42   Ref. Range 2/24/2021 15:36 7/21/2021 08:12 1/11/2022 16:52   WBC Latest " Ref Range: 4.0 - 11.0 10e3/uL 13.0 (H) 11.5 (H) 10.2       Absolute neutrophil count has remained normal.  ALC is normal in the high normal range.   Additional work-up of leukocytosis in February 2021 demonstrated:  Creatinine, LFTs within normal limits.  BCR/ABL major undetectable.  JAK2, MPL,CALR  mutation analysis negative, TSH normal. ALC high normal at 4.8.  Previously, ALC high normal at 5 in 2020 and 5.3 in January 2021.  Peripheral blood smear showed slight leukocytosis with borderline eosinophilia.  Lymphocytes appeared polymorphous and neutrophils were unremarkable in morphology. Flow cytometry from July 26, 2021.  It showed polytypic B cells and no aberrant immunophenotype on T cells.  There were rare to absent blasts.  There was no immunophenotypic evidence of non-Hodgkin lymphoma, lymphoid leukemia or acute leukemia. There was a population of CD3+ CD8+ CD57+ T cells which could represent large granular lymphocytes. This population represented a slightly larger proportion of leukocytes than would be usually seen in the peripheral blood but at the a normal immunophenotype and they are polytypic in TCR expression based on TCRcB1 staining, supporting reactive nature. These immunophenotypic findings may represent reactive expansion of a normal T-cell subset. TCR gamma gene rearrangement was negative on peripheral blood.    He is on Stelara for plaque psoriasis.  He reports being on Stelara for the last couple of years.  He follows with Dr. Cervantes.  I have reviewed her note from 12/17/2021 visit. He has been switched to Otezla for plaque psoriasis.    He has had no constitutional symptoms such as fevers chills or night sweats.    Past Medical/Surgical History:  Past Medical History:   Diagnosis Date     Essential hypertension, benign 2000     Hypertension      Obesity      Other psoriasis     since childhood     Past Surgical History:   Procedure Laterality Date     CHOLECYSTECTOMY       LAPAROSCOPIC  CHOLECYSTECTOMY  7/3/2012    Procedure: LAPAROSCOPIC CHOLECYSTECTOMY;  Laparoscopic Cholecystectomy;  Surgeon: Kulwinder Hollins MD;  Location: [facility] OR     NO HISTORY OF SURGERY       Allergies:  Allergies as of 01/27/2022 - Reviewed 01/27/2022   Allergen Reaction Noted     No known allergies  12/19/2002     Current Medications:  Current Outpatient Medications   Medication     amLODIPine (NORVASC) 10 MG tablet     Apremilast (OTEZLA) 10 & 20 & 30 MG TBPK     apremilast (OTEZLA) 30 MG tablet     benazepril (LOTENSIN) 40 MG tablet     calcipotriene 0.005 % OINT     clobetasol (TEMOVATE) 0.05 % external ointment     clobetasol (TEMOVATE) 0.05 % external solution     Fexofenadine HCl (ALLEGRA PO)     hydrOXYzine (ATARAX) 25 MG tablet     No current facility-administered medications for this visit.         Physical Exam:  There were no vitals taken for this visit.    Wt Readings from Last 5 Encounters:   12/17/21 116.5 kg (256 lb 12.8 oz)   07/29/21 117.9 kg (260 lb)   07/15/21 117.9 kg (260 lb)   02/24/21 127 kg (279 lb 14.4 oz)   02/17/20 119.1 kg (262 lb 9.6 oz)       Constitutional: alert and in no distress  Eyes: No redness or discharge  Respiratory: No cough or labored breathing.  Musculoskeletal: Full range of motion in extremities.  Skin: no visible skin lesions or discoloration  Neurological: No tremors and denies headache.  Psychiatric: Mentation appears normal and affect is normal as well.  Alert and oriented x3.  The rest the comprehensive physical examination is deferred due to public health emergency video visit restrictions.    Labs:  Labs from 1/11/2022 reviewed  WBC 10.5, absolute differential counts within normal limits  Hb 14.0 gdl,  Plt count normal at 367k/ul    peripheral blood smear:  - Peripheral blood without diagnostic morphologic abnormality.  - Hemoglobin quantitatively within normal limits.  - WBC subsets quantitatively within normal limits.  - Platelet count quantitatively within normal  limits.  TCR rearrangement negative.    ASSESSMENT/PLAN:  To is a very pleasant 51-year-old gentleman with history of plaque psoriasis, on Stelara, with mild stable leukocytosis since September 2020, with normal absolute differential counts, with the exception of 0.1 absolute immature granulocytes noted on the differential in July 2021.  He has had negative NGS on peripheral blood for mutations associated with chronic myeloproliferative disorders.  Flow cytometry showed normal immunophenotype with a subpopulation of cells which could could represent large granular lymphocytes. These immunophenotypic findings may represent reactive expansion of a normal T-cell subset, perhaps associated with his plaque psoriasis.  Nevertheless, I would recommend continued follow-up on his CBC with differential and ensure he does not develop worsening leukocytosis or any other abnormalities.  If that is the case, he will likely need a bone marrow biopsy.      1.  Leukocytosis with normal absolute differential counts. No e/o T cell clonality by flow cytometry or T cell molecular diagnostic studies on peripheral blood. No abnormal morphologic findings on peripheral blood smear.   Recommend CBCd annually going forward. He could do that with Dr. Cervantes or his PCP. F/up on as needed basis.    2.  Plaque psoriasis-continue on Otezla and current topical regimen.  Follow-up with Dr. Cervantes.  Chart documentation completed in part with Dragon voice-recognition software.  Even though reviewed some grammatical, spelling, and word errors may remain.

## 2022-04-16 ENCOUNTER — HEALTH MAINTENANCE LETTER (OUTPATIENT)
Age: 52
End: 2022-04-16

## 2022-06-27 ENCOUNTER — VIRTUAL VISIT (OUTPATIENT)
Dept: DERMATOLOGY | Facility: CLINIC | Age: 52
End: 2022-06-27
Payer: COMMERCIAL

## 2022-06-27 DIAGNOSIS — L40.9 PSORIASIS: ICD-10-CM

## 2022-06-27 DIAGNOSIS — L40.0 PLAQUE PSORIASIS: ICD-10-CM

## 2022-06-27 DIAGNOSIS — Z51.81 MEDICATION MONITORING ENCOUNTER: Primary | ICD-10-CM

## 2022-06-27 PROCEDURE — 99214 OFFICE O/P EST MOD 30 MIN: CPT | Mod: 95 | Performed by: DERMATOLOGY

## 2022-06-27 RX ORDER — CLOBETASOL PROPIONATE 0.5 MG/G
OINTMENT TOPICAL
Qty: 60 G | Refills: 4 | Status: SHIPPED | OUTPATIENT
Start: 2022-06-27 | End: 2023-06-30

## 2022-06-27 RX ORDER — DESONIDE 0.5 MG/G
OINTMENT TOPICAL
Qty: 60 G | Refills: 6 | Status: SHIPPED | OUTPATIENT
Start: 2022-06-27 | End: 2023-06-30

## 2022-06-27 RX ORDER — APREMILAST 30 MG/1
30 TABLET, FILM COATED ORAL 2 TIMES DAILY
Qty: 60 TABLET | Refills: 11 | Status: ON HOLD | OUTPATIENT
Start: 2022-06-27 | End: 2023-02-17

## 2022-06-27 RX ORDER — CLOBETASOL PROPIONATE 0.5 MG/ML
SOLUTION TOPICAL
Qty: 50 ML | Refills: 3 | Status: SHIPPED | OUTPATIENT
Start: 2022-06-27 | End: 2023-06-30

## 2022-06-27 NOTE — PATIENT INSTRUCTIONS
[facility] Dermatology Visit    Thank you for allowing us to participate in your care. Your findings, instructions and follow-up plan are as follows:           When should I call my doctor?  If you are worsening or not improving, please, contact us or seek urgent care as noted below.     Who should I call with questions (adults)?  [facility] (adult and pediatric): [phone]  [facility] (adult): [phone]  For urgent needs outside of business hours call the [facility] at [phone] and ask for the dermatology resident on call  If this is a medical emergency and you are unable to reach an ER, Call 911    Who should I call with questions (pediatric)?  [facility]- Pediatric Dermatology  Dr. Torri Zimmerman, Dr. Geoff Sifuentes, Dr. Shruthi Ghotra, Ivelisse Hamilton, PA  Dr. Divine Florence, Dr. Aimee Gunn & Dr. Jeremias Gaston  Non Urgent  Nurse Triage Line; [phone]- Kasie and Purvi RN Care Coordinators   Krysta (/Complex ) [phone]    If you need a prescription refill, please contact your pharmacy. Refills are approved or denied by our physicians during normal business hours, Monday through Fridays  Per office policy, refills will not be granted if you have not been seen within the past year (or sooner depending on your child's condition).    Scheduling Information:  Pediatric Appointment Scheduling and Call Center [phone]  Radiology Scheduling- [phone]  Sedation Unit Scheduling- [phone]  [place] Scheduling- General [phone]; Pediatric Dermatology [phone]  Main  Services: [phone]  Filipino: [phone]  Rwandan: [phone]  Hmong/Indonesian/Syrian: [phone]  Preadmission Nursing Department Fax Number: [phone] (fax all pre-operative paperwork to this number)    For urgent matters arising during evenings, weekends, or  holidays that cannot wait for normal business hours please call [phone] and ask for the dermatology resident on call to be paged.

## 2022-06-27 NOTE — LETTER
"    6/27/2022         RE: To White  [address] [facility] [address]        Dear Colleague,    Thank you for referring your patient, To White, to the [facility]. Please see a copy of my visit note below.    [facility] Dermatology Note  Encounter Date: Jun 27, 2022  Store-and-Forward and Telephone ([phone] ). Location of teledermatologist: [facility].  Start time: 12:43pm. End time: 12:56pm.    Dermatology Problem List:  1. Plaque psoriasis: 1-3% TBSA  -Current Tx: Stelara, triamcinolone 0.1% ointment, desonide 0.05% ointment, calcipotriene 0.005% ointment, Clobetasol  -Past tx: Soriatane 25 mg daily stopped due to pyogenic granulomas 6/2018, Humira,   -Quant gold 9/4/2020  2. Onycholysis of distal right toenails, May be in association with psoriasis.   3. Seborrheic Dermatitis  -Current Tx: clobetasol, ketoconazole 2% shampoo  ____________________________________________     Assessment & Plan:      # Plaque psoriasis - chronic and stable. <1TBSA.  Previously on stalera, has switched to otezla. Holding, due to elevated white blood cell count see heme onc. Scheduled for visit with oncologyin January.  - Continue clobetasol cream BID for up to 2 weeks in a row fr body, solution is fore scalp  - Continue desonide-face and groin  - Continue hydroxyzine at night. Refilled today. Reviewed this can make drows, might increase risk dementia- he is not taking every day  - Continue otezla. Stop drug for suicidal thoughts(mood is good)  - last BUN creatinine reviewed  And will repeat in July      # Elevated WBC count - kept for safety.  - \"Seen by oncology 7/29/21. They are monitoring and currently have not found anything concerning\"  -reviewed record and noticed T cell issues are possible  - pending heme onc visit     # Joint pain, chronic still active.  - Seen by rheumatology    # Well healed scar, left abdomen (previously noted)  -pt " "reports the dark area at the scar is resolved  -offered derm visit within 4 months and declines, will have primary check     # Seborrheic Dermatitis. No change  - Clobetasol 3x weekly to the scalp, no change, can do for a full 1-2 weeks       Procedures Performed:    None    Follow-up: 1 year in person with derm, see PCP for scar to make sure not issues  ____________________________________________    CC: Psoriasis    HPI:  Mr. To White is a(n) 52 year old male who presents today as a return patient for psoriasis. PT was switched over to Otezla. He does have more spots then with his old drug. \"nothing unbearable.\"    Denies diarrhea, weight loss, mood problems, and cold like symptoms and headache.     Reports some in hair line denies genitals      Has not seen hematology oncology since our last viist   Patient is otherwise feeling well, without additional skin concerns.    Labs Reviewed:  Component      Latest Ref Rng & Units 1/11/2022   Creatinine      0.66 - 1.25 mg/dL 0.96   GFR Estimate      >60 mL/min/1.73m2 >90   Urea Nitrogen      7 - 30 mg/dL 9       Physical Exam:  Vitals: There were no vitals taken for this visit.  SKIN: Teledermatology photos were reviewed; image quality and interpretability:  acceptable. Image date: within last 5 days  - erythematous patche son the trunk and extremities  --reviewed with patient that pigmented lesions are not assessable via photography and would need in person visit    - No other lesions of concern on areas examined.     Medications:  Current Outpatient Medications   Medication     amLODIPine (NORVASC) 10 MG tablet     apremilast (OTEZLA) 30 MG tablet     benazepril (LOTENSIN) 40 MG tablet     calcipotriene 0.005 % OINT     clobetasol (TEMOVATE) 0.05 % external ointment     clobetasol (TEMOVATE) 0.05 % external solution     Fexofenadine HCl (ALLEGRA PO)     hydrOXYzine (ATARAX) 25 MG tablet     Apremilast (OTEZLA) 10 & 20 & 30 MG TBPK     No current " facility-administered medications for this visit.      Past Medical/Surgical History:   Patient Active Problem List   Diagnosis     Other psoriasis     Obesity     Essential hypertension with goal blood pressure less than 140/90     Obesity, Class I, BMI 30-34.9     Benign cyst of both testicles     Morbid obesity (H)     Past Medical History:   Diagnosis Date     Essential hypertension, benign 2000     Hypertension      Obesity      Other psoriasis     since childhood       CC No referring provider defined for this encounter. on close of this encounter.      Again, thank you for allowing me to participate in the care of your patient.        Sincerely,        Jacey Cervantes MD

## 2022-06-27 NOTE — NURSING NOTE
Chief Complaint   Patient presents with     Psoriasis     Teledermatology Nurse Call Patients:     Are you in the [facility] at the time of the encounter? yes    Today's visit will be billed to you and your insurance.    A teledermatology visit is not as thorough as an in-person visit and the quality of the photograph sent may not be of the same quality as that taken by the dermatology clinic.

## 2022-06-27 NOTE — PROGRESS NOTES
"[facility] Dermatology Note  Encounter Date: Jun 27, 2022  Store-and-Forward and Telephone ([phone] ). Location of teledermatologist: [facility].  Start time: 12:43pm. End time: 12:56pm.    Dermatology Problem List:  1. Plaque psoriasis: 1-3% TBSA  -Current Tx: Stelara, triamcinolone 0.1% ointment, desonide 0.05% ointment, calcipotriene 0.005% ointment, Clobetasol  -Past tx: Soriatane 25 mg daily stopped due to pyogenic granulomas 6/2018, Humira,   -Quant gold 9/4/2020  2. Onycholysis of distal right toenails, May be in association with psoriasis.   3. Seborrheic Dermatitis  -Current Tx: clobetasol, ketoconazole 2% shampoo  ____________________________________________     Assessment & Plan:      # Plaque psoriasis - chronic and stable. <1TBSA.  Previously on stalera, has switched to otezla. Holding, due to elevated white blood cell count see heme onc. Scheduled for visit with oncologyin January.  - Continue clobetasol cream BID for up to 2 weeks in a row fr body, solution is fore scalp  - Continue desonide-face and groin  - Continue hydroxyzine at night. Refilled today. Reviewed this can make drows, might increase risk dementia- he is not taking every day  - Continue otezla. Stop drug for suicidal thoughts(mood is good)  - last BUN creatinine reviewed  And will repeat in July      # Elevated WBC count - kept for safety.  - \"Seen by oncology 7/29/21. They are monitoring and currently have not found anything concerning\"  -reviewed record and noticed T cell issues are possible  - pending heme onc visit     # Joint pain, chronic still active.  - Seen by rheumatology    # Well healed scar, left abdomen (previously noted)  -pt reports the dark area at the scar is resolved  -offered derm visit within 4 months and declines, will have primary check     # Seborrheic Dermatitis. No change  - Clobetasol 3x weekly to the scalp, no change, can do for a full 1-2 weeks   " "    Procedures Performed:    None    Follow-up: 1 year in person with derm, see PCP for scar to make sure not issues  ____________________________________________    CC: Psoriasis    HPI:  Mr. To White is a(n) 52 year old male who presents today as a return patient for psoriasis. PT was switched over to Otezla. He does have more spots then with his old drug. \"nothing unbearable.\"    Denies diarrhea, weight loss, mood problems, and cold like symptoms and headache.     Reports some in hair line denies genitals      Has not seen hematology oncology since our last viist   Patient is otherwise feeling well, without additional skin concerns.    Labs Reviewed:  Component      Latest Ref Rng & Units 1/11/2022   Creatinine      0.66 - 1.25 mg/dL 0.96   GFR Estimate      >60 mL/min/1.73m2 >90   Urea Nitrogen      7 - 30 mg/dL 9       Physical Exam:  Vitals: There were no vitals taken for this visit.  SKIN: Teledermatology photos were reviewed; image quality and interpretability:  acceptable. Image date: within last 5 days  - erythematous patche son the trunk and extremities  --reviewed with patient that pigmented lesions are not assessable via photography and would need in person visit    - No other lesions of concern on areas examined.     Medications:  Current Outpatient Medications   Medication     amLODIPine (NORVASC) 10 MG tablet     apremilast (OTEZLA) 30 MG tablet     benazepril (LOTENSIN) 40 MG tablet     calcipotriene 0.005 % OINT     clobetasol (TEMOVATE) 0.05 % external ointment     clobetasol (TEMOVATE) 0.05 % external solution     Fexofenadine HCl (ALLEGRA PO)     hydrOXYzine (ATARAX) 25 MG tablet     Apremilast (OTEZLA) 10 & 20 & 30 MG TBPK     No current facility-administered medications for this visit.      Past Medical/Surgical History:   Patient Active Problem List   Diagnosis     Other psoriasis     Obesity     Essential hypertension with goal blood pressure less than 140/90     Obesity, Class I, BMI " 30-34.9     Benign cyst of both testicles     Morbid obesity (H)     Past Medical History:   Diagnosis Date     Essential hypertension, benign 2000     Hypertension      Obesity      Other psoriasis     since childhood       CC No referring provider defined for this encounter. on close of this encounter.

## 2022-07-14 ASSESSMENT — ENCOUNTER SYMPTOMS
DIZZINESS: 0
ARTHRALGIAS: 1
FEVER: 0
DYSURIA: 0
PARESTHESIAS: 0
HEARTBURN: 0
EYE PAIN: 0
NAUSEA: 0
JOINT SWELLING: 0
HEMATURIA: 0
CHILLS: 0
SHORTNESS OF BREATH: 0
ABDOMINAL PAIN: 0
SORE THROAT: 0
HEADACHES: 1
FREQUENCY: 0
MYALGIAS: 0
CONSTIPATION: 0
COUGH: 0
PALPITATIONS: 0
HEMATOCHEZIA: 0
DIARRHEA: 0
NERVOUS/ANXIOUS: 0
WEAKNESS: 0

## 2022-07-15 ENCOUNTER — OFFICE VISIT (OUTPATIENT)
Dept: FAMILY MEDICINE | Facility: CLINIC | Age: 52
End: 2022-07-15
Payer: COMMERCIAL

## 2022-07-15 VITALS
SYSTOLIC BLOOD PRESSURE: 112 MMHG | HEART RATE: 104 BPM | BODY MASS INDEX: 35.16 KG/M2 | HEIGHT: 74 IN | RESPIRATION RATE: 16 BRPM | WEIGHT: 274 LBS | OXYGEN SATURATION: 97 % | DIASTOLIC BLOOD PRESSURE: 80 MMHG | TEMPERATURE: 97 F

## 2022-07-15 DIAGNOSIS — Z51.81 MEDICATION MONITORING ENCOUNTER: ICD-10-CM

## 2022-07-15 DIAGNOSIS — Z12.5 SCREENING FOR PROSTATE CANCER: ICD-10-CM

## 2022-07-15 DIAGNOSIS — I10 ESSENTIAL HYPERTENSION WITH GOAL BLOOD PRESSURE LESS THAN 140/90: ICD-10-CM

## 2022-07-15 DIAGNOSIS — Z00.00 ROUTINE GENERAL MEDICAL EXAMINATION AT A HEALTH CARE FACILITY: Primary | ICD-10-CM

## 2022-07-15 DIAGNOSIS — N52.9 ERECTILE DYSFUNCTION, UNSPECIFIED ERECTILE DYSFUNCTION TYPE: ICD-10-CM

## 2022-07-15 LAB
ANION GAP SERPL CALCULATED.3IONS-SCNC: 4 MMOL/L (ref 3–14)
BUN SERPL-MCNC: 11 MG/DL (ref 7–30)
CALCIUM SERPL-MCNC: 9.9 MG/DL (ref 8.5–10.1)
CHLORIDE BLD-SCNC: 106 MMOL/L (ref 94–109)
CHOLEST SERPL-MCNC: 188 MG/DL
CO2 SERPL-SCNC: 29 MMOL/L (ref 20–32)
CREAT SERPL-MCNC: 1.25 MG/DL (ref 0.66–1.25)
FASTING STATUS PATIENT QL REPORTED: YES
GFR SERPL CREATININE-BSD FRML MDRD: 70 ML/MIN/1.73M2
GLUCOSE BLD-MCNC: 97 MG/DL (ref 70–99)
HDLC SERPL-MCNC: 47 MG/DL
LDLC SERPL CALC-MCNC: 109 MG/DL
NONHDLC SERPL-MCNC: 141 MG/DL
POTASSIUM BLD-SCNC: 4.1 MMOL/L (ref 3.4–5.3)
PSA SERPL-MCNC: 0.55 UG/L (ref 0–4)
SODIUM SERPL-SCNC: 139 MMOL/L (ref 133–144)
TRIGL SERPL-MCNC: 162 MG/DL

## 2022-07-15 PROCEDURE — G0103 PSA SCREENING: HCPCS | Performed by: STUDENT IN AN ORGANIZED HEALTH CARE EDUCATION/TRAINING PROGRAM

## 2022-07-15 PROCEDURE — 36415 COLL VENOUS BLD VENIPUNCTURE: CPT | Performed by: STUDENT IN AN ORGANIZED HEALTH CARE EDUCATION/TRAINING PROGRAM

## 2022-07-15 PROCEDURE — 99396 PREV VISIT EST AGE 40-64: CPT | Performed by: STUDENT IN AN ORGANIZED HEALTH CARE EDUCATION/TRAINING PROGRAM

## 2022-07-15 PROCEDURE — 80061 LIPID PANEL: CPT | Performed by: STUDENT IN AN ORGANIZED HEALTH CARE EDUCATION/TRAINING PROGRAM

## 2022-07-15 PROCEDURE — 80048 BASIC METABOLIC PNL TOTAL CA: CPT | Performed by: STUDENT IN AN ORGANIZED HEALTH CARE EDUCATION/TRAINING PROGRAM

## 2022-07-15 RX ORDER — AMLODIPINE BESYLATE 10 MG/1
10 TABLET ORAL DAILY
Qty: 90 TABLET | Refills: 3 | Status: SHIPPED | OUTPATIENT
Start: 2022-07-15 | End: 2023-08-11

## 2022-07-15 RX ORDER — SILDENAFIL 25 MG/1
25 TABLET, FILM COATED ORAL DAILY PRN
Qty: 10 TABLET | Refills: 0 | Status: SHIPPED | OUTPATIENT
Start: 2022-07-15 | End: 2023-08-11

## 2022-07-15 RX ORDER — BENAZEPRIL HYDROCHLORIDE 40 MG/1
40 TABLET ORAL DAILY
Qty: 90 TABLET | Refills: 3 | Status: SHIPPED | OUTPATIENT
Start: 2022-07-15 | End: 2023-08-11

## 2022-07-15 ASSESSMENT — ENCOUNTER SYMPTOMS
WEAKNESS: 0
PARESTHESIAS: 0
SORE THROAT: 0
SHORTNESS OF BREATH: 0
ARTHRALGIAS: 1
CHILLS: 0
MYALGIAS: 0
HEARTBURN: 0
JOINT SWELLING: 0
FEVER: 0
HEMATOCHEZIA: 0
PALPITATIONS: 0
HEMATURIA: 0
EYE PAIN: 0
DYSURIA: 0
DIZZINESS: 0
FREQUENCY: 0
NERVOUS/ANXIOUS: 0
HEADACHES: 1
ABDOMINAL PAIN: 0
COUGH: 0
NAUSEA: 0
CONSTIPATION: 0
DIARRHEA: 0

## 2022-07-15 ASSESSMENT — PAIN SCALES - GENERAL: PAINLEVEL: NO PAIN (0)

## 2022-07-15 NOTE — PROGRESS NOTES
SUBJECTIVE:   CC: To White is an 52 year old male who presents for preventative health visit.       Patient has been advised of split billing requirements and indicates understanding: Yes  Healthy Habits:     Getting at least 3 servings of Calcium per day:  Yes    Bi-annual eye exam:  Yes    Dental care twice a year:  Yes    Sleep apnea or symptoms of sleep apnea:  None    Diet:  Regular (no restrictions)    Frequency of exercise:  1 day/week    Duration of exercise:  30-45 minutes    Taking medications regularly:  Yes    Medication side effects:  Not applicable    PHQ-2 Total Score: 0    Additional concerns today:  No          Today's PHQ-2 Score:   PHQ-2 ( 1999 [employer]) 7/14/2022   Q1: Little interest or pleasure in doing things 0   Q2: Feeling down, depressed or hopeless 0   PHQ-2 Score 0   PHQ-2 Total Score (12-17 Years)- Positive if 3 or more points; Administer PHQ-A if positive -   Q1: Little interest or pleasure in doing things Not at all   Q2: Feeling down, depressed or hopeless Not at all   PHQ-2 Score 0       Abuse: Current or Past(Physical, Sexual or Emotional)- No  Do you feel safe in your environment? Yes    Have you ever done Advance Care Planning? (For example, a Health Directive, POLST, or a discussion with a medical provider or your loved ones about your wishes): No, advance care planning information given to patient to review.  Patient declined advance care planning discussion at this time.    Social History     Tobacco Use     Smoking status: Never Smoker     Smokeless tobacco: Never Used   Substance Use Topics     Alcohol use: Yes     Comment: Rarely     If you drink alcohol do you typically have >3 drinks per day or >7 drinks per week? No    No flowsheet data found.    Last PSA: No results found for: PSA    Reviewed orders with patient. Reviewed health maintenance and updated orders accordingly - Yes  Lab work is in process    Reviewed and updated as needed this visit by clinical staff    "Tobacco  Allergies  Meds     Fam Hx  Soc Hx          Reviewed and updated as needed this visit by Provider                   Past Medical History:   Diagnosis Date     Essential hypertension, benign 01/01/2000     Hypertension      Obesity      Other psoriasis     since childhood      Past Surgical History:   Procedure Laterality Date     CHOLECYSTECTOMY       COLONOSCOPY       LAPAROSCOPIC CHOLECYSTECTOMY  07/03/2012    Procedure: LAPAROSCOPIC CHOLECYSTECTOMY;  Laparoscopic Cholecystectomy;  Surgeon: Kulwinder Hollins MD;  Location:  OR     NO HISTORY OF SURGERY         Review of Systems   Constitutional: Negative for chills and fever.   HENT: Negative for congestion, ear pain, hearing loss and sore throat.    Eyes: Negative for pain and visual disturbance.   Respiratory: Negative for cough and shortness of breath.    Cardiovascular: Negative for chest pain, palpitations and peripheral edema.   Gastrointestinal: Negative for abdominal pain, constipation, diarrhea, heartburn, hematochezia and nausea.   Genitourinary: Positive for impotence. Negative for dysuria, frequency, genital sores, hematuria, penile discharge and urgency.   Musculoskeletal: Positive for arthralgias. Negative for joint swelling and myalgias.   Skin: Negative for rash.   Neurological: Positive for headaches. Negative for dizziness, weakness and paresthesias.   Psychiatric/Behavioral: Negative for mood changes. The patient is not nervous/anxious.        OBJECTIVE:   /80   Pulse 104   Temp 97  F (36.1  C) (Temporal)   Resp 16   Ht 1.89 m (6' 2.41\")   Wt 124.3 kg (274 lb)   SpO2 97%   BMI 34.79 kg/m      Physical Exam  GENERAL: healthy, alert and no distress  EYES: Eyes grossly normal to inspection, PERRL and conjunctivae and sclerae normal  HENT: ear canals and TM's normal, nose and mouth without ulcers or lesions  NECK: no adenopathy, no asymmetry, masses, or scars and thyroid normal to palpation  RESP: lungs clear to " auscultation - no rales, rhonchi or wheezes  CV: regular rate and rhythm, normal S1 S2, no S3 or S4, no murmur, click or rub, no peripheral edema and peripheral pulses strong  ABDOMEN: soft, nontender, no hepatosplenomegaly, no masses and bowel sounds normal  MS: no gross musculoskeletal defects noted, no edema  SKIN: no suspicious lesions or rashes  NEURO: Normal strength and tone, mentation intact and speech normal  PSYCH: mentation appears normal, affect normal/bright    Diagnostic Test Results:  Labs reviewed in Epic    ASSESSMENT/PLAN:   To was seen today for physical.    Diagnoses and all orders for this visit:    Routine general medical examination at a health care facility    Essential hypertension with goal blood pressure less than 140/90  -     amLODIPine (NORVASC) 10 MG tablet; Take 1 tablet (10 mg) by mouth daily  -     benazepril (LOTENSIN) 40 MG tablet; Take 1 tablet (40 mg) by mouth daily  -     Basic metabolic panel  (Ca, Cl, CO2, Creat, Gluc, K, Na, BUN); Future  -     Lipid panel reflex to direct LDL Fasting; Future  -     Basic metabolic panel  (Ca, Cl, CO2, Creat, Gluc, K, Na, BUN)  -     Lipid panel reflex to direct LDL Fasting    Erectile dysfunction, unspecified erectile dysfunction type  Seems to be a complex issue at times.  We will do small amount of Viagra as needed to help with this.  -     sildenafil (VIAGRA) 25 MG tablet; Take 1 tablet (25 mg) by mouth daily as needed (intercourse)    Screening for prostate cancer  -     PSA, screen; Future  -     PSA, screen    Medication monitoring encounter  -     Cancel: Urea nitrogen  -     Cancel: Creatinine    Other orders  -     REVIEW OF HEALTH MAINTENANCE PROTOCOL ORDERS        Patient has been advised of split billing requirements and indicates understanding: Yes    COUNSELING:   Reviewed preventive health counseling, as reflected in patient instructions       Regular exercise       Healthy diet/nutrition       Vision screening        "Hearing screening       Alcohol Use        Family planning       Safe sex practices/STD prevention       Colorectal cancer screening       Prostate cancer screening    Estimated body mass index is 34.79 kg/m  as calculated from the following:    Height as of this encounter: 1.89 m (6' 2.41\").    Weight as of this encounter: 124.3 kg (274 lb).     Weight management plan: Discussed healthy diet and exercise guidelines    He reports that he has never smoked. He has never used smokeless tobacco.      Counseling Resources:  ATP IV Guidelines  Pooled Cohorts Equation Calculator  FRAX Risk Assessment  ICSI Preventive Guidelines  Dietary Guidelines for Americans, 2010  USDA's MyPlate  ASA Prophylaxis  Lung CA Screening    Christo Richard MD  [facility]  "

## 2022-09-01 ENCOUNTER — TELEPHONE (OUTPATIENT)
Dept: DERMATOLOGY | Facility: CLINIC | Age: 52
End: 2022-09-01

## 2022-09-01 NOTE — TELEPHONE ENCOUNTER
PA Initiation    Medication: PA Pending Otezla renewal  Insurance Company: [employer] ([facility]) - Phone [phone] Fax [phone]  Pharmacy Filling the Rx:    Filling Pharmacy Phone:    Filling Pharmacy Fax:    Start Date: 9/1/2022

## 2022-09-09 NOTE — TELEPHONE ENCOUNTER
Prior Authorization Approval    Authorization Effective Date: 9/1/2022  Authorization Expiration Date: 9/1/2023  Medication: Otezla renewal  Approved Dose/Quantity: 60 for 30 days  Reference #: Key: [id number]   Insurance Company: [email or web address] ([facility]) - Phone [phone] Fax [phone]  Expected CoPay:       CoPay Card Available:      Foundation Assistance Needed:    Which Pharmacy is filling the prescription (Not needed for infusion/clinic administered): [facility] - [address] [place] [address]  Pharmacy Notified: No  Patient Notified: No

## 2022-10-17 ENCOUNTER — VIRTUAL VISIT (OUTPATIENT)
Dept: DERMATOLOGY | Facility: CLINIC | Age: 52
End: 2022-10-17
Payer: COMMERCIAL

## 2022-10-17 DIAGNOSIS — Z76.89 ENCOUNTER PRIOR TO INITIATION OF MEDICATION: ICD-10-CM

## 2022-10-17 DIAGNOSIS — Z45.02: Primary | ICD-10-CM

## 2022-10-17 PROCEDURE — 86706 HEP B SURFACE ANTIBODY: CPT | Performed by: DERMATOLOGY

## 2022-10-17 PROCEDURE — 87340 HEPATITIS B SURFACE AG IA: CPT | Performed by: DERMATOLOGY

## 2022-10-17 PROCEDURE — 80053 COMPREHEN METABOLIC PANEL: CPT | Performed by: DERMATOLOGY

## 2022-10-17 PROCEDURE — 99214 OFFICE O/P EST MOD 30 MIN: CPT | Performed by: DERMATOLOGY

## 2022-10-17 PROCEDURE — 87389 HIV-1 AG W/HIV-1&-2 AB AG IA: CPT | Performed by: DERMATOLOGY

## 2022-10-17 NOTE — NURSING NOTE
Chief Complaint   Patient presents with     Follow Up     Follow up for tamicala     Teledermatology Nurse Call Patients:     Are you in the [facility] at the time of the encounter? yes    Today's visit will be billed to you and your insurance.    A teledermatology visit is not as thorough as an in-person visit and the quality of the photograph sent may not be of the same quality as that taken by the dermatology clinic.    Allergies and medications reviewed with patient.  KEITH Ruby

## 2022-10-17 NOTE — LETTER
10/17/2022         RE: To White  [address] [facility]  [place] [address]        Dear Colleague,    Thank you for referring your patient, To White, to the [facility]. Please see a copy of my visit note below.    [facility] Dermatology Note  Encounter Date: Oct 17, 2022  Store-and-Forward and Telephone ([phone]). Location of teledermatologist: [facility].  Start time: 1:35PM End time: 2:04pm.    Dermatology Problem List:  1. Plaque psoriasis: 1-3% TBSA  -Current Tx: Otezla  triamcinolone 0.1% ointment, desonide 0.05% ointment, calcipotriene 0.005% ointment, Clobetasol  -Past tx: Soriatane 25 mg daily stopped due to pyogenic granulomas 6/2018, Marci Gan(had elevated WBC and saw heme)  -Quant gold 9/4/2020  2. Onycholysis of distal right toenails, May be in association with psoriasis.   3. Seborrheic Dermatitis  -Current Tx: clobetasol, ketoconazole 2% shampoo  ____________________________________________     Assessment & Plan:      # Plaque psoriasis - now on otezla. Does have joint pain and see rheum for osteoarthritis  - Continue clobetasol cream BID for up to 2 weeks in a row for body  - Continue otezla. Stop drug for suicidal thoughts (mood is good)  - last BUN creatinine reviewed    -Patient wants to trial biologic. Reviewed heme onc record and he has been discharge.   - patient will check on colonoscopy prior to start  SKYRIZI  dosing schedule: The recommended dosage is 150 mg administered by subcutaneous injection at Week 0, Week 4, and then every 12 weeks thereafter.   -Side effects of treatment with biologic therapy were reviewed including but not limited to immune suppression, increased susceptibility to infection, injection site and systemic reactions, and possible increased risk of lymphoma and other malignancy. The patient denies history of hepatitis, personal or first degree relative with demyelinating disease,  "history of heart failure, history of malignancy, hematologic disorders, other immune compromising medications/disorders, tuberculosis or BCG vaccination.  Discussed need to contact clinic if patient is ill or not feeling well as we may hold medication.-Reviewed additional side effects of this specific biologic medication including: Upper respiratory infections, Headache, Fatigue, Injection site reactions, tinea. The handout for this medication was completely reviewed with the patient. Discussed that live vaccinations should not be given while on this medication and clinic should be contact prior to vaccinations.  Per the Medical Board of the National Psoriasis Foundation: Vaccination in adult patients on systemic therapy for psoriasis includes need for MMR titer or history, Shingles vaccination prior, up to date tetanus, and pneumonia vaccine.   Patient should also get yearly flu vaccination.      -Baseline labs obtained including HIV, hepatitis B surface antigen, hepatitis core IgM and IgG(if core positive with consult hepatology), hepatitis B surface antibody,  hepatitis C antibody, baseline CBC, alt/ast, MMR titer, CXR and tb testing.      Vaccines- covid and flu, has had pneumonia, reviewed CDC guidelines              # Elevated WBC count -keeping this section in record for safety  - \"Seen by oncology, discharge, recommended yearly CBC\"  -follows with heme onc           # Seborrheic Dermatitis  -clobetasol three times per week, prn    Procedures Performed:    None    Follow-up: 4 week(s) virtually (telephone with photos), or earlier for new or changing lesions    Staff:     Jacey Cervantes MD    Department of Dermatology  [facility]: Phone: [phone], Fax:[phone]  [facility]: Phone: [phone], Fax: [phone]      ____________________________________________    CC: Follow Up " (Follow up for otezla)    HPI:  Mr. To White is a(n) 52 year old male who presents today as a return patient for otezla      He wants to go back to [facility]. He is breaking out. No diarhia, SI or cold symptoms.     Patient is otherwise feeling well, without additional skin concerns.    Labs Reviewed:  Findings:        A 5 mm polyp was found in the cecum. The polyp was sessile. The polyp        was removed with a cold snare. Resection and retrieval were complete.                                                                                     Impression:          - Colon polyp.   Recommendation:      - Await pathology results.                        - Patient has a contact number available for                        emergencies. The signs and symptoms of potential delayed                        complications were discussed with the patient. Return to                        normal activities tomorrow. Written discharge                        instructions were provided to the patient.     Physical Exam:  Vitals: There were no vitals taken for this visit.  SKIN: Teledermatology photos were reviewed; image quality and interpretability:  acceptable. Image date: see upload date.  -  Well demarcated nummular plaques trunk and ext  - No other lesions of concern on areas examined.     Medications:  Current Outpatient Medications   Medication     amLODIPine (NORVASC) 10 MG tablet     apremilast (OTEZLA) 30 MG tablet     benazepril (LOTENSIN) 40 MG tablet     calcipotriene 0.005 % OINT     clobetasol (TEMOVATE) 0.05 % external ointment     clobetasol (TEMOVATE) 0.05 % external solution     desonide (DESOWEN) 0.05 % external ointment     hydrOXYzine (ATARAX) 25 MG tablet     sildenafil (VIAGRA) 25 MG tablet     Fexofenadine HCl (ALLEGRA PO)     No current facility-administered medications for this visit.      Past Medical/Surgical History:   Patient Active Problem List   Diagnosis     Other psoriasis     Obesity      Essential hypertension with goal blood pressure less than 140/90     Obesity, Class I, BMI 30-34.9     Benign cyst of both testicles     Morbid obesity (H)     Past Medical History:   Diagnosis Date     Essential hypertension, benign 01/01/2000     Hypertension      Obesity      Other psoriasis     since childhood       CC No referring provider defined for this encounter. on close of this encounter.      Again, thank you for allowing me to participate in the care of your patient.        Sincerely,        Jacey Cervantes MD

## 2022-10-17 NOTE — PROGRESS NOTES
[facility] Dermatology Note  Encounter Date: Oct 17, 2022  Store-and-Forward and Telephone ([phone]). Location of teledermatologist: [facility].  Start time: 1:35PM End time: 2:04pm.    Dermatology Problem List:  1. Plaque psoriasis: 1-3% TBSA  -Current Tx: Otezla  triamcinolone 0.1% ointment, desonide 0.05% ointment, calcipotriene 0.005% ointment, Clobetasol  -Past tx: Soriatane 25 mg daily stopped due to pyogenic granulomas 6/2018, Marci Gan(had elevated WBC and saw heme)  -Quant gold 9/4/2020  2. Onycholysis of distal right toenails, May be in association with psoriasis.   3. Seborrheic Dermatitis  -Current Tx: clobetasol, ketoconazole 2% shampoo  ____________________________________________     Assessment & Plan:      # Plaque psoriasis - now on otezla. Does have joint pain and see rheum for osteoarthritis  - Continue clobetasol cream BID for up to 2 weeks in a row for body  - Continue otezla. Stop drug for suicidal thoughts (mood is good)  - last BUN creatinine reviewed    -Patient wants to trial biologic. Reviewed heme onc record and he has been discharge.   - patient will check on colonoscopy prior to start  SKYRIZI  dosing schedule: The recommended dosage is 150 mg administered by subcutaneous injection at Week 0, Week 4, and then every 12 weeks thereafter.   -Side effects of treatment with biologic therapy were reviewed including but not limited to immune suppression, increased susceptibility to infection, injection site and systemic reactions, and possible increased risk of lymphoma and other malignancy. The patient denies history of hepatitis, personal or first degree relative with demyelinating disease, history of heart failure, history of malignancy, hematologic disorders, other immune compromising medications/disorders, tuberculosis or BCG vaccination.  Discussed need to contact clinic if patient is ill or not feeling well as we may hold  "medication.-Reviewed additional side effects of this specific biologic medication including: Upper respiratory infections, Headache, Fatigue, Injection site reactions, tinea. The handout for this medication was completely reviewed with the patient. Discussed that live vaccinations should not be given while on this medication and clinic should be contact prior to vaccinations.  Per the Medical Board of the National Psoriasis Foundation: Vaccination in adult patients on systemic therapy for psoriasis includes need for MMR titer or history, Shingles vaccination prior, up to date tetanus, and pneumonia vaccine.   Patient should also get yearly flu vaccination.      -Baseline labs obtained including HIV, hepatitis B surface antigen, hepatitis core IgM and IgG(if core positive with consult hepatology), hepatitis B surface antibody,  hepatitis C antibody, baseline CBC, alt/ast, MMR titer, CXR and tb testing.      Vaccines- covid and flu, has had pneumonia, reviewed CDC guidelines              # Elevated WBC count -keeping this section in record for safety  - \"Seen by oncology, discharge, recommended yearly CBC\"  -follows with heme onc           # Seborrheic Dermatitis  -clobetasol three times per week, prn    Procedures Performed:    None    Follow-up: 4 week(s) virtually (telephone with photos), or earlier for new or changing lesions    Staff:     Jacey Cervantes MD    Department of Dermatology  [facility]: Phone: [phone], Fax:[phone]  [facility]: Phone: [phone], Fax: [phone]      ____________________________________________    CC: Follow Up (Follow up for otezla)    HPI:  Mr. To White is a(n) 52 year old male who presents today as a return patient for otezla      He wants to go back to biologic. He is breaking out. No diarhia, SI or cold symptoms.     Patient is otherwise " feeling well, without additional skin concerns.    Labs Reviewed:  Findings:        A 5 mm polyp was found in the cecum. The polyp was sessile. The polyp        was removed with a cold snare. Resection and retrieval were complete.                                                                                     Impression:          - Colon polyp.   Recommendation:      - Await pathology results.                        - Patient has a contact number available for                        emergencies. The signs and symptoms of potential delayed                        complications were discussed with the patient. Return to                        normal activities tomorrow. Written discharge                        instructions were provided to the patient.     Physical Exam:  Vitals: There were no vitals taken for this visit.  SKIN: Teledermatology photos were reviewed; image quality and interpretability:  acceptable. Image date: see upload date.  -  Well demarcated nummular plaques trunk and ext  - No other lesions of concern on areas examined.     Medications:  Current Outpatient Medications   Medication     amLODIPine (NORVASC) 10 MG tablet     apremilast (OTEZLA) 30 MG tablet     benazepril (LOTENSIN) 40 MG tablet     calcipotriene 0.005 % OINT     clobetasol (TEMOVATE) 0.05 % external ointment     clobetasol (TEMOVATE) 0.05 % external solution     desonide (DESOWEN) 0.05 % external ointment     hydrOXYzine (ATARAX) 25 MG tablet     sildenafil (VIAGRA) 25 MG tablet     Fexofenadine HCl (ALLEGRA PO)     No current facility-administered medications for this visit.      Past Medical/Surgical History:   Patient Active Problem List   Diagnosis     Other psoriasis     Obesity     Essential hypertension with goal blood pressure less than 140/90     Obesity, Class I, BMI 30-34.9     Benign cyst of both testicles     Morbid obesity (H)     Past Medical History:   Diagnosis Date     Essential hypertension, benign 01/01/2000      Hypertension      Obesity      Other psoriasis     since childhood       CC No referring provider defined for this encounter. on close of this encounter.

## 2022-10-17 NOTE — PATIENT INSTRUCTIONS
[facility] Dermatology Visit    Thank you for allowing us to participate in your care. Your findings, instructions and follow-up plan are as follows:    Romy              When should I call my doctor?  If you are worsening or not improving, please, contact us or seek urgent care as noted below.     Who should I call with questions (adults)?  [facility] (adult and pediatric): [phone]  [facility] (adult): [phone]  For urgent needs outside of business hours call the [facility] at [phone] and ask for the dermatology resident on call  If this is a medical emergency and you are unable to reach an ER, Call 911    Who should I call with questions (pediatric)?  [facility]- Pediatric Dermatology  Dr. Torri Zimmerman, Dr. Geoff Sifuentes, Dr. Shruthi Ghotra, Ivelisse Hamilton, PA  Dr. Diivne Florence, Dr. Aimee Gunn & Dr. Jeremias Gaston  Non Urgent  Nurse Triage Line; [phone]- Kasie and Purvi ORTIZ Care Coordinators   Krysta (/Complex ) [phone]    If you need a prescription refill, please contact your pharmacy. Refills are approved or denied by our physicians during normal business hours, Monday through Fridays  Per office policy, refills will not be granted if you have not been seen within the past year (or sooner depending on your child's condition).    Scheduling Information:  Pediatric Appointment Scheduling and Call Center [phone]  Radiology Scheduling- [phone]  Sedation Unit Scheduling- [phone]  [facility] Scheduling- General [phone]; Pediatric Dermatology [phone]  Main  Services: [phone]  Mozambican: [phone]  Venezuelan: [phone]  Hmong/Roldan/Setswana: [phone]  Preadmission Nursing Department Fax Number: [phone] (fax all pre-operative paperwork to this number)    For urgent matters arising during  evenings, weekends, or holidays that cannot wait for normal business hours please call [phone] and ask for the dermatology resident on call to be paged.

## 2022-10-19 ENCOUNTER — LAB (OUTPATIENT)
Dept: LAB | Facility: CLINIC | Age: 52
End: 2022-10-19
Payer: COMMERCIAL

## 2022-10-19 DIAGNOSIS — Z76.89 ENCOUNTER PRIOR TO INITIATION OF MEDICATION: ICD-10-CM

## 2022-10-19 LAB
ALBUMIN SERPL-MCNC: 3.8 G/DL (ref 3.4–5)
ALP SERPL-CCNC: 94 U/L (ref 40–150)
ALT SERPL W P-5'-P-CCNC: 22 U/L (ref 0–70)
ANION GAP SERPL CALCULATED.3IONS-SCNC: 6 MMOL/L (ref 3–14)
AST SERPL W P-5'-P-CCNC: 16 U/L (ref 0–45)
BASOPHILS # BLD AUTO: 0.1 10E3/UL (ref 0–0.2)
BASOPHILS NFR BLD AUTO: 1 %
BILIRUB SERPL-MCNC: 0.3 MG/DL (ref 0.2–1.3)
BUN SERPL-MCNC: 9 MG/DL (ref 7–30)
CALCIUM SERPL-MCNC: 8.9 MG/DL (ref 8.5–10.1)
CHLORIDE BLD-SCNC: 107 MMOL/L (ref 94–109)
CO2 SERPL-SCNC: 25 MMOL/L (ref 20–32)
CREAT SERPL-MCNC: 0.93 MG/DL (ref 0.66–1.25)
EOSINOPHIL # BLD AUTO: 0.5 10E3/UL (ref 0–0.7)
EOSINOPHIL NFR BLD AUTO: 5 %
ERYTHROCYTE [DISTWIDTH] IN BLOOD BY AUTOMATED COUNT: 13 % (ref 10–15)
GFR SERPL CREATININE-BSD FRML MDRD: >90 ML/MIN/1.73M2
GLUCOSE BLD-MCNC: 130 MG/DL (ref 70–99)
HCT VFR BLD AUTO: 42.3 % (ref 40–53)
HGB BLD-MCNC: 14.3 G/DL (ref 13.3–17.7)
IMM GRANULOCYTES # BLD: 0.1 10E3/UL
IMM GRANULOCYTES NFR BLD: 1 %
LYMPHOCYTES # BLD AUTO: 3.4 10E3/UL (ref 0.8–5.3)
LYMPHOCYTES NFR BLD AUTO: 33 %
MCH RBC QN AUTO: 30.4 PG (ref 26.5–33)
MCHC RBC AUTO-ENTMCNC: 33.8 G/DL (ref 31.5–36.5)
MCV RBC AUTO: 90 FL (ref 78–100)
MONOCYTES # BLD AUTO: 0.7 10E3/UL (ref 0–1.3)
MONOCYTES NFR BLD AUTO: 7 %
NEUTROPHILS # BLD AUTO: 5.7 10E3/UL (ref 1.6–8.3)
NEUTROPHILS NFR BLD AUTO: 53 %
NRBC # BLD AUTO: 0 10E3/UL
NRBC BLD AUTO-RTO: 0 /100
PLATELET # BLD AUTO: 405 10E3/UL (ref 150–450)
POTASSIUM BLD-SCNC: 3.6 MMOL/L (ref 3.4–5.3)
PROT SERPL-MCNC: 8 G/DL (ref 6.8–8.8)
RBC # BLD AUTO: 4.7 10E6/UL (ref 4.4–5.9)
SODIUM SERPL-SCNC: 138 MMOL/L (ref 133–144)
WBC # BLD AUTO: 10.5 10E3/UL (ref 4–11)

## 2022-10-19 PROCEDURE — 85025 COMPLETE CBC W/AUTO DIFF WBC: CPT

## 2022-10-19 PROCEDURE — 36415 COLL VENOUS BLD VENIPUNCTURE: CPT

## 2022-10-19 PROCEDURE — 86704 HEP B CORE ANTIBODY TOTAL: CPT

## 2022-10-19 PROCEDURE — 86481 TB AG RESPONSE T-CELL SUSP: CPT

## 2022-10-20 LAB
HBV CORE AB SERPL QL IA: NONREACTIVE
HBV SURFACE AB SERPL IA-ACNC: 0 M[IU]/ML
HBV SURFACE AB SERPL IA-ACNC: NONREACTIVE M[IU]/ML
HBV SURFACE AG SERPL QL IA: NONREACTIVE
HIV 1+2 AB+HIV1 P24 AG SERPL QL IA: NONREACTIVE

## 2022-10-21 LAB
GAMMA INTERFERON BACKGROUND BLD IA-ACNC: 0.01 IU/ML
M TB IFN-G BLD-IMP: NEGATIVE
M TB IFN-G CD4+ BCKGRND COR BLD-ACNC: 9.99 IU/ML
MITOGEN IGNF BCKGRD COR BLD-ACNC: 0 IU/ML
MITOGEN IGNF BCKGRD COR BLD-ACNC: 0.01 IU/ML
QUANTIFERON MITOGEN: 10 IU/ML
QUANTIFERON NIL TUBE: 0.01 IU/ML
QUANTIFERON TB1 TUBE: 0.02 IU/ML
QUANTIFERON TB2 TUBE: 0.01

## 2022-11-17 NOTE — PROGRESS NOTES
"  [facility] Dermatology Note  Encounter Date: Nov 18, 2022  Office Visit     Dermatology Problem List:  1. Plaque psoriasis: 1-3% TBSA  - Current Tx: Otezla  triamcinolone 0.1% ointment, desonide 0.05% ointment, calcipotriene 0.005% ointment, Clobetasol  - Past tx: Soriatane 25 mg daily stopped due to pyogenic granulomas 6/2018, Marci Gan(had elevated WBC and saw heme)  - Quant gold 9/4/2020  2. Onycholysis of distal right toenails, May be in association with psoriasis.   3. Seborrheic Dermatitis  - Current Tx: clobetasol, ketoconazole 2% shampoo  ____________________________________________     Assessment & Plan:      # Plaque psoriasis - Patient reports no improvement on Otezla, reports joint pain. Patient reports stable mood on Otezla. Normal skin exam today. Wants Skyrizi, will get colonoscopy, understands risk of starting prior  - Recommended COVID vaccination.declined. has had flue and pneumonia  - Continue clobetasol cream BID for up to 2 weeks in a row for body  - Continue Otezla until SKYRIZI is approved, then hold.  - Start Westcort cream BID x 2 weeks to the face, then weekends only x 2 weeks. Repeat if recurring.  - Patient need colonoscopy prior to SKYRIZI start.  - Start SKYRIZI injections. Hold if ill.   - Labs reviewed: HIV, Hep B, Hep C, CBC, MMR, CXR    - Reviewed side effects including immune suppression and susceptibility to infection, headache, fatigue, discomfort at injection site. Reviewed needed vaccinations.    - Follow up in 3 months.     \"SKYRIZI  dosing schedule: The recommended dosage is 150 mg administered by subcutaneous injection at Week 0, Week 4, and then every 12 weeks thereafter.\"      # Elevated WBC count -keeping this section in record for safety  - \"Seen by oncology, discharge, recommended yearly CBC\"  -follows with heme onc       Procedures Performed:   None.    Follow-up: 3 month(s) in-person for follow up, or earlier for new or changing " lesions    Staff and Scribe:     Scribe Disclosure:   I, Shiavm English, am serving as a scribe to document services personally performed by this physician, Dr. Jacey Cervantes, based on data collection and the provider's statements to me.       Provider Disclosure:   The documentation recorded by the scribe accurately reflects the services I personally performed and the decisions made by me.    Jacey Cervantes MD    Department of Dermatology  [facility]: Phone: [phone], Fax:[phone]  [facility]: Phone: [phone], Fax: [phone]    ____________________________________________    CC: Skin Check (Discuss starting SKYRIZI, Stellara and Otezla did not help psoriasis. )    HPI:  Mr. To White is a(n) 52 year old male who presents today as a return patient for a skin check.     Last seen virtually 10/17/22 for plaque psoriasis. At that time, patient was instructed to continue clobetasol cream twice daily for up to 2 weeks, and continue Otezla.     Today, he notes that Stelara and Otezla have not been improving his psoriasis.     Patient is otherwise feeling well, without additional skin concerns.    Labs Reviewed:  N/A    Physical Exam:  Vitals: There were no vitals taken for this visit.  SKIN: Total skin excluding the undergarment areas was performed. The exam included the head/face, neck, both arms, chest, back, abdomen, both legs, digits and/or nails. Declines genital exam  - Erythematous plaques on the forehead and scalp, scattered diffusely across the back.   - There are erythematous well demarcated plaques with silvery micaceous scale on the trunk, shins.    - No other lesions of concern on areas examined.     Medications:  Current Outpatient Medications   Medication     amLODIPine (NORVASC) 10 MG tablet     apremilast (OTEZLA) 30 MG tablet     benazepril (LOTENSIN) 40 MG tablet      calcipotriene 0.005 % OINT     clobetasol (TEMOVATE) 0.05 % external ointment     clobetasol (TEMOVATE) 0.05 % external solution     desonide (DESOWEN) 0.05 % external ointment     Fexofenadine HCl (ALLEGRA PO)     hydrOXYzine (ATARAX) 25 MG tablet     sildenafil (VIAGRA) 25 MG tablet     No current facility-administered medications for this visit.      Past Medical History:   Patient Active Problem List   Diagnosis     Other psoriasis     Obesity     Essential hypertension with goal blood pressure less than 140/90     Obesity, Class I, BMI 30-34.9     Benign cyst of both testicles     Morbid obesity (H)     Past Medical History:   Diagnosis Date     Cholecystitis      Essential hypertension, benign 01/01/2000     Hypertension      Obesity      Other psoriasis     since childhood     Tubular adenoma         CC No referring provider defined for this encounter. on close of this encounter.

## 2022-11-18 ENCOUNTER — OFFICE VISIT (OUTPATIENT)
Dept: DERMATOLOGY | Facility: CLINIC | Age: 52
End: 2022-11-18
Payer: COMMERCIAL

## 2022-11-18 DIAGNOSIS — L40.9 PSORIASIS: Primary | ICD-10-CM

## 2022-11-18 PROCEDURE — 99214 OFFICE O/P EST MOD 30 MIN: CPT | Performed by: DERMATOLOGY

## 2022-11-18 RX ORDER — HYDROCORTISONE VALERATE CREAM 2 MG/G
CREAM TOPICAL
Qty: 60 G | Refills: 2 | Status: SHIPPED | OUTPATIENT
Start: 2022-11-18 | End: 2023-06-30

## 2022-11-18 ASSESSMENT — PAIN SCALES - GENERAL: PAINLEVEL: NO PAIN (0)

## 2022-11-18 NOTE — PATIENT INSTRUCTIONS
USE SKYRIZI is a prescription medicine used to treat adults with moderate to severe plaque psoriasis who may benefit from taking injections or pills (systemic therapy) or treatment using ultraviolet or UV light (phototherapy).    IMPORTANT SAFETY INFORMATION    What is the most important information I should know about SKYRIZI  (risankizumab-rzaa)?  SKYRIZI may cause serious side effects, including infections. SKYRIZI is a prescription medicine that may lower the ability of your immune system to fight infections and may increase your risk of infections. Your healthcare provider should check you for infections and tuberculosis (TB) before starting treatment with SKYRIZI and may treat you for TB before you begin treatment with SKYRIZI if you have a history of TB or have active TB. Your healthcare provider should watch you closely for signs and symptoms of TB during and after treatment with SKYRIZI.    Tell your healthcare provider right away if you have an infection or have symptoms of an infection, including:  fever, sweats, or chills  muscle aches  weight loss  cough  warm, red, or painful skin or sores on your body different from your psoriasis  diarrhea or stomach pain  shortness of breath  blood in your mucus (phlegm)  burning when you urinate or urinating more often than normal  Before using SKYRIZI, tell your healthcare provider about all of your medical conditions, including if you:  have any of the conditions or symptoms listed in the section  What is the most important information I should know about SKYRIZI?   have an infection that does not go away or that keeps coming back.  have TB or have been in close contact with someone with TB.  have recently received or are scheduled to receive an immunization (vaccine). Medications that interact with the immune system may increase your risk of getting an infection after receiving live vaccines. You should avoid receiving live vaccines right before, during, or  right after treatment with SKYRIZI. Tell your healthcare provider that you are taking SKYRIZI before receiving a vaccine.  are pregnant or plan to become pregnant. It is not known if SKYRIZI can harm your unborn baby.  are breastfeeding or plan to breastfeed. It is not known if SKYRIZI passes into your breast milk.  Tell your healthcare provider about all the medicines you take, including prescription and over-the-counter medicines, vitamins, and herbal supplements.    What are the possible side effects of SKYRIZI?  SKYRIZI may cause serious side effects. See  What is the most important information I should know about SKYRIZI?     The most common side effects of SKYRIZI include upper respiratory infections, feeling tired, fungal skin infections, headache, and injection site reactions.    These are not all the possible side effects of SKYRIZI. Call your doctor for medical advice about side effects.    Use SKYRIZI exactly as your healthcare provider tells you to use it.    SKYRIZI is available in a 150 mg/mL prefilled syringe and pen.    [id number]    Please see the Full Prescribing Information, including the Medication Guide, for SKYRIZI.    You are encouraged to report negative side effects of prescription drugs to the FDA. Visit www.fda.gov/medwatch or call 6-595-OHL-1829.  If you are having difficulty paying for your medicine, [employer] may be able to help. Visit [email or web address] to learn more.

## 2022-11-18 NOTE — NURSING NOTE
To White's chief complaint for this visit includes:  Chief Complaint   Patient presents with     Skin Check     Discuss starting SKYRIZI, Stellara and Otezla did not help psoriasis.      PCP: Annel Yusuf    Referring Provider:  No referring provider defined for this encounter.    There were no vitals taken for this visit.  No Pain (0)        Allergies   Allergen Reactions     No Known Allergies          Do you need any medication refills at today's visit? No    Daan Marquez CMA

## 2022-11-18 NOTE — LETTER
"    11/18/2022         RE: To White  [address] [facility] [address]        Dear Colleague,    Thank you for referring your patient, To White, to the [facility]. Please see a copy of my visit note below.      [facility] Dermatology Note  Encounter Date: Nov 18, 2022  Office Visit     Dermatology Problem List:  1. Plaque psoriasis: 1-3% TBSA  - Current Tx: Otezla  triamcinolone 0.1% ointment, desonide 0.05% ointment, calcipotriene 0.005% ointment, Clobetasol  - Past tx: Soriatane 25 mg daily stopped due to pyogenic granulomas 6/2018, Marci Gan(had elevated WBC and saw heme)  - Quant gold 9/4/2020  2. Onycholysis of distal right toenails, May be in association with psoriasis.   3. Seborrheic Dermatitis  - Current Tx: clobetasol, ketoconazole 2% shampoo  ____________________________________________     Assessment & Plan:      # Plaque psoriasis - Patient reports no improvement on Otezla, reports joint pain. Patient reports stable mood on Otezla. Normal skin exam today. Wants Skyrizi, will get colonoscopy, understands risk of starting prior  - Recommended COVID vaccination.declined. has had flue and pneumonia  - Continue clobetasol cream BID for up to 2 weeks in a row for body  - Continue Otezla until SKYRIZI is approved, then hold.  - Start Westcort cream BID x 2 weeks to the face, then weekends only x 2 weeks. Repeat if recurring.  - Patient need colonoscopy prior to SKYRIZI start.  - Start SKYRIZI injections. Hold if ill.   - Labs reviewed: HIV, Hep B, Hep C, CBC, MMR, CXR    - Reviewed side effects including immune suppression and susceptibility to infection, headache, fatigue, discomfort at injection site. Reviewed needed vaccinations.    - Follow up in 3 months.     \"SKYRIZI  dosing schedule: The recommended dosage is 150 mg administered by subcutaneous injection at Week 0, Week 4, and then every 12 weeks thereafter.\"      # Elevated WBC " "count -keeping this section in record for safety  - \"Seen by oncology, discharge, recommended yearly CBC\"  -follows with heme onc       Procedures Performed:   None.    Follow-up: 3 month(s) in-person for follow up, or earlier for new or changing lesions    Staff and Scribe:     Scribe Disclosure:   I, Shivam English, am serving as a scribe to document services personally performed by this physician, Dr. Jacey Cervantes, based on data collection and the provider's statements to me.       Provider Disclosure:   The documentation recorded by the scribe accurately reflects the services I personally performed and the decisions made by me.    Jacey Cervantes MD    Department of Dermatology  [facility]: Phone: [phone], Fax:[phone]  [facility]: Phone: [phone], Fax: [phone]    ____________________________________________    CC: Skin Check (Discuss starting SKYRIZI, Stellara and Otezla did not help psoriasis. )    HPI:  Mr. To White is a(n) 52 year old male who presents today as a return patient for a skin check.     Last seen virtually 10/17/22 for plaque psoriasis. At that time, patient was instructed to continue clobetasol cream twice daily for up to 2 weeks, and continue Otezla.     Today, he notes that Stelara and Otezla have not been improving his psoriasis.     Patient is otherwise feeling well, without additional skin concerns.    Labs Reviewed:  N/A    Physical Exam:  Vitals: There were no vitals taken for this visit.  SKIN: Total skin excluding the undergarment areas was performed. The exam included the head/face, neck, both arms, chest, back, abdomen, both legs, digits and/or nails. Declines genital exam  - Erythematous plaques on the forehead and scalp, scattered diffusely across the back.   - There are erythematous well demarcated plaques with silvery micaceous scale on " the trunk, shins.    - No other lesions of concern on areas examined.     Medications:  Current Outpatient Medications   Medication     amLODIPine (NORVASC) 10 MG tablet     apremilast (OTEZLA) 30 MG tablet     benazepril (LOTENSIN) 40 MG tablet     calcipotriene 0.005 % OINT     clobetasol (TEMOVATE) 0.05 % external ointment     clobetasol (TEMOVATE) 0.05 % external solution     desonide (DESOWEN) 0.05 % external ointment     Fexofenadine HCl (ALLEGRA PO)     hydrOXYzine (ATARAX) 25 MG tablet     sildenafil (VIAGRA) 25 MG tablet     No current facility-administered medications for this visit.      Past Medical History:   Patient Active Problem List   Diagnosis     Other psoriasis     Obesity     Essential hypertension with goal blood pressure less than 140/90     Obesity, Class I, BMI 30-34.9     Benign cyst of both testicles     Morbid obesity (H)     Past Medical History:   Diagnosis Date     Cholecystitis      Essential hypertension, benign 01/01/2000     Hypertension      Obesity      Other psoriasis     since childhood     Tubular adenoma         CC No referring provider defined for this encounter. on close of this encounter.       Again, thank you for allowing me to participate in the care of your patient.        Sincerely,        Jacey Cervantes MD

## 2022-11-21 ENCOUNTER — TELEPHONE (OUTPATIENT)
Dept: DERMATOLOGY | Facility: CLINIC | Age: 52
End: 2022-11-21

## 2022-11-21 ENCOUNTER — MYC MEDICAL ADVICE (OUTPATIENT)
Dept: DERMATOLOGY | Facility: CLINIC | Age: 52
End: 2022-11-21

## 2022-11-21 ENCOUNTER — MYC MEDICAL ADVICE (OUTPATIENT)
Dept: FAMILY MEDICINE | Facility: CLINIC | Age: 52
End: 2022-11-21

## 2022-11-21 DIAGNOSIS — Z12.11 COLON CANCER SCREENING: Primary | ICD-10-CM

## 2022-11-21 NOTE — TELEPHONE ENCOUNTER
PA Initiation    Medication: skyrizi  Insurance Company: [employer] ([facility]) - Phone [phone] Fax [phone]  Pharmacy Filling the Rx: [facility] - [place], [place] - [address]  Filling Pharmacy Phone:    Filling Pharmacy Fax:    Start Date: 11/21/2022    Key: [id number]

## 2022-11-21 NOTE — TELEPHONE ENCOUNTER
Prior Authorization Approval    Authorization Effective Date: 11/21/2022  Authorization Expiration Date: 11/21/2023  Medication: skyrizi  Approved Dose/Quantity: 1 for 28 quesada  Reference #: Key: [id number]   Insurance Company: [employer] ([facility]) - Phone [phone] Fax [phone]  Expected CoPay: n/a     CoPay Card Available: Yes    Foundation Assistance Needed:    Which Pharmacy is filling the prescription (Not needed for infusion/clinic administered): [facility] - [address]  Pharmacy Notified: Yes  Patient Notified: Yes

## 2022-11-30 NOTE — TELEPHONE ENCOUNTER
Primary care because the result will go to the ordering physician and it best interpreted by them.

## 2022-12-01 ENCOUNTER — MYC MEDICAL ADVICE (OUTPATIENT)
Dept: FAMILY MEDICINE | Facility: CLINIC | Age: 52
End: 2022-12-01

## 2022-12-09 ENCOUNTER — TELEPHONE (OUTPATIENT)
Dept: GASTROENTEROLOGY | Facility: CLINIC | Age: 52
End: 2022-12-09

## 2022-12-09 NOTE — TELEPHONE ENCOUNTER
Screening Questions  BLUE  KIND OF PREP RED  LOCATION [review exclusion criteria] GREEN  SEDATION TYPE        Y Are you active on mychart?      Christo Richard MD   Ordering/Referring Provider?        [employer] What type of coverage do you have?      Y 11/30  Have you had a positive covid test in the last 14 days?     35.2 1. BMI  [BMI 40+ - review exclusion criteria]    Y  2. Are you able to give consent for your medical care? [IF NO,RN REVIEW]        N  3. Are you taking any prescription pain medications on a routine schedule?      NA  3a. EXTENDED PREP What kind of prescription?     N 4. Do you have any chemical dependencies such as alcohol, street drugs, or methadone?    N 5. Do you have any history of post-traumatic stress syndrome, severe anxiety or history of psychosis?      **If yes 3- 5 , please schedule with MAC sedation.**          IF YES TO ANY 6 - 10 - HOSPITAL SETTING ONLY.     N 6.   Do you need assistance transferring?     N 7.   Have you had a heart or lung transplant?    N 8.   Are you currently on dialysis?   N 9.   Do you use daily home oxygen?   N 10. Do you take nitroglycerin?   10a. NA If yes, how often?     11. [FEMALES]  NA Are you currently pregnant?    11a. NA If yes, how many weeks? [ Greater than 12 weeks, OR NEEDED]    N 12. Do you have Pulmonary Hypertension? *NEED PAC APPT AT [facility]*     N 13. [review exclusion criteria]  Do you have any implantable devices in your body (pacemaker, defib, LVAD)?    N 14. In the past 6 months, have you had any heart related issues including cardiomyopathy or heart attack?     14a. NA If yes, did it require cardiac stenting if so when?     N 15. Have you had a stroke or Transient ischemic attack (TIA - aka  mini stroke ) within 6 months?      N 16. Do you have mod to severe Obstructive Sleep Apnea?  [Hospital only - Ok at [place]]    N 17. Do you have SEVERE AND UNCONTROLLED asthma? *NEED PAC APPT AT [facility]*     N 18. Are you currently taking any  "blood thinners?     18a. If yes, inform patient to \"follow up w/ ordering provider for bridging instructions.\"    N 19. Do you take the medication Phentermine?    19a. If yes, \"Hold for 7 days before procedure.  Please consult your prescribing provider if you have questions about holding this medication.\"     N  20. Do you have chronic kidney disease?      N  21. Do you have a diagnosis of diabetes?     N  22. On a regular basis do you go 3-5 days between bowel movements?      23. Preferred LOCAL Pharmacy for Pre Prescription    [ LIST ONLY ONE PHARMACY]          [facility] - [place], [place] - [address]      - CLOSING REMINDERS -    Informed patient they will need an adult    Cannot take any type of public or medical transportation alone    Conscious Sedation- Needs  for 6 hours after the procedure       MAC/General-Needs  for 24 hours after procedure    Pre-Procedure Covid test to be completed [[facility] PCR Testing Required]    Confirmed Nurse will call to complete assessment       - SCHEDULING DETAILS -  N Hospital Setting Required? If yes, what is the exclusion?: MILAGRO JULIAN  Surgeon    02/17/23  Date of Procedure  Lower Endoscopy [Colonoscopy]  Type of Procedure Scheduled  [facility]   STANDARD GOLYTELY-If you answer yes to questions #8, #20, #21Which Colonoscopy Prep was Sent?     MAC Sedation Type     N PAC / Pre-op Required                 "

## 2023-01-05 ENCOUNTER — TELEPHONE (OUTPATIENT)
Dept: DERMATOLOGY | Facility: CLINIC | Age: 53
End: 2023-01-05
Payer: COMMERCIAL

## 2023-01-05 NOTE — TELEPHONE ENCOUNTER
Received another communication regarding a PA request for the Skyrizi. I notice the previous PA was good through November 2023, but I wonder if this was sent because of the new calendar year--do we need to create a separate encounter if it's for the same med, an/or do we need to go through another PA? Thank you!    Esvin Mccall  In Clinic Visit Facilitator

## 2023-01-05 NOTE — TELEPHONE ENCOUNTER
Patient has new insurance for 2023 and will require a new PA. Sent my chart message to see if they have started medication to determine if PA is need for starter or loading dose.

## 2023-01-05 NOTE — TELEPHONE ENCOUNTER
PA Initiation    Medication: Skyrizi  Insurance Company: [employer] - Phone [phone] Fax [phone]  Pharmacy Filling the Rx[facility] [place]  Filling Pharmacy Phone:    Filling Pharmacy Fax:    Start Date: 1/5/2023    Key: [id number]

## 2023-01-09 NOTE — TELEPHONE ENCOUNTER
PRIOR AUTHORIZATION DENIED    Medication: Skyrizi    Denial Date: 1/5/2023    Denial Rational: no documentation showing improvement on skyrizi    Appeal Information:

## 2023-01-13 NOTE — TELEPHONE ENCOUNTER
I have no letter to review? Where is the letter? Are they requesting a letter?    [facility]  Address:  [address] [place] [address]    PATIENT NAME: To White  DATE: January 12, 2023   MRN: [id number]    To Whom This May Concern,    We are writing to request coverage of skyrizi. You have notified us that it was denied because he is not improving. However, he is improving. He has sent us a message stating that he is improving with images of his trunk improved. Please approve this medication.   Jacey Cervantes MD    Department of Dermatology  [facility]: Phone: [phone], Fax:[phone]  [facility]: Phone: [phone], Fax: [phone]

## 2023-01-16 NOTE — TELEPHONE ENCOUNTER
Medication Appeal Initiation    We have initiated an appeal for the requested medication:  Medication: Skyrizi  Appeal Start Date:  1/16/2023  Insurance Company: [employer] - Phone [phone] Fax [phone]  Comments:  Faxed [phone]

## 2023-01-26 NOTE — TELEPHONE ENCOUNTER
"Received fax this morning 1/26/23 (dated for a delivery of 1/25/23 at 10:30am).    Appeal/coverage request was approved. Fax communication from [facility] states \"[facility] received a request for coverage or an exception to the coverage requirements of Skyrizi Pen 150MG/ML SC SOAJ for you.    As long as you remain covered by your prescription drug plan and there are no changes to your plan benefits, this request is approved for the following time period:     01/01/2023 - 01/25/2024\"    Just an FYI.  "

## 2023-02-03 NOTE — TELEPHONE ENCOUNTER
MEDICATION APPEAL APPROVED    Medication: Skyrizi  Authorization Effective Date: 1/1/2023  Authorization Expiration Date: 1/25/2024  Approved Dose/Quantity: 1 per 84 days  Reference #: Key: [id number]   Insurance Company: [employer] - Phone [phone] Fax [phone]  Expected CoPay:       CoPay Card Available:      Foundation Assistance Needed:    Which Pharmacy is filling the prescription (Not needed for infusion/clinic administered): [facility] [place]

## 2023-02-16 NOTE — H&P
[facility] History and Physical    To White MRN# [id number]   Age: 52 year old YOB: 1970     Date of Admission:  (Not on file)    Home clinic: [facility]  Primary care provider: Annel Yusuf          Impression and Plan:   Impression:   Colon cancer screening [Z12.11]  Last colonoscopy  polyp      Plan:   Proceed to Colonoscopy as planned.  The procedure, risks(bleeding, perforation), benefits and alternatives were discussed and the patient agrees to proceed. Cleared for Anesthesia             Chief Complaint:   Colon cancer screening [Z12.11]    History is obtained from the patient          History of Present Illness:   This 52 year old male is being seen at this time for evaluation for colonoscopy.  No complaints.  Maternal uncle with colon CA           Past Medical History:     Past Medical History:   Diagnosis Date     Cholecystitis      Essential hypertension, benign 2000     Hypertension      Obesity      Other psoriasis     since childhood     Tubular adenoma             Past Surgical History:     Past Surgical History:   Procedure Laterality Date     CHOLECYSTECTOMY       COLONOSCOPY       LAPAROSCOPIC CHOLECYSTECTOMY  2012    Procedure: LAPAROSCOPIC CHOLECYSTECTOMY;  Laparoscopic Cholecystectomy;  Surgeon: Kulwinder Hollins MD;  Location: [facility] OR     NO HISTORY OF SURGERY              Social History:     Social History     Tobacco Use     Smoking status: Never     Smokeless tobacco: Never   Substance Use Topics     Alcohol use: Yes     Comment: Rarely            Family History:     Family History   Problem Relation Age of Onset     Hypertension Father      Cerebrovascular Disease Maternal Grandmother      Cancer - colorectal Maternal Uncle         dx age 45,  before age 50     Gastrointestinal Disease Mother         benign colon polyps age over 50     Diabetes No family hx of      Prostate Cancer No family hx of              Immunizations:     VACCINE/DOSE   Diptheria   DPT   DTAP   HBIG   Hepatitis A   Hepatitis B   HIB   Influenza   Measles   Meningococcal   MMR   Mumps   Pneumococcal   Polio   Rubella   Small Pox   TDAP   Varicella   Zoster            Allergies:     Allergies   Allergen Reactions     No Known Allergies             Medications:     No current facility-administered medications for this encounter.     Current Outpatient Medications   Medication Sig     amLODIPine (NORVASC) 10 MG tablet Take 1 tablet (10 mg) by mouth daily     apremilast (OTEZLA) 30 MG tablet Take 1 tablet (30 mg) by mouth 2 times daily USE AFTER STARTER PARK IS DONE     benazepril (LOTENSIN) 40 MG tablet Take 1 tablet (40 mg) by mouth daily     bisacodyl (DULCOLAX) 5 MG EC tablet Take 2 tablets at 3 pm the day before your procedure. If your procedure is before 11 am, take 2 additional tablets at 11 pm. If your procedure is after 11 am, take 2 additional tablets at 6 am. For additional instructions refer to your colonoscopy prep instructions.     calcipotriene 0.005 % OINT Apply to the affected area twice a day Mon-Fri when rash is relatively calm.     clobetasol (TEMOVATE) 0.05 % external ointment Apply twice daily for 2 weeks to back.     clobetasol (TEMOVATE) 0.05 % external solution Apply on scalp for up to 2 weeks in a row for flares     desonide (DESOWEN) 0.05 % external ointment Apply to the affected area of the skin of groin or face daily for 2-3 week bursts as needed.     Fexofenadine HCl (ALLEGRA PO) Reported on 5/19/2017 (Patient not taking: Reported on 10/17/2022)     hydrocortisone (WESTCORT) 0.2 % external cream Apply twice daily for 2 weeks to the face, then weekends only for 2 weeks, repeat if psoriasis present     hydrOXYzine (ATARAX) 25 MG tablet TAKE 1-2 TABLETS BY MOUTH  EVERY 6 HOURS AS NEEDED FOR ITCHING     polyethylene glycol (GOLYTELY) 236 g suspension The night before the exam at 6 pm drink an 8-ounce glass every 15 minutes  until the jug is half empty. If you arrive before 11 AM: Drink the other half of the ivWatchly jug at 11 PM night before procedure. If you arrive after 11 AM: Drink the other half of the ivWatchly jug at 6 AM day of procedure. For additional instructions refer to your colonoscopy prep instructions.     Risankizumab-rzaa (SKYRIZI) 150 MG/ML subcutaneous Take 150 mg administered by subcutaneous injection at Week 0, Week 4, and then every 12 weeks thereafter.     sildenafil (VIAGRA) 25 MG tablet Take 1 tablet (25 mg) by mouth daily as needed (intercourse)             Review of Systems:   The review of systems was positive for the following findings.  None.  The remainder of the review of systems was unremarkable.          Physical Exam:   All vitals have been reviewed  There were no vitals taken for this visit.  No intake or output data in the 24 hours ending 02/16/23 0929  SHEENT examination revealed NC/AT, EOMI.  Examination of the chest revealed CTA.  Examination of the heart revealed RRR.  Examination of the abdomen revealed soft, nontender.  The neuromuscular examination was NL.          Data:   All laboratory data reviewed  No results found for any visits on 02/17/23.  -     Kulwinder Hollins MD, FACS

## 2023-02-17 ENCOUNTER — HOSPITAL ENCOUNTER (OUTPATIENT)
Facility: CLINIC | Age: 53
Discharge: HOME | End: 2023-02-17
Attending: SPECIALIST | Admitting: SPECIALIST
Payer: COMMERCIAL

## 2023-02-17 ENCOUNTER — ANESTHESIA EVENT (OUTPATIENT)
Dept: GASTROENTEROLOGY | Facility: CLINIC | Age: 53
End: 2023-02-17
Payer: COMMERCIAL

## 2023-02-17 ENCOUNTER — ANESTHESIA (OUTPATIENT)
Dept: GASTROENTEROLOGY | Facility: CLINIC | Age: 53
End: 2023-02-17
Payer: COMMERCIAL

## 2023-02-17 VITALS
TEMPERATURE: 97.9 F | DIASTOLIC BLOOD PRESSURE: 88 MMHG | RESPIRATION RATE: 18 BRPM | SYSTOLIC BLOOD PRESSURE: 112 MMHG | OXYGEN SATURATION: 98 % | HEART RATE: 95 BPM

## 2023-02-17 LAB — COLONOSCOPY: NORMAL

## 2023-02-17 PROCEDURE — 258N000003 HC RX IP 258 OP 636: Performed by: NURSE ANESTHETIST, CERTIFIED REGISTERED

## 2023-02-17 PROCEDURE — 88305 TISSUE EXAM BY PATHOLOGIST: CPT | Mod: TC | Performed by: SPECIALIST

## 2023-02-17 PROCEDURE — 370N000017 HC ANESTHESIA TECHNICAL FEE, PER MIN: Performed by: SPECIALIST

## 2023-02-17 PROCEDURE — 45385 COLONOSCOPY W/LESION REMOVAL: CPT | Mod: PT | Performed by: SPECIALIST

## 2023-02-17 PROCEDURE — 45380 COLONOSCOPY AND BIOPSY: CPT | Performed by: SPECIALIST

## 2023-02-17 PROCEDURE — 88305 TISSUE EXAM BY PATHOLOGIST: CPT | Mod: 26 | Performed by: PATHOLOGY

## 2023-02-17 PROCEDURE — 250N000011 HC RX IP 250 OP 636: Performed by: NURSE ANESTHETIST, CERTIFIED REGISTERED

## 2023-02-17 PROCEDURE — 250N000009 HC RX 250: Performed by: NURSE ANESTHETIST, CERTIFIED REGISTERED

## 2023-02-17 PROCEDURE — 45380 COLONOSCOPY AND BIOPSY: CPT | Mod: PT | Performed by: SPECIALIST

## 2023-02-17 PROCEDURE — 45385 COLONOSCOPY W/LESION REMOVAL: CPT | Mod: PT

## 2023-02-17 RX ORDER — SODIUM CHLORIDE, SODIUM LACTATE, POTASSIUM CHLORIDE, CALCIUM CHLORIDE 600; 310; 30; 20 MG/100ML; MG/100ML; MG/100ML; MG/100ML
INJECTION, SOLUTION INTRAVENOUS CONTINUOUS
Status: DISCONTINUED | OUTPATIENT
Start: 2023-02-17 | End: 2023-02-17 | Stop reason: HOSPADM

## 2023-02-17 RX ORDER — LIDOCAINE 40 MG/G
CREAM TOPICAL
Status: DISCONTINUED | OUTPATIENT
Start: 2023-02-17 | End: 2023-02-17 | Stop reason: HOSPADM

## 2023-02-17 RX ORDER — LIDOCAINE HYDROCHLORIDE 20 MG/ML
INJECTION, SOLUTION INFILTRATION; PERINEURAL PRN
Status: DISCONTINUED | OUTPATIENT
Start: 2023-02-17 | End: 2023-02-17

## 2023-02-17 RX ORDER — PROPOFOL 10 MG/ML
INJECTION, EMULSION INTRAVENOUS CONTINUOUS PRN
Status: DISCONTINUED | OUTPATIENT
Start: 2023-02-17 | End: 2023-02-17

## 2023-02-17 RX ORDER — PROPOFOL 10 MG/ML
INJECTION, EMULSION INTRAVENOUS PRN
Status: DISCONTINUED | OUTPATIENT
Start: 2023-02-17 | End: 2023-02-17

## 2023-02-17 RX ADMIN — LIDOCAINE HYDROCHLORIDE 50 MG: 20 INJECTION, SOLUTION INFILTRATION; PERINEURAL at 12:18

## 2023-02-17 RX ADMIN — PROPOFOL 150 MCG/KG/MIN: 10 INJECTION, EMULSION INTRAVENOUS at 12:17

## 2023-02-17 RX ADMIN — SODIUM CHLORIDE, POTASSIUM CHLORIDE, SODIUM LACTATE AND CALCIUM CHLORIDE: 600; 310; 30; 20 INJECTION, SOLUTION INTRAVENOUS at 11:49

## 2023-02-17 RX ADMIN — PROPOFOL 80 MG: 10 INJECTION, EMULSION INTRAVENOUS at 12:19

## 2023-02-17 RX ADMIN — PROPOFOL 100 MG: 10 INJECTION, EMULSION INTRAVENOUS at 12:18

## 2023-02-17 ASSESSMENT — ACTIVITIES OF DAILY LIVING (ADL): ADLS_ACUITY_SCORE: 35

## 2023-02-17 NOTE — DISCHARGE INSTRUCTIONS
[facility]    Home Care Following Endoscopy          Activity:  You have just undergone an endoscopic procedure usually performed with conscious sedation.  Do not work or operate machinery (including a car) for at least 12 hours.    I encourage you to walk and attempt to pass this air as soon as possible.    Diet:  Return to the diet you were on before your procedure but eat lightly for the first 12-24 hours.  Drink plenty of water.  Resume any regular medications unless otherwise advised by your physician.  Please begin any new medication prescribed as a result of your procedure as directed by your physician.   If you had any biopsy or polyp removed please refrain from aspirin or aspirin products for 2 days.  physician.   Pain:  You may take Tylenol as needed for pain.  Expected Recovery:  You can expect some mild abdominal fullness and/or discomfort due to the air used to inflate your intestinal tract.     Call Your Physician if You Have:    After Colonoscopy:  Worsening persisting abdominal pain which is worse with activity.  Fevers (>101 degrees F), chills or shakes.  Passage of continued blood with bowel movements.   Any questions or concerns about your recovery, please call [phone] or after hours [phone] Nurse Advice Line.    Follow-up Care:  You did have polyps/biopsy tissue sample(s) removed.  The polyps/biopsy tissue sample(s) will be sent to pathology.  You should receive letter in your My Chart from *** with your results within 1-2 weeks. If you do not participate in My Chart a physical letter will come in the mail in 2-3 weeks.  Please call if you have not received a notification of your results.  If asked to return to clinic please make an appointment 1 week after your procedure.  Call [phone].

## 2023-02-17 NOTE — ANESTHESIA CARE TRANSFER NOTE
Patient: To White    Procedure: Procedure(s):  COLONOSCOPY, WITH POLYPECTOMIES by snare and forcep       Diagnosis: Colon cancer screening [Z12.11]  Diagnosis Additional Information: No value filed.    Anesthesia Type:   MAC     Note:    Oropharynx: oropharynx clear of all foreign objects and spontaneously breathing  Level of Consciousness: drowsy  Oxygen Supplementation: face mask    Independent Airway: airway patency satisfactory and stable  Dentition: dentition unchanged  Vital Signs Stable: post-procedure vital signs reviewed and stable  Report to RN Given: handoff report given  Patient transferred to: Phase II    Handoff Report: Identifed the Patient, Identified the Reponsible Provider, Reviewed the pertinent medical history, Discussed the surgical course, Reviewed Intra-OP anesthesia mangement and issues during anesthesia, Set expectations for post-procedure period and Allowed opportunity for questions and acknowledgement of understanding      Vitals:  Vitals Value Taken Time   BP     Temp     Pulse     Resp     SpO2         Electronically Signed By: LISBETH Crowell CRNA  February 17, 2023  12:39 PM

## 2023-02-17 NOTE — ANESTHESIA PREPROCEDURE EVALUATION
Anesthesia Pre-Procedure Evaluation    Patient: To White   MRN: [id number] : 1970        Procedure : Procedure(s):  COLONOSCOPY          Past Medical History:   Diagnosis Date     Cholecystitis      Essential hypertension, benign 2000     Hypertension      Obesity      Other psoriasis     since childhood     Tubular adenoma       Past Surgical History:   Procedure Laterality Date     CHOLECYSTECTOMY       COLONOSCOPY       LAPAROSCOPIC CHOLECYSTECTOMY  2012    Procedure: LAPAROSCOPIC CHOLECYSTECTOMY;  Laparoscopic Cholecystectomy;  Surgeon: Kulwinder Hollins MD;  Location: [facility] OR     NO HISTORY OF SURGERY        Allergies   Allergen Reactions     No Known Allergies       Social History     Tobacco Use     Smoking status: Never     Smokeless tobacco: Never   Substance Use Topics     Alcohol use: Yes     Comment: Rarely      Wt Readings from Last 1 Encounters:   07/15/22 124.3 kg (274 lb)        Anesthesia Evaluation   Pt has had prior anesthetic. Type: General.        ROS/MED HX  ENT/Pulmonary:       Neurologic:  - neg neurologic ROS     Cardiovascular:     (+) Dyslipidemia hypertension-----    METS/Exercise Tolerance:     Hematologic:  - neg hematologic  ROS     Musculoskeletal:  - neg musculoskeletal ROS     GI/Hepatic:     (+) bowel prep, cholecystitis/cholelithiasis,     Renal/Genitourinary:       Endo:     (+) Obesity,     Psychiatric/Substance Use:  - neg psychiatric ROS     Infectious Disease:  - neg infectious disease ROS     Malignancy:  - neg malignancy ROS     Other:            Physical Exam    Airway        Mallampati: II   TM distance: > 3 FB   Neck ROM: full   Mouth opening: > 3 cm    Respiratory Devices and Support         Dental           Cardiovascular   cardiovascular exam normal          Pulmonary   pulmonary exam normal                OUTSIDE LABS:  CBC:   Lab Results   Component Value Date    WBC 10.5 10/19/2022    WBC 10.5 2022    WBC 10.2 2022    HGB 14.3  10/19/2022    HGB 14.0 01/11/2022    HGB 14.3 01/11/2022    HCT 42.3 10/19/2022    HCT 42.7 01/11/2022    HCT 42.9 01/11/2022     10/19/2022     01/11/2022     01/11/2022     BMP:   Lab Results   Component Value Date     10/19/2022     07/15/2022    POTASSIUM 3.6 10/19/2022    POTASSIUM 4.1 07/15/2022    CHLORIDE 107 10/19/2022    CHLORIDE 106 07/15/2022    CO2 25 10/19/2022    CO2 29 07/15/2022    BUN 9 10/19/2022    BUN 11 07/15/2022    CR 0.93 10/19/2022    CR 1.25 07/15/2022     (H) 10/19/2022    GLC 97 07/15/2022     COAGS: No results found for: PTT, INR, FIBR  POC: No results found for: BGM, HCG, HCGS  HEPATIC:   Lab Results   Component Value Date    ALBUMIN 3.8 10/19/2022    PROTTOTAL 8.0 10/19/2022    ALT 22 10/19/2022    AST 16 10/19/2022    ALKPHOS 94 10/19/2022    BILITOTAL 0.3 10/19/2022     OTHER:   Lab Results   Component Value Date    PH 6.5 12/19/2002    ANTIONETTE 8.9 10/19/2022    LIPASE 76 05/31/2012    AMYLASE 96 05/31/2012    TSH 2.28 02/24/2021       Anesthesia Plan    ASA Status:  2   NPO Status:  NPO Appropriate    Anesthesia Type: MAC.     - Reason for MAC: straight local not clinically adequate   Induction: Intravenous, Propofol.   Maintenance: TIVA.        Consents    Anesthesia Plan(s) and associated risks, benefits, and realistic alternatives discussed. Questions answered and patient/representative(s) expressed understanding.     - Discussed: Risks, Benefits and Alternatives for BOTH SEDATION and the PROCEDURE were discussed     - Discussed with:  Patient      - Extended Intubation/Ventilatory Support Discussed: No.      - Patient is DNR/DNI Status: No    Use of blood products discussed: No .     Postoperative Care            Comments:    Other Comments: The risks and benefits of anesthesia, and the alternatives where applicable, have been discussed with the patient, and they wish to proceed.            Sánchez Pickard, APRN CRNA

## 2023-02-17 NOTE — ANESTHESIA POSTPROCEDURE EVALUATION
Patient: To White    Procedure: Procedure(s):  COLONOSCOPY, WITH POLYPECTOMIES by snare and forcep       Anesthesia Type:  MAC    Note:  Disposition: Outpatient   Postop Pain Control: Uneventful            Sign Out: Well controlled pain   PONV: No   Neuro/Psych: Uneventful            Sign Out: Acceptable/Baseline neuro status   Airway/Respiratory: Uneventful            Sign Out: Acceptable/Baseline resp. status   CV/Hemodynamics: Uneventful            Sign Out: Acceptable CV status   Other NRE: NONE   DID A NON-ROUTINE EVENT OCCUR? No    Event details/Postop Comments:  Pt was happy with anesthesia care.  No complications.  I will follow up with the pt if needed.           Last vitals:  Vitals Value Taken Time   /80 02/17/23 1254   Temp     Pulse 85 02/17/23 1254   Resp 17 02/17/23 1254   SpO2 96 % 02/17/23 1258   Vitals shown include unvalidated device data.    Electronically Signed By: LISBETH Crowell CRNA  February 17, 2023  12:59 PM

## 2023-02-21 LAB
PATH REPORT.COMMENTS IMP SPEC: NORMAL
PATH REPORT.COMMENTS IMP SPEC: NORMAL
PATH REPORT.FINAL DX SPEC: NORMAL
PATH REPORT.GROSS SPEC: NORMAL
PATH REPORT.MICROSCOPIC SPEC OTHER STN: NORMAL
PATH REPORT.RELEVANT HX SPEC: NORMAL
PHOTO IMAGE: NORMAL

## 2023-02-23 ENCOUNTER — TELEPHONE (OUTPATIENT)
Dept: DERMATOLOGY | Facility: CLINIC | Age: 53
End: 2023-02-23
Payer: COMMERCIAL

## 2023-02-23 DIAGNOSIS — L40.9 PSORIASIS: Primary | ICD-10-CM

## 2023-02-23 NOTE — TELEPHONE ENCOUNTER
Medication: Skyrizi  Request from: fax (fax, call, my chart, other)     Pharmacy: [facility]

## 2023-02-23 NOTE — TELEPHONE ENCOUNTER
Risankizumab-rzaa (SKYRIZI) 150 MG/ML subcutaneous      Take 150 mg administered by subcutaneous injection at Week 0, Week 4, and then every 12 weeks thereafter.  Last Written Prescription Date:  12-15-22  Last Fill Quantity: 2 ml,   # refills: 1  Last Office Visit : 10-17-22 ( Dr. Cervantes)   Future Office visit:  2-27-23  REFILL DENIED  LAST RX 12-15-22 FOR 2 ML W/1 RF  Duplicate:RF @ 2-27-23 apppt

## 2023-02-27 ENCOUNTER — VIRTUAL VISIT (OUTPATIENT)
Dept: DERMATOLOGY | Facility: CLINIC | Age: 53
End: 2023-02-27
Payer: COMMERCIAL

## 2023-02-27 DIAGNOSIS — L40.9 PSORIASIS: ICD-10-CM

## 2023-02-27 PROCEDURE — 99214 OFFICE O/P EST MOD 30 MIN: CPT | Mod: 95 | Performed by: DERMATOLOGY

## 2023-02-27 NOTE — PATIENT INSTRUCTIONS
[facility] Dermatology Visit    Thank you for allowing us to participate in your care. Your findings, instructions and follow-up plan are as follows:         When should I call my doctor?  If you are worsening or not improving, please, contact us or seek urgent care as noted below.     Who should I call with questions (adults)?  [facility] (adult and pediatric): [phone]  Health system (adult): [phone]  For urgent needs outside of business hours call the [facility] at [phone] and ask for the dermatology resident on call  If this is a medical emergency and you are unable to reach an ER, Call 911    Who should I call with questions (pediatric)?  [facility]- Pediatric Dermatology  Dr. Torri Zimmerman, Dr. Geoff Sifuentes, Dr. Shruthi Ghotra, Ivelisse Hamilton, PA  Dr. Divine Florence, Dr. Aimee Gunn & Dr. Jeremias Gaston  Non Urgent  Nurse Triage Line; [phone]- Kasie and Purvi RN Care Coordinators   Krysta (/Complex ) [phone]    If you need a prescription refill, please contact your pharmacy. Refills are approved or denied by our physicians during normal business hours, Monday through Fridays  Per office policy, refills will not be granted if you have not been seen within the past year (or sooner depending on your child's condition).    Scheduling Information:  Pediatric Appointment Scheduling and Call Center [phone]  Radiology Scheduling- [phone]  Sedation Unit Scheduling- [phone]  [place] Scheduling- General [phone]; Pediatric Dermatology [phone]  Main  Services: [phone]  Russian: [phone]  Iranian: [phone]  Hmong/Roldan/Citizen of [place]: [phone]  Preadmission Nursing Department Fax Number: [phone] (fax all pre-operative paperwork to this number)    For urgent matters arising during evenings, weekends, or  holidays that cannot wait for normal business hours please call [phone] and ask for the dermatology resident on call to be paged.

## 2023-02-27 NOTE — LETTER
"    2/27/2023         RE: To White  [address] [address] [place] [id number]        Dear Colleague,    Thank you for referring your patient, To White, to the [facility]. Please see a copy of my visit note below.    [facility] Dermatology Note  Encounter Date: Feb 27, 2023  Store-and-Forward and Telephone ([phone]). Location of teledermatologist: [facility].  Start time: 12:44pm. End time: 12:57pm.    Dermatology Problem List:  1. Plaque psoriasis: 1 Skyrizi Otezla  triamcinolone 0.1% ointment, desonide 0.05% ointment, calcipotriene 0.005% ointment, Clobetasol  - Past tx: Soriatane 25 mg daily stopped due to pyogenic granulomas 6/2018, Marci Gan(had elevated WBC and saw heme), otezla didn't work   2. Onycholysis of distal right toenails, May be in association with psoriasis.   3. Seborrheic Dermatitis  - Current Tx: clobetasol, ketoconazole 2% shampoo  ____________________________________________     Assessment & Plan:      # Plaque psoriasis - On skyrizi and feels 50% better   - declines pneumonia, flu and covid vaccine  - Restart clobetasol cream BID twice daily for up to 2 weeks.   - Wesestcort cream BID x 2 weeks to the face, for 2 weeks  - No change for skyrizi   -hold drug for illness                # Elevated WBC count -keeping this section in record for safety -- this was kept in the chart for safety  - \"Seen by oncology, discharge, recommended yearly CBC\"  -follows with heme onc    Procedures Performed:    None    Follow-up: 6 months  Photos and phone and 1 year in person    Staff:     Jacey Cervantes MD    Department of Dermatology  [facility]: Phone: [phone], Fax:[phone]  [facility]: Phone: [phone], Fax: [phone]        ____________________________________________    CC: " Follow Up    HPI:  Mr. To White is a(n) 52 year old male who presents today as a return patient for psoriasis. Has had colonsocopy.     Feels he has had initial 2 shots and then booster.       Is better overall.  Denies Upper respiratory infections, Headache, Fatigue,   Injection site reactions, tinea. Denies hospitalizations    Feels 50% percent better.     Taking for 5 months   Patient is otherwise feeling well, without additional skin concerns.    Labs Reviewed:  Reviewed last colonscopy    Physical Exam:  Vitals: There were no vitals taken for this visit.  SKIN: Teledermatology photos were reviewed; image quality and interpretability:  acceptable. Image date: within last 4 days  -scaly plaques upper back, hyperpigmentation  - No other lesions of concern on areas examined.     Medications:  Current Outpatient Medications   Medication     amLODIPine (NORVASC) 10 MG tablet     benazepril (LOTENSIN) 40 MG tablet     calcipotriene 0.005 % OINT     clobetasol (TEMOVATE) 0.05 % external ointment     clobetasol (TEMOVATE) 0.05 % external solution     desonide (DESOWEN) 0.05 % external ointment     hydrocortisone (WESTCORT) 0.2 % external cream     hydrOXYzine (ATARAX) 25 MG tablet     Risankizumab-rzaa (SKYRIZI) 150 MG/ML subcutaneous     sildenafil (VIAGRA) 25 MG tablet     Fexofenadine HCl (ALLEGRA PO)     No current facility-administered medications for this visit.      Past Medical/Surgical History:   Patient Active Problem List   Diagnosis     Other psoriasis     Obesity     Essential hypertension with goal blood pressure less than 140/90     Obesity, Class I, BMI 30-34.9     Benign cyst of both testicles     Morbid obesity (H)     Past Medical History:   Diagnosis Date     Cholecystitis      Essential hypertension, benign 01/01/2000     Hypertension      Obesity      Other psoriasis     since childhood     Tubular adenoma        CC No referring provider defined for this encounter. on close of this  encounter.        Again, thank you for allowing me to participate in the care of your patient.        Sincerely,        Jacey Cervantes MD

## 2023-02-27 NOTE — PROGRESS NOTES
"[facility] Dermatology Note  Encounter Date: Feb 27, 2023  Store-and-Forward and Telephone ([phone]). Location of teledermatologist: [facility].  Start time: 12:44pm. End time: 12:57pm.    Dermatology Problem List:  1. Plaque psoriasis: 1 Skyrizi Otezla  triamcinolone 0.1% ointment, desonide 0.05% ointment, calcipotriene 0.005% ointment, Clobetasol  - Past tx: Soriatane 25 mg daily stopped due to pyogenic granulomas 6/2018, Marci Gan(had elevated WBC and saw heme), otezla didn't work   2. Onycholysis of distal right toenails, May be in association with psoriasis.   3. Seborrheic Dermatitis  - Current Tx: clobetasol, ketoconazole 2% shampoo  ____________________________________________     Assessment & Plan:      # Plaque psoriasis - On skyrizi and feels 50% better   - declines pneumonia, flu and covid vaccine  - Restart clobetasol cream BID twice daily for up to 2 weeks.   - Wesestcort cream BID x 2 weeks to the face, for 2 weeks  - No change for skyrizi   -hold drug for illness                # Elevated WBC count -keeping this section in record for safety -- this was kept in the chart for safety  - \"Seen by oncology, discharge, recommended yearly CBC\"  -follows with heme onc    Procedures Performed:    None    Follow-up: 6 months  Photos and phone and 1 year in person    Staff:     Jacey Cervantes MD    Department of Dermatology  [facility]: Phone: [phone], Fax:[phone]  [facility]: Phone: [phone], Fax: [phone]        ____________________________________________    CC: Follow Up    HPI:  Mr. To White is a(n) 52 year old male who presents today as a return patient for psoriasis. Has had colonsocopy.     Feels he has had initial 2 shots and then booster.       Is better overall.  Denies Upper respiratory " infections, Headache, Fatigue,   Injection site reactions, tinea. Denies hospitalizations    Feels 50% percent better.     Taking for 5 months   Patient is otherwise feeling well, without additional skin concerns.    Labs Reviewed:  Reviewed last colonscopy    Physical Exam:  Vitals: There were no vitals taken for this visit.  SKIN: Teledermatology photos were reviewed; image quality and interpretability:  acceptable. Image date: within last 4 days  -scaly plaques upper back, hyperpigmentation  - No other lesions of concern on areas examined.     Medications:  Current Outpatient Medications   Medication     amLODIPine (NORVASC) 10 MG tablet     benazepril (LOTENSIN) 40 MG tablet     calcipotriene 0.005 % OINT     clobetasol (TEMOVATE) 0.05 % external ointment     clobetasol (TEMOVATE) 0.05 % external solution     desonide (DESOWEN) 0.05 % external ointment     hydrocortisone (WESTCORT) 0.2 % external cream     hydrOXYzine (ATARAX) 25 MG tablet     Risankizumab-rzaa (SKYRIZI) 150 MG/ML subcutaneous     sildenafil (VIAGRA) 25 MG tablet     Fexofenadine HCl (ALLEGRA PO)     No current facility-administered medications for this visit.      Past Medical/Surgical History:   Patient Active Problem List   Diagnosis     Other psoriasis     Obesity     Essential hypertension with goal blood pressure less than 140/90     Obesity, Class I, BMI 30-34.9     Benign cyst of both testicles     Morbid obesity (H)     Past Medical History:   Diagnosis Date     Cholecystitis      Essential hypertension, benign 01/01/2000     Hypertension      Obesity      Other psoriasis     since childhood     Tubular adenoma        CC No referring provider defined for this encounter. on close of this encounter.

## 2023-02-27 NOTE — NURSING NOTE
Chief Complaint   Patient presents with     Follow Up     Teledermatology Nurse Call Patients:     Are you in the [facility] at the time of the encounter? yes    Today's visit will be billed to you and your insurance.    A teledermatology visit is not as thorough as an in-person visit and the quality of the photograph sent may not be of the same quality as that taken by the dermatology clinic.    Anat Espinosa, CMA

## 2023-02-28 NOTE — TELEPHONE ENCOUNTER
Pharmacy sent fax: they are needing a new order due to prior auth being approved for 2 75mg pfs every 84 days and then 1 150mg pfs every 84 days. Will route to provider to reorder medication.

## 2023-03-07 NOTE — TELEPHONE ENCOUNTER
Fax received from pharmacy- the prescription sent has been discontinued. They currently have available Skyrizi 150mg/ml.

## 2023-06-15 ENCOUNTER — PATIENT OUTREACH (OUTPATIENT)
Dept: CARE COORDINATION | Facility: CLINIC | Age: 53
End: 2023-06-15
Payer: COMMERCIAL

## 2023-06-29 NOTE — PROGRESS NOTES
[facility] Dermatology Note  Encounter Date: Jun 30, 2023  Office Visit     Dermatology Problem List:  Last skin check: 6/30/23  1. Plaque psoriasis: 1 Skyrizi Otezla  triamcinolone 0.1% ointment, desonide 0.05% ointment, calcipotriene 0.005% ointment, Clobetasol  - Past tx: Soriatane 25 mg daily stopped due to pyogenic granulomas 6/2018, Marci Gan(had elevated WBC and saw heme), otezla didn't work   2. Onycholysis of distal right toenails, May be in association with psoriasis.   3. Seborrheic Dermatitis  - Current Tx: clobetasol, ketoconazole 2% shampoo  ____________________________________________     Assessment & Plan:      # Plaque psoriasis - Pt reports Skyrizi has improved psoriasis. Recommended to update covid vaccination. Faint 1 patch on leg >5% TBSA today  - Continue clobetasol cream BID twice daily for up to 2 weeks.   - Continue Wesestcort cream BID x 2 weeks to the face, for upt o 2 weeks for face  -Continue Skyrizi   -Hold drug for illness     -Labs ordered: Quant gold, CBC      #Hx of elevated WBC-recheck today    Procedures Performed:   None.     Follow-up: 1 year(s) in-person, or earlier for new or changing lesions    Staff and Scribe:     Scribe Disclosure:   I, Spring Roberson, am serving as a scribe to document services personally performed by this physician, Dr. Jacey Cervantes, based on data collection and the provider's statements to me.         Provider Disclosure:   The documentation recorded by the scribe accurately reflects the services I personally performed and the decisions made by me.    Jacey Cervantes MD    Department of Dermatology  [facility]: Phone: [phone], Fax:[phone]  [facility]: Phone: [phone], Fax: [phone]    ____________________________________________    CC: Psoriasis (Patient in clinic for psoriasis follow  up.  Patient states symptoms are stable but feels Stelara might have worked better. )    HPI:  Mr. To White is a(n) 53 year old male who presents today as a return patient for psoriasis follow up. Pt reports Skdebii is helping more than oral medication. Pt reports psoriasis is currently stable. Pt reports there is occasional flares.     Last seen 2/27/23 for a follow up. At that time pt was restarted on clobetasol cream BID for 2 weeks. Pt was also started on Westcort cream BID for 2 weeks.     Patient is otherwise feeling well, without additional skin concerns.    Labs Reviewed:  Quant gold:     Physical Exam:  Vitals: There were no vitals taken for this visit.  SKIN: Total skin was performed and pt had shorts on. The exam included the head/face, neck, both arms, chest, back, abdomen, both lower legs, digits and/or nails.   -Pt wearing shorts  -faint macular erythematous patch on the left lower leg of erytehma   - No other lesions of concern on areas examined.     Medications:  Current Outpatient Medications   Medication     amLODIPine (NORVASC) 10 MG tablet     benazepril (LOTENSIN) 40 MG tablet     calcipotriene 0.005 % OINT     clobetasol (TEMOVATE) 0.05 % external ointment     clobetasol (TEMOVATE) 0.05 % external solution     desonide (DESOWEN) 0.05 % external ointment     Fexofenadine HCl (ALLEGRA PO)     hydrocortisone (WESTCORT) 0.2 % external cream     hydrOXYzine (ATARAX) 25 MG tablet     Risankizumab-rzaa (SKYRIZI) 150 MG/ML subcutaneous     sildenafil (VIAGRA) 25 MG tablet     No current facility-administered medications for this visit.      Past Medical History:   Patient Active Problem List   Diagnosis     Other psoriasis     Obesity     Essential hypertension with goal blood pressure less than 140/90     Obesity, Class I, BMI 30-34.9     Benign cyst of both testicles     Morbid obesity (H)     Past Medical History:   Diagnosis Date     Cholecystitis      Essential hypertension, benign 01/01/2000      Hypertension      Obesity      Other psoriasis     since childhood     Tubular adenoma         CC No referring provider defined for this encounter. on close of this encounter.

## 2023-06-30 ENCOUNTER — OFFICE VISIT (OUTPATIENT)
Dept: DERMATOLOGY | Facility: CLINIC | Age: 53
End: 2023-06-30
Payer: COMMERCIAL

## 2023-06-30 DIAGNOSIS — L40.0 PLAQUE PSORIASIS: ICD-10-CM

## 2023-06-30 DIAGNOSIS — D72.829 LEUKOCYTOSIS, UNSPECIFIED TYPE: Primary | ICD-10-CM

## 2023-06-30 LAB
BASOPHILS # BLD AUTO: 0.1 10E3/UL (ref 0–0.2)
BASOPHILS NFR BLD AUTO: 1 %
EOSINOPHIL # BLD AUTO: 0.5 10E3/UL (ref 0–0.7)
EOSINOPHIL NFR BLD AUTO: 5 %
ERYTHROCYTE [DISTWIDTH] IN BLOOD BY AUTOMATED COUNT: 13.2 % (ref 10–15)
HCT VFR BLD AUTO: 44.7 % (ref 40–53)
HGB BLD-MCNC: 14.9 G/DL (ref 13.3–17.7)
IMM GRANULOCYTES # BLD: 0 10E3/UL
IMM GRANULOCYTES NFR BLD: 0 %
LYMPHOCYTES # BLD AUTO: 3.8 10E3/UL (ref 0.8–5.3)
LYMPHOCYTES NFR BLD AUTO: 39 %
MCH RBC QN AUTO: 29.7 PG (ref 26.5–33)
MCHC RBC AUTO-ENTMCNC: 33.3 G/DL (ref 31.5–36.5)
MCV RBC AUTO: 89 FL (ref 78–100)
MONOCYTES # BLD AUTO: 0.8 10E3/UL (ref 0–1.3)
MONOCYTES NFR BLD AUTO: 8 %
NEUTROPHILS # BLD AUTO: 4.6 10E3/UL (ref 1.6–8.3)
NEUTROPHILS NFR BLD AUTO: 47 %
NRBC # BLD AUTO: 0 10E3/UL
NRBC BLD AUTO-RTO: 0 /100
PLATELET # BLD AUTO: 358 10E3/UL (ref 150–450)
RBC # BLD AUTO: 5.02 10E6/UL (ref 4.4–5.9)
WBC # BLD AUTO: 9.8 10E3/UL (ref 4–11)

## 2023-06-30 PROCEDURE — 85025 COMPLETE CBC W/AUTO DIFF WBC: CPT | Performed by: DERMATOLOGY

## 2023-06-30 PROCEDURE — 86481 TB AG RESPONSE T-CELL SUSP: CPT | Performed by: DERMATOLOGY

## 2023-06-30 PROCEDURE — 99214 OFFICE O/P EST MOD 30 MIN: CPT | Performed by: DERMATOLOGY

## 2023-06-30 PROCEDURE — 36415 COLL VENOUS BLD VENIPUNCTURE: CPT | Performed by: DERMATOLOGY

## 2023-06-30 RX ORDER — HYDROCORTISONE VALERATE CREAM 2 MG/G
CREAM TOPICAL
Qty: 60 G | Refills: 2 | Status: SHIPPED | OUTPATIENT
Start: 2023-06-30 | End: 2024-06-28

## 2023-06-30 RX ORDER — DESONIDE 0.5 MG/G
OINTMENT TOPICAL
Qty: 60 G | Refills: 6 | Status: SHIPPED | OUTPATIENT
Start: 2023-06-30 | End: 2024-06-28

## 2023-06-30 RX ORDER — CLOBETASOL PROPIONATE 0.5 MG/G
OINTMENT TOPICAL
Qty: 60 G | Refills: 4 | Status: SHIPPED | OUTPATIENT
Start: 2023-06-30 | End: 2024-06-28

## 2023-06-30 RX ORDER — CLOBETASOL PROPIONATE 0.5 MG/ML
SOLUTION TOPICAL
Qty: 50 ML | Refills: 3 | Status: SHIPPED | OUTPATIENT
Start: 2023-06-30 | End: 2024-06-28

## 2023-06-30 ASSESSMENT — PAIN SCALES - GENERAL: PAINLEVEL: NO PAIN (0)

## 2023-06-30 NOTE — PATIENT INSTRUCTIONS
USE SKYRIZI is a prescription medicine used to treat adults with moderate to severe plaque psoriasis who may benefit from taking injections or pills (systemic therapy) or treatment using ultraviolet or UV light (phototherapy).    IMPORTANT SAFETY INFORMATION    What is the most important information I should know about SKYRIZI  (risankizumab-rzaa)?  SKYRIZI may cause serious side effects, including infections. SKYRIZI is a prescription medicine that may lower the ability of your immune system to fight infections and may increase your risk of infections. Your healthcare provider should check you for infections and tuberculosis (TB) before starting treatment with SKYRIZI and may treat you for TB before you begin treatment with SKYRIZI if you have a history of TB or have active TB. Your healthcare provider should watch you closely for signs and symptoms of TB during and after treatment with SKYRIZI.    Tell your healthcare provider right away if you have an infection or have symptoms of an infection, including:  fever, sweats, or chills  muscle aches  weight loss  cough  warm, red, or painful skin or sores on your body different from your psoriasis  diarrhea or stomach pain  shortness of breath  blood in your mucus (phlegm)  burning when you urinate or urinating more often than normal  Before using SKYRIZI, tell your healthcare provider about all of your medical conditions, including if you:  have any of the conditions or symptoms listed in the section  What is the most important information I should know about SKYRIZI?   have an infection that does not go away or that keeps coming back.  have TB or have been in close contact with someone with TB.  have recently received or are scheduled to receive an immunization (vaccine). Medications that interact with the immune system may increase your risk of getting an infection after receiving live vaccines. You should avoid receiving live vaccines right before, during, or  right after treatment with SKYRIZI. Tell your healthcare provider that you are taking SKYRIZI before receiving a vaccine.  are pregnant or plan to become pregnant. It is not known if SKYRIZI can harm your unborn baby.  are breastfeeding or plan to breastfeed. It is not known if SKYRIZI passes into your breast milk.  Tell your healthcare provider about all the medicines you take, including prescription and over-the-counter medicines, vitamins, and herbal supplements.    What are the possible side effects of SKYRIZI?  SKYRIZI may cause serious side effects. See  What is the most important information I should know about SKYRIZI?     The most common side effects of SKYRIZI include upper respiratory infections, feeling tired, fungal skin infections, headache, and injection site reactions.    These are not all the possible side effects of SKYRIZI. Call your doctor for medical advice about side effects.    Use SKYRIZI exactly as your healthcare provider tells you to use it.    SKYRIZI is available in a 150 mg/mL prefilled syringe and pen.    [id number]    Please see the Full Prescribing Information, including the Medication Guide, for SKYRIZI.    You are encouraged to report negative side effects of prescription drugs to the FDA. Visit www.fda.gov/medwatch or call 2-155-ONB-6277.  If you are having difficulty paying for your medicine, [employer] may be able to help. Visit [employer] to learn more.

## 2023-06-30 NOTE — NURSING NOTE
To White's goals for this visit include:   Chief Complaint   Patient presents with     Psoriasis     Patient in clinic for psoriasis follow up.  Patient states symptoms are stable but feels Stelara might have worked better.        He requests these members of his care team be copied on today's visit information:       PCP: Christo Richard    Referring Provider:  No referring provider defined for this encounter.    There were no vitals taken for this visit.    Do you need any medication refills at today's visit? yes    Loren Sims CMA on 6/30/2023 at 9:43 AM

## 2023-06-30 NOTE — LETTER
6/30/2023         RE: To White  [address] [facility] [address]        Dear Colleague,    Thank you for referring your patient, To White, to the [facility]. Please see a copy of my visit note below.    [facility] Dermatology Note  Encounter Date: Jun 30, 2023  Office Visit     Dermatology Problem List:  Last skin check: 6/30/23  1. Plaque psoriasis: 1 Skyrizi Otezla  triamcinolone 0.1% ointment, desonide 0.05% ointment, calcipotriene 0.005% ointment, Clobetasol  - Past tx: Soriatane 25 mg daily stopped due to pyogenic granulomas 6/2018, Marci Gan(had elevated WBC and saw heme), otezla didn't work   2. Onycholysis of distal right toenails, May be in association with psoriasis.   3. Seborrheic Dermatitis  - Current Tx: clobetasol, ketoconazole 2% shampoo  ____________________________________________     Assessment & Plan:      # Plaque psoriasis - Pt reports Skyrizi has improved psoriasis. Recommended to update covid vaccination. Faint 1 patch on leg >5% TBSA today  - Continue clobetasol cream BID twice daily for up to 2 weeks.   - Continue Wesestcort cream BID x 2 weeks to the face, for upt o 2 weeks for face  -Continue Skyrizi   -Hold drug for illness     -Labs ordered: Quant gold, CBC      #Hx of elevated WBC-recheck today    Procedures Performed:   None.     Follow-up: 1 year(s) in-person, or earlier for new or changing lesions    Staff and Scribe:     Scribe Disclosure:   I, Spring Roberson, am serving as a scribe to document services personally performed by this physician, Dr. Jacey Cervantes, based on data collection and the provider's statements to me.         Provider Disclosure:   The documentation recorded by the scribe accurately reflects the services I personally performed and the decisions made by me.    Jacey Cervantes MD    Department of Dermatology  [facility]  Clinics: Phone: [phone], Fax:[phone]  [facility]: Phone: [phone], Fax: [phone]    ____________________________________________    CC: Psoriasis (Patient in clinic for psoriasis follow up.  Patient states symptoms are stable but feels Stelara might have worked better. )    HPI:  Mr. To White is a(n) 53 year old male who presents today as a return patient for psoriasis follow up. Pt reports Skyrizi is helping more than oral medication. Pt reports psoriasis is currently stable. Pt reports there is occasional flares.     Last seen 2/27/23 for a follow up. At that time pt was restarted on clobetasol cream BID for 2 weeks. Pt was also started on Westcort cream BID for 2 weeks.     Patient is otherwise feeling well, without additional skin concerns.    Labs Reviewed:  Quant gold:     Physical Exam:  Vitals: There were no vitals taken for this visit.  SKIN: Total skin was performed and pt had shorts on. The exam included the head/face, neck, both arms, chest, back, abdomen, both lower legs, digits and/or nails.   -Pt wearing shorts  -faint macular erythematous patch on the left lower leg of priya   - No other lesions of concern on areas examined.     Medications:  Current Outpatient Medications   Medication     amLODIPine (NORVASC) 10 MG tablet     benazepril (LOTENSIN) 40 MG tablet     calcipotriene 0.005 % OINT     clobetasol (TEMOVATE) 0.05 % external ointment     clobetasol (TEMOVATE) 0.05 % external solution     desonide (DESOWEN) 0.05 % external ointment     Fexofenadine HCl (ALLEGRA PO)     hydrocortisone (WESTCORT) 0.2 % external cream     hydrOXYzine (ATARAX) 25 MG tablet     Risankizumab-rzaa (SKYRIZI) 150 MG/ML subcutaneous     sildenafil (VIAGRA) 25 MG tablet     No current facility-administered medications for this visit.      Past Medical History:   Patient Active Problem List   Diagnosis     Other psoriasis     Obesity     Essential  hypertension with goal blood pressure less than 140/90     Obesity, Class I, BMI 30-34.9     Benign cyst of both testicles     Morbid obesity (H)     Past Medical History:   Diagnosis Date     Cholecystitis      Essential hypertension, benign 01/01/2000     Hypertension      Obesity      Other psoriasis     since childhood     Tubular adenoma         CC No referring provider defined for this encounter. on close of this encounter.     Again, thank you for allowing me to participate in the care of your patient.        Sincerely,        Jacey Cervantes MD

## 2023-07-02 LAB
GAMMA INTERFERON BACKGROUND BLD IA-ACNC: 0.54 IU/ML
M TB IFN-G BLD-IMP: NEGATIVE
M TB IFN-G CD4+ BCKGRND COR BLD-ACNC: 9.46 IU/ML
MITOGEN IGNF BCKGRD COR BLD-ACNC: -0.22 IU/ML
MITOGEN IGNF BCKGRD COR BLD-ACNC: -0.49 IU/ML
QUANTIFERON MITOGEN: 10 IU/ML
QUANTIFERON NIL TUBE: 0.54 IU/ML
QUANTIFERON TB1 TUBE: 0.32 IU/ML
QUANTIFERON TB2 TUBE: 0.05

## 2023-08-04 ASSESSMENT — ENCOUNTER SYMPTOMS
SORE THROAT: 0
DIARRHEA: 0
FREQUENCY: 0
MYALGIAS: 0
DIZZINESS: 0
FEVER: 0
ARTHRALGIAS: 0
SHORTNESS OF BREATH: 0
HEMATOCHEZIA: 0
HEMATURIA: 0
DYSURIA: 0
CONSTIPATION: 0
NERVOUS/ANXIOUS: 0
WEAKNESS: 0
EYE PAIN: 0
PARESTHESIAS: 0
CHILLS: 0
NAUSEA: 0
COUGH: 0
ABDOMINAL PAIN: 0
JOINT SWELLING: 0
PALPITATIONS: 0
HEARTBURN: 0
HEADACHES: 0

## 2023-08-11 ENCOUNTER — OFFICE VISIT (OUTPATIENT)
Dept: FAMILY MEDICINE | Facility: CLINIC | Age: 53
End: 2023-08-11
Payer: COMMERCIAL

## 2023-08-11 VITALS
RESPIRATION RATE: 16 BRPM | TEMPERATURE: 97.2 F | WEIGHT: 269.2 LBS | HEIGHT: 74 IN | HEART RATE: 88 BPM | SYSTOLIC BLOOD PRESSURE: 122 MMHG | BODY MASS INDEX: 34.55 KG/M2 | DIASTOLIC BLOOD PRESSURE: 80 MMHG | OXYGEN SATURATION: 97 %

## 2023-08-11 DIAGNOSIS — Z12.5 SCREENING FOR PROSTATE CANCER: ICD-10-CM

## 2023-08-11 DIAGNOSIS — N52.9 ERECTILE DYSFUNCTION, UNSPECIFIED ERECTILE DYSFUNCTION TYPE: ICD-10-CM

## 2023-08-11 DIAGNOSIS — E66.09 CLASS 1 OBESITY DUE TO EXCESS CALORIES WITH SERIOUS COMORBIDITY AND BODY MASS INDEX (BMI) OF 34.0 TO 34.9 IN ADULT: ICD-10-CM

## 2023-08-11 DIAGNOSIS — Z00.00 ROUTINE GENERAL MEDICAL EXAMINATION AT A HEALTH CARE FACILITY: Primary | ICD-10-CM

## 2023-08-11 DIAGNOSIS — E66.811 CLASS 1 OBESITY DUE TO EXCESS CALORIES WITH SERIOUS COMORBIDITY AND BODY MASS INDEX (BMI) OF 34.0 TO 34.9 IN ADULT: ICD-10-CM

## 2023-08-11 DIAGNOSIS — I10 ESSENTIAL HYPERTENSION WITH GOAL BLOOD PRESSURE LESS THAN 140/90: ICD-10-CM

## 2023-08-11 DIAGNOSIS — L40.8 OTHER PSORIASIS: ICD-10-CM

## 2023-08-11 DIAGNOSIS — D36.9 TUBULAR ADENOMA: ICD-10-CM

## 2023-08-11 DIAGNOSIS — Z13.220 LIPID SCREENING: ICD-10-CM

## 2023-08-11 PROBLEM — E66.01 MORBID OBESITY (H): Status: RESOLVED | Noted: 2021-02-24 | Resolved: 2023-08-11

## 2023-08-11 LAB
ALBUMIN SERPL BCG-MCNC: 4.2 G/DL (ref 3.5–5.2)
ALP SERPL-CCNC: 108 U/L (ref 40–129)
ALT SERPL W P-5'-P-CCNC: 14 U/L (ref 0–70)
ANION GAP SERPL CALCULATED.3IONS-SCNC: 9 MMOL/L (ref 7–15)
AST SERPL W P-5'-P-CCNC: 17 U/L (ref 0–45)
BILIRUB SERPL-MCNC: 0.3 MG/DL
BUN SERPL-MCNC: 11 MG/DL (ref 6–20)
CALCIUM SERPL-MCNC: 9.4 MG/DL (ref 8.6–10)
CHLORIDE SERPL-SCNC: 102 MMOL/L (ref 98–107)
CHOLEST SERPL-MCNC: 187 MG/DL
CREAT SERPL-MCNC: 1.04 MG/DL (ref 0.67–1.17)
DEPRECATED HCO3 PLAS-SCNC: 26 MMOL/L (ref 22–29)
GFR SERPL CREATININE-BSD FRML MDRD: 86 ML/MIN/1.73M2
GLUCOSE SERPL-MCNC: 102 MG/DL (ref 70–99)
HDLC SERPL-MCNC: 44 MG/DL
LDLC SERPL CALC-MCNC: 117 MG/DL
NONHDLC SERPL-MCNC: 143 MG/DL
POTASSIUM SERPL-SCNC: 4.3 MMOL/L (ref 3.4–5.3)
PROT SERPL-MCNC: 7.3 G/DL (ref 6.4–8.3)
PSA SERPL DL<=0.01 NG/ML-MCNC: 0.42 NG/ML (ref 0–3.5)
SODIUM SERPL-SCNC: 137 MMOL/L (ref 136–145)
TRIGL SERPL-MCNC: 128 MG/DL

## 2023-08-11 PROCEDURE — 80061 LIPID PANEL: CPT | Performed by: STUDENT IN AN ORGANIZED HEALTH CARE EDUCATION/TRAINING PROGRAM

## 2023-08-11 PROCEDURE — 36415 COLL VENOUS BLD VENIPUNCTURE: CPT | Performed by: STUDENT IN AN ORGANIZED HEALTH CARE EDUCATION/TRAINING PROGRAM

## 2023-08-11 PROCEDURE — 99396 PREV VISIT EST AGE 40-64: CPT | Performed by: STUDENT IN AN ORGANIZED HEALTH CARE EDUCATION/TRAINING PROGRAM

## 2023-08-11 PROCEDURE — 80053 COMPREHEN METABOLIC PANEL: CPT | Performed by: STUDENT IN AN ORGANIZED HEALTH CARE EDUCATION/TRAINING PROGRAM

## 2023-08-11 PROCEDURE — G0103 PSA SCREENING: HCPCS | Performed by: STUDENT IN AN ORGANIZED HEALTH CARE EDUCATION/TRAINING PROGRAM

## 2023-08-11 RX ORDER — BENAZEPRIL HYDROCHLORIDE 40 MG/1
40 TABLET ORAL DAILY
Qty: 90 TABLET | Refills: 3 | Status: SHIPPED | OUTPATIENT
Start: 2023-08-11 | End: 2024-07-23

## 2023-08-11 RX ORDER — SILDENAFIL 25 MG/1
25 TABLET, FILM COATED ORAL DAILY PRN
Qty: 10 TABLET | Refills: 0 | Status: SHIPPED | OUTPATIENT
Start: 2023-08-11 | End: 2023-08-11

## 2023-08-11 RX ORDER — SILDENAFIL 25 MG/1
25 TABLET, FILM COATED ORAL DAILY PRN
Qty: 10 TABLET | Refills: 3 | Status: SHIPPED | OUTPATIENT
Start: 2023-08-11 | End: 2023-11-27

## 2023-08-11 RX ORDER — AMLODIPINE BESYLATE 10 MG/1
10 TABLET ORAL DAILY
Qty: 90 TABLET | Refills: 3 | Status: SHIPPED | OUTPATIENT
Start: 2023-08-11 | End: 2024-07-23

## 2023-08-11 ASSESSMENT — ENCOUNTER SYMPTOMS
SHORTNESS OF BREATH: 0
HEADACHES: 0
NERVOUS/ANXIOUS: 0
ARTHRALGIAS: 0
HEARTBURN: 0
CHILLS: 0
ABDOMINAL PAIN: 0
HEMATURIA: 0
MYALGIAS: 0
DIARRHEA: 0
PARESTHESIAS: 0
EYE PAIN: 0
NAUSEA: 0
JOINT SWELLING: 0
WEAKNESS: 0
DIZZINESS: 0
FREQUENCY: 0
COUGH: 0
HEMATOCHEZIA: 0
DYSURIA: 0
PALPITATIONS: 0
FEVER: 0
CONSTIPATION: 0
SORE THROAT: 0

## 2023-08-11 ASSESSMENT — PAIN SCALES - GENERAL: PAINLEVEL: NO PAIN (0)

## 2023-08-11 NOTE — PROGRESS NOTES
SUBJECTIVE:   CC: To is an 53 year old who presents for preventative health visit.       8/11/2023     6:57 AM   Additional Questions   Roomed by Reny TALAMANTES       Healthy Habits:     Getting at least 3 servings of Calcium per day:  Yes    Bi-annual eye exam:  Yes    Dental care twice a year:  Yes    Sleep apnea or symptoms of sleep apnea:  None    Diet:  Regular (no restrictions)    Frequency of exercise:  2-3 days/week    Duration of exercise:  30-45 minutes    Taking medications regularly:  Yes    Medication side effects:  None    Additional concerns today:  Yes              Social History     Tobacco Use    Smoking status: Never    Smokeless tobacco: Never   Substance Use Topics    Alcohol use: Yes     Comment: Rarely             8/4/2023    11:17 AM   Alcohol Use   Prescreen: >3 drinks/day or >7 drinks/week? No       Last PSA:   Prostate Specific Antigen Screen   Date Value Ref Range Status   07/15/2022 0.55 0.00 - 4.00 ug/L Final       Reviewed orders with patient. Reviewed health maintenance and updated orders accordingly - Yes  Lab work is in process    Reviewed and updated as needed this visit by clinical staff   Tobacco  Allergies  Meds              Reviewed and updated as needed this visit by Provider                 Past Medical History:   Diagnosis Date    Cholecystitis     Essential hypertension, benign 01/01/2000    Hypertension     Obesity     Other psoriasis     since childhood    Tubular adenoma       Past Surgical History:   Procedure Laterality Date    CHOLECYSTECTOMY      COLONOSCOPY      COLONOSCOPY N/A 2/17/2023    Procedure: COLONOSCOPY, WITH POLYPECTOMIES by snare and forcep;  Surgeon: Kulwinder Hollins MD;  Location:  GI    LAPAROSCOPIC CHOLECYSTECTOMY  07/03/2012    Procedure: LAPAROSCOPIC CHOLECYSTECTOMY;  Laparoscopic Cholecystectomy;  Surgeon: Kulwinder Hollins MD;  Location:  OR    NO HISTORY OF SURGERY         Review of Systems   Constitutional:  Negative for chills and fever.  "  HENT:  Negative for congestion, ear pain, hearing loss and sore throat.    Eyes:  Negative for pain and visual disturbance.   Respiratory:  Negative for cough and shortness of breath.    Cardiovascular:  Negative for chest pain, palpitations and peripheral edema.   Gastrointestinal:  Negative for abdominal pain, constipation, diarrhea, heartburn, hematochezia and nausea.   Genitourinary:  Positive for impotence. Negative for dysuria, frequency, genital sores, hematuria, penile discharge and urgency.   Musculoskeletal:  Negative for arthralgias, joint swelling and myalgias.   Skin:  Negative for rash.   Neurological:  Negative for dizziness, weakness, headaches and paresthesias.   Psychiatric/Behavioral:  Negative for mood changes. The patient is not nervous/anxious.        OBJECTIVE:   /80 (BP Location: Right arm, Patient Position: Chair)   Pulse 88   Temp 97.2  F (36.2  C) (Temporal)   Resp 16   Ht 1.873 m (6' 1.75\")   Wt 122.1 kg (269 lb 3.2 oz)   SpO2 97%   BMI 34.80 kg/m      Physical Exam  GENERAL: healthy, alert and no distress  EYES: Eyes grossly normal to inspection, PERRL and conjunctivae and sclerae normal  HENT: ear canals and TM's normal, nose and mouth without ulcers or lesions  NECK: no adenopathy, no asymmetry, masses, or scars and thyroid normal to palpation  RESP: lungs clear to auscultation - no rales, rhonchi or wheezes  CV: regular rate and rhythm, normal S1 S2, no S3 or S4, no murmur, click or rub, no peripheral edema and peripheral pulses strong  ABDOMEN: soft, nontender, no hepatosplenomegaly, no masses and bowel sounds normal  MS: no gross musculoskeletal defects noted, no edema  SKIN: no suspicious lesions or rashes  NEURO: Normal strength and tone, mentation intact and speech normal  PSYCH: mentation appears normal, affect normal/bright      ASSESSMENT/PLAN:   To was seen today for physical.    Diagnoses and all orders for this visit:    Routine general medical examination " "at a health care facility    Erectile dysfunction, unspecified erectile dysfunction type  -     Discontinue: sildenafil (VIAGRA) 25 MG tablet; Take 1 tablet (25 mg) by mouth daily as needed (intercourse)  -     sildenafil (VIAGRA) 25 MG tablet; Take 1 tablet (25 mg) by mouth daily as needed (intercourse)    Essential hypertension with goal blood pressure less than 140/90  Stable with current medications.  Repeat BMP today  -     amLODIPine (NORVASC) 10 MG tablet; Take 1 tablet (10 mg) by mouth daily  -     benazepril (LOTENSIN) 40 MG tablet; Take 1 tablet (40 mg) by mouth daily  -     Comprehensive metabolic panel (BMP + Alb, Alk Phos, ALT, AST, Total. Bili, TP); Future    Other psoriasis  Follows with dermatology regularly and stable on Skyrizi.  Recent blood counts normal    Class 1 obesity due to excess calories with serious comorbidity and body mass index (BMI) of 34.0 to 34.9 in adult    Screening for prostate cancer  -     PSA, screen; Future    Lipid screening  -     Lipid panel reflex to direct LDL Fasting; Future    Tubular adenoma  Colonoscopy updated this year    Other orders  -     REVIEW OF HEALTH MAINTENANCE PROTOCOL ORDERS        Patient has been advised of split billing requirements and indicates understanding: Yes      COUNSELING:   Reviewed preventive health counseling, as reflected in patient instructions       Regular exercise       Healthy diet/nutrition       Vision screening       Hearing screening       Immunizations       Aspirin prophylaxis        Colorectal cancer screening       Prostate cancer screening      BMI:   Estimated body mass index is 34.8 kg/m  as calculated from the following:    Height as of this encounter: 1.873 m (6' 1.75\").    Weight as of this encounter: 122.1 kg (269 lb 3.2 oz).   Weight management plan: Discussed healthy diet and exercise guidelines      He reports that he has never smoked. He has never used smokeless tobacco.            Christo Richard MD  [facility] " [facility]
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
## 2023-11-27 ENCOUNTER — MYC REFILL (OUTPATIENT)
Dept: FAMILY MEDICINE | Facility: CLINIC | Age: 53
End: 2023-11-27
Payer: COMMERCIAL

## 2023-11-27 DIAGNOSIS — N52.9 ERECTILE DYSFUNCTION, UNSPECIFIED ERECTILE DYSFUNCTION TYPE: ICD-10-CM

## 2023-11-27 RX ORDER — SILDENAFIL 25 MG/1
25 TABLET, FILM COATED ORAL DAILY PRN
Qty: 10 TABLET | Refills: 3 | Status: SHIPPED | OUTPATIENT
Start: 2023-11-27 | End: 2024-09-27

## 2023-12-12 DIAGNOSIS — L40.9 PSORIASIS: Primary | ICD-10-CM

## 2024-01-02 ENCOUNTER — TELEPHONE (OUTPATIENT)
Dept: DERMATOLOGY | Facility: CLINIC | Age: 54
End: 2024-01-02
Payer: COMMERCIAL

## 2024-01-02 NOTE — TELEPHONE ENCOUNTER
Prior Authorization Approval    Medication: SKYRIZI 150 MG/ML SC SOSY  Authorization Effective Date: 1/2/2024  Authorization Expiration Date: 1/2/2024  Approved Dose/Quantity: 1ml/12 wks  Reference #: [id number]   Insurance Company: [employer] - Phone [phone] Fax [phone]  Expected CoPay: $    CoPay Card Available:      Financial Assistance Needed: n  Which Pharmacy is filling the prescription: [facility] - [place], [place] - [address] [place] [address]  Pharmacy Notified: n  Patient Notified: n

## 2024-01-02 NOTE — TELEPHONE ENCOUNTER
PA Initiation    Medication: SKYRIZI 150 MG/ML [facility]  Insurance Company: [employer] - Phone [phone] Fax [phone]  Pharmacy Filling the Rx:    Filling Pharmacy Phone:    Filling Pharmacy Fax:    Start Date: 1/2/2024

## 2024-01-15 ENCOUNTER — VIRTUAL VISIT (OUTPATIENT)
Dept: DERMATOLOGY | Facility: CLINIC | Age: 54
End: 2024-01-15
Payer: COMMERCIAL

## 2024-01-15 DIAGNOSIS — Z51.81 MEDICATION MONITORING ENCOUNTER: ICD-10-CM

## 2024-01-15 DIAGNOSIS — L40.9 PSORIASIS: Primary | ICD-10-CM

## 2024-01-15 PROCEDURE — 99214 OFFICE O/P EST MOD 30 MIN: CPT | Performed by: DERMATOLOGY

## 2024-01-15 NOTE — PROGRESS NOTES
Teledermatology Nurse Call Patients:     Are you in the [facility] at the time of the encounter? yes    Today's visit will be billed to you and your insurance.    A teledermatology visit is not as thorough as an in-person visit and the quality of the photograph sent may not be of the same quality as that taken by the dermatology clinic.    Lisa Portillo

## 2024-01-15 NOTE — LETTER
1/15/2024         RE: To White  [address] [facility] [address]        Dear Colleague,    Thank you for referring your patient, To White, to the [facility]. Please see a copy of my visit note below.    Teledermatology Nurse Call Patients:     Are you in the [facility] at the time of the encounter? yes    Today's visit will be billed to you and your insurance.    A teledermatology visit is not as thorough as an in-person visit and the quality of the photograph sent may not be of the same quality as that taken by the dermatology clinic.    Lisa Portillo      [facility] Dermatology Note  Encounter Date: Derrick 15, 2024  Store-and-Forward and Telephone ([phone]). Location of teledermatologist: [facility].  Start time: 2:03. End time: 2:13.    Dermatology Problem List:  Last skin check: 6/30/23  1. Plaque psoriasis: 1 Skyrizi Otezla  triamcinolone 0.1% ointment, desonide 0.05% ointment, calcipotriene 0.005% ointment, Clobetasol  - Past tx: Soriatane 25 mg daily stopped due to pyogenic granulomas 6/2018, Marci Gan(had elevated WBC and saw heme), otezla didn't work   2. Onycholysis of distal right toenails, May be in association with psoriasis.   3. Seborrheic Dermatitis  - Current Tx: clobetasol, ketoconazole 2% shampoo    ____________________________________________    Assessment & Plan:     # Plaque psoriasis - Flared today. Pt reports Skyrizi experiencing flares towards several weeks before next Skyrizi injections.  Patches  on neck >5% TBSA today   - Continue clobetasol cream BID twice daily for up to 2 weeks.   - Continue Wesestcort cream BID x 2 weeks to the face, for upt o 2 weeks for face  -Stop Skyrizi if Stelara is approved and in hand. If not continue with next dose of Skyrizi. Do not take both the Skyrizi and Stelara.   - Start Stelara when next dose of Skyrizi was due if approved by insurance.    Reviewed same risks and need for continued covid vaccination as this is missing he would like to go back on this. Understands immune suppression reviewed again. Understand these are both biologics.   -Hold drug for illness.  - Recommended COVID vaccination    -Labs ordered: Quant gold, CBC due in 6 momths        #Hx of elevated WBC-recheck today  -cbc was elevated on stelara but then saw heme onc, eventually normalized        Procedures Performed:    None    Follow-up: Previously scheduled appointment in June    Staff and Scribe:   Scribe Disclosure:   I, Jessica Menendez, am serving as a scribe to document services personally performed by Jacey Cervantes MD based on data collection and the provider's statements to me. .      Provider Disclosure:   The documentation recorded by the scribe accurately reflects the services I personally performed and the decisions made by me.    Jacey Cervantes MD    Department of Dermatology  [facility]: Phone: [phone], Fax:[phone]  [facility]: Phone: [phone], Fax: [phone]   ____________________________________________    CC: Rash (Patient noticed a rash around their neck approx. 2 weeks ago. Patient reports that the locations is itchy, sensitive and uncomfortable. Pt. Has a hx of plaque psoriasis and is currently taking clobetasol, skyrizi and calcipotriene. Patient states that they have not made any changes to med regimen. /Location: Neck)    HPI:  Mr. To White is a(n) 53 year old male who presents today as a return patient for psoriasis.    Last seen by me 6/30/23. Treatment plan was unchanged.    Today, patient reports he is experiencing a flare. He notices this towards the last month before his next Skyrizi injection. This time has been the worst flare up.      Wants his stelara back    Patient is otherwise feeling well, without additional  skin concerns.    Labs Reviewed:  N/A    Physical Exam:  Vitals: There were no vitals taken for this visit.  SKIN: Teledermatology photos were reviewed; image quality and interpretability: acceptable. Image date: 1/12/24.  - Scaly plaques on the neck.   - No other lesions of concern on areas examined.     Medications:  Current Outpatient Medications   Medication     amLODIPine (NORVASC) 10 MG tablet     benazepril (LOTENSIN) 40 MG tablet     calcipotriene 0.005 % OINT     clobetasol (TEMOVATE) 0.05 % external ointment     clobetasol (TEMOVATE) 0.05 % external solution     desonide (DESOWEN) 0.05 % external ointment     Fexofenadine HCl (ALLEGRA PO)     hydrocortisone (WESTCORT) 0.2 % external cream     hydrOXYzine (ATARAX) 25 MG tablet     Risankizumab-rzaa (SKYRIZI) 150 MG/ML subcutaneous     sildenafil (VIAGRA) 25 MG tablet     No current facility-administered medications for this visit.      Past Medical/Surgical History:   Patient Active Problem List   Diagnosis     Other psoriasis     Obesity     Essential hypertension with goal blood pressure less than 140/90     Obesity, Class I, BMI 30-34.9     Benign cyst of both testicles     Past Medical History:   Diagnosis Date     Cholecystitis      Essential hypertension, benign 01/01/2000     Hypertension      Obesity      Other psoriasis     since childhood     Tubular adenoma        CC No referring provider defined for this encounter. on close of this encounter.      Again, thank you for allowing me to participate in the care of your patient.        Sincerely,        Jacey Cervantes MD

## 2024-01-15 NOTE — PROGRESS NOTES
[facility] Dermatology Note  Encounter Date: Derrick 15, 2024  Store-and-Forward and Telephone ([phone]). Location of teledermatologist: [facility].  Start time: 2:03. End time: 2:13.    Dermatology Problem List:  Last skin check: 6/30/23  1. Plaque psoriasis: 1 Skyrizi Otezla  triamcinolone 0.1% ointment, desonide 0.05% ointment, calcipotriene 0.005% ointment, Clobetasol  - Past tx: Soriatane 25 mg daily stopped due to pyogenic granulomas 6/2018, Marci Gan(had elevated WBC and saw heme), otezla didn't work   2. Onycholysis of distal right toenails, May be in association with psoriasis.   3. Seborrheic Dermatitis  - Current Tx: clobetasol, ketoconazole 2% shampoo    ____________________________________________    Assessment & Plan:     # Plaque psoriasis - Flared today. Pt reports Skyrizi experiencing flares towards several weeks before next Skyrizi injections.  Patches  on neck >5% TBSA today   - Continue clobetasol cream BID twice daily for up to 2 weeks.   - Continue Wesestcort cream BID x 2 weeks to the face, for upt o 2 weeks for face  -Stop Skyrizi if Stelara is approved and in hand. If not continue with next dose of Skyrizi. Do not take both the Skyrizi and Stelara.   - Start Stelara when next dose of Skyrizi was due if approved by insurance.   Reviewed same risks and need for continued covid vaccination as this is missing he would like to go back on this. Understands immune suppression reviewed again. Understand these are both biologics.   -Hold drug for illness.  - Recommended COVID vaccination    -Labs ordered: Quant gold, CBC due in 6 momths        #Hx of elevated WBC-recheck today  -cbc was elevated on stelara but then saw heme onc, eventually normalized        Procedures Performed:    None    Follow-up: Previously scheduled appointment in June    Staff and Scribe:   Scribe Disclosure:   I, Jessica Menendez, am serving as a scribe to document services  personally performed by Jacey Cervantes MD based on data collection and the provider's statements to me. .      Provider Disclosure:   The documentation recorded by the scribe accurately reflects the services I personally performed and the decisions made by me.    Jacey Cervantes MD    Department of Dermatology  [facility]: Phone: [phone], Fax:[phone]  [facility]: Phone: [phone], Fax: [phone]   ____________________________________________    CC: Rash (Patient noticed a rash around their neck approx. 2 weeks ago. Patient reports that the locations is itchy, sensitive and uncomfortable. Pt. Has a hx of plaque psoriasis and is currently taking clobetasol, skyrizi and calcipotriene. Patient states that they have not made any changes to med regimen. /Location: Neck)    HPI:  Mr. To White is a(n) 53 year old male who presents today as a return patient for psoriasis.    Last seen by me 6/30/23. Treatment plan was unchanged.    Today, patient reports he is experiencing a flare. He notices this towards the last month before his next Skyrizi injection. This time has been the worst flare up.      Wants his stelara back    Patient is otherwise feeling well, without additional skin concerns.    Labs Reviewed:  N/A    Physical Exam:  Vitals: There were no vitals taken for this visit.  SKIN: Teledermatology photos were reviewed; image quality and interpretability: acceptable. Image date: 1/12/24.  - Scaly plaques on the neck.   - No other lesions of concern on areas examined.     Medications:  Current Outpatient Medications   Medication    amLODIPine (NORVASC) 10 MG tablet    benazepril (LOTENSIN) 40 MG tablet    calcipotriene 0.005 % OINT    clobetasol (TEMOVATE) 0.05 % external ointment    clobetasol (TEMOVATE) 0.05 % external solution    desonide (DESOWEN) 0.05 % external ointment     Fexofenadine HCl (ALLEGRA PO)    hydrocortisone (WESTCORT) 0.2 % external cream    hydrOXYzine (ATARAX) 25 MG tablet    Risankizumab-rzaa (SKYRIZI) 150 MG/ML subcutaneous    sildenafil (VIAGRA) 25 MG tablet     No current facility-administered medications for this visit.      Past Medical/Surgical History:   Patient Active Problem List   Diagnosis    Other psoriasis    Obesity    Essential hypertension with goal blood pressure less than 140/90    Obesity, Class I, BMI 30-34.9    Benign cyst of both testicles     Past Medical History:   Diagnosis Date    Cholecystitis     Essential hypertension, benign 01/01/2000    Hypertension     Obesity     Other psoriasis     since childhood    Tubular adenoma        CC No referring provider defined for this encounter. on close of this encounter.

## 2024-01-17 ENCOUNTER — TELEPHONE (OUTPATIENT)
Dept: DERMATOLOGY | Facility: CLINIC | Age: 54
End: 2024-01-17

## 2024-01-17 ENCOUNTER — VIRTUAL VISIT (OUTPATIENT)
Dept: RHEUMATOLOGY | Facility: CLINIC | Age: 54
End: 2024-01-17
Attending: DERMATOLOGY
Payer: COMMERCIAL

## 2024-01-17 DIAGNOSIS — L40.9 PSORIASIS (A TYPE OF SKIN INFLAMMATION): Primary | ICD-10-CM

## 2024-01-17 DIAGNOSIS — I10 ESSENTIAL HYPERTENSION WITH GOAL BLOOD PRESSURE LESS THAN 140/90: ICD-10-CM

## 2024-01-17 NOTE — TELEPHONE ENCOUNTER
PA Initiation    Medication: STELARA 90 MG/ML [facility]  Insurance Company: [employer] - Phone [phone] Fax [phone]  Pharmacy Filling the Rx:    Filling Pharmacy Phone:    Filling Pharmacy Fax:    Start Date: 1/17/2024    Key: [id number]

## 2024-01-17 NOTE — Clinical Note
1/17/2024       RE: Ruben White  [address] [facility] [address]     Dear Colleague,    Thank you for referring your patient, Ruben White, to the [facility] [place] [facility]. Please see a copy of my visit note below.    Medication Therapy Management (MTM) Encounter    ASSESSMENT:                            Medication Adherence/Access: No issues identified    1. Plaque psoriasis: needs improvement, continuing to have flares of psoriasis after 8 weeks of Skyrizi (risankizumab).  Patient would benefit from transitioning to Stelara (ustekinumab) as this was very effective for patient in the past.  His past high white count while on Stelara (ustekinumab) resolved and was seen by hematology and found no etiology in 2022.  Discussed with patient the need to recheck white blood cell count 6 months after re-starting the Stelara (ustekinumab) and will follow closely given past history, and that future lab order was placed by Dr. Cervantes and can be done at any [facility].  Provided education on Stelara (ustekinumab) today including dosing, side effects (both common/serious), monitoring and time to efficacy. Patient stated he had no other questions regarding Stelara (ustekinumab) given he was on it for years prior.  Would benefit from starting Stelara (ustekinumab) once it arrives and when next Skyrizi (risankizumab) would have been due in the middle of February.  Discussed should not to be on both Skyrizi (risankizumab) and Stelara (ustekinumab).  Also discussed that if continues to see flares while on Stelara (ustekinumab) at 8 week ruben, there is data that this medication can be given every 8 weeks if we need to at follow up.  Also, discussed need for yearly QFN gold test and recommendation for seasonal COVID vaccine while on biologic medication.    2. Hypertension: stable, continue current regimen, last blood pressure was at  "goal of <140/90, continue follow up with PCP.      PLAN:                            Order sent for Stelara (ustekinumab) to [facility].  They will contact patient pending insurance coverage.   Stelara (ustekinumab) 90 mg subcutaneousat 0 and 4 weeks, and then 90 mg every 12 weeks thereafter    2. Patient to discontinue Skyrizi (risankizumab) and begin Stelara (ustekinumab) when next Skyrizi (risankizumab) would be due pending insurance approval    3.  Recheck CBC in 6 months, future lab ordered already placed by Dr. Cervantes    Follow-up: ***    SUBJECTIVE/OBJECTIVE:                          To White is a 53 year old male called for an initial visit. He was referred to me from Dr. Jacey Cervantes MD.      Reason for visit: switching from Skyrizi (risankizumab) to Stelara (ustekinumab).    Allergies/ADRs: None  Past Medical History: Reviewed in chart  Tobacco: He reports that he has never smoked. He has never used smokeless tobacco.  Alcohol: did not assess today      Medication Adherence/Access: no issues reported    1. Plaque psoriasis:   -Skyrizi (risankizumab) 150mg every 12 weeks  Last injection 12/14/23, Next injection due on 2/15/24, receives through [facility] with no recent issues with [facility] [place] good through 1/2/2024    Weight on 8/11/23: 122.1kg    - Clobetasol 0.05% cream twice daily up to two weeks  - Hydrocortisone 0.2% cream twice daily for 2 weeks on face    Side effects: Patient reports no injection site reactions or infections while on Skyrizi (risankizumab) or Stelara (ustekinumab).    Symptoms: good for 2 months after Skyrizi (risankizumab) injection then flares, and last month has been having \"big\" flares. His psoriasis flares are on torso/back, arms, all around neck    Specialist: Dr. Jacey Cervantes MD , Dermatology. Last visit on 1/15/2024. The following was recommended:   - Stop Skyrizi if Stelara is approved and in hand. If not continue with next dose of Skyrizi. Do not " take both the Skyrizi and Stelara.   - Start Stelara when next dose of Skyrizi was due if approved by insurance.    Previous treatment:   - Soriatane - stopped due to pyogenic granulomas 6/2018  - Humira (adalimumab) - NOT EFFECTIVE 2018   - Stelara (ustekinumab)- had elevated WBC count and stopped and saw hematology which found no specific diagnosis and eventually resolved, medication was very effective [id number]   - Otezla (apremilast)  - NOT EFFECTIVE, 1/2022 - 10/2022  - Triamcinolone 0.1% ointmenyt  - Desonide 0.05% ointment  - Calcipotriene 0.005% ointment    Pre-Biologic Screening:   Hep B Surface Antibody Non-reactive (10/19/2022)    Hep B Core Antibody  Non-reactive (10/19/2022)    Hep B Surface Antigen Non-reactive (10/19/2022)    Hep C Antibody  Non-reactive (10/19/2022)    HIV Antigen Antibody Non-reactive (10/19/2022)    Quantiferon TB Gold Negative (6/30/2023)       CBC RESULTS:   Recent Labs   Lab Test 06/30/23  1016   WBC 9.8   RBC 5.02   HGB 14.9   HCT 44.7   MCV 89   MCH 29.7   MCHC 33.3   RDW 13.2          Last Comprehensive Metabolic Panel:  Sodium   Date Value Ref Range Status   08/11/2023 137 136 - 145 mmol/L Final   09/04/2020 138 133 - 144 mmol/L Final     Potassium   Date Value Ref Range Status   08/11/2023 4.3 3.4 - 5.3 mmol/L Final   10/19/2022 3.6 3.4 - 5.3 mmol/L Final   09/04/2020 4.6 3.4 - 5.3 mmol/L Final     Chloride   Date Value Ref Range Status   08/11/2023 102 98 - 107 mmol/L Final   10/19/2022 107 94 - 109 mmol/L Final   09/04/2020 105 94 - 109 mmol/L Final     Carbon Dioxide   Date Value Ref Range Status   09/04/2020 27 20 - 32 mmol/L Final     Carbon Dioxide (CO2)   Date Value Ref Range Status   08/11/2023 26 22 - 29 mmol/L Final   10/19/2022 25 20 - 32 mmol/L Final     Anion Gap   Date Value Ref Range Status   08/11/2023 9 7 - 15 mmol/L Final   10/19/2022 6 3 - 14 mmol/L Final   09/04/2020 6 3 - 14 mmol/L Final     Glucose   Date Value Ref Range Status   08/11/2023 102  (H) 70 - 99 mg/dL Final   10/19/2022 130 (H) 70 - 99 mg/dL Final   09/04/2020 97 70 - 99 mg/dL Final     Urea Nitrogen   Date Value Ref Range Status   08/11/2023 11.0 6.0 - 20.0 mg/dL Final   10/19/2022 9 7 - 30 mg/dL Final   09/04/2020 15 7 - 30 mg/dL Final     Creatinine   Date Value Ref Range Status   08/11/2023 1.04 0.67 - 1.17 mg/dL Final   02/24/2021 1.01 0.66 - 1.25 mg/dL Final     GFR Estimate   Date Value Ref Range Status   08/11/2023 86 >60 mL/min/1.73m2 Final   02/24/2021 86 >60 mL/min/[1.73_m2] Final     Comment:     Non  GFR Calc  Starting 12/18/2018, serum creatinine based estimated GFR (eGFR) will be   calculated using the Chronic Kidney Disease Epidemiology Collaboration   (CKD-EPI) equation.       Calcium   Date Value Ref Range Status   08/11/2023 9.4 8.6 - 10.0 mg/dL Final   09/04/2020 9.2 8.5 - 10.1 mg/dL Final     Bilirubin Total   Date Value Ref Range Status   08/11/2023 0.3 <=1.2 mg/dL Final   02/24/2021 0.3 0.2 - 1.3 mg/dL Final     Alkaline Phosphatase   Date Value Ref Range Status   08/11/2023 108 40 - 129 U/L Final   02/24/2021 88 40 - 150 U/L Final     ALT   Date Value Ref Range Status   08/11/2023 14 0 - 70 U/L Final     Comment:     Reference intervals for this test were updated on 6/12/2023 to more accurately reflect our healthy population. There may be differences in the flagging of prior results with similar values performed with this method. Interpretation of those prior results can be made in the context of the updated reference intervals.     07/09/2021 31 0 - 70 U/L Final     AST   Date Value Ref Range Status   08/11/2023 17 0 - 45 U/L Final     Comment:     Reference intervals for this test were updated on 6/12/2023 to more accurately reflect our healthy population. There may be differences in the flagging of prior results with similar values performed with this method. Interpretation of those prior results can be made in the context of the updated reference  intervals.   07/09/2021 16 0 - 45 U/L Final     All patients on biologics should avoid live vaccines (varicella/VZV, intranasal influenza, MMR, or yellow fever vaccine (if traveling))      Immunization History   Covid-19 vaccine (9642-7332 version)  Due to receive   Influenza (annual, 1239-2389) Complete, avoid live FluMist   Pneumococcal  Prevnar-13: 9/28/2021  Pneumovax-23: 07/03/2018  Up-to-date   Tetanus/Tdap  Up-to-date   Shingrix Complete     2. Hypertension  - Amlodipine 10mg every day  - Benazepril 40mg every day   Patient reports he does not take blood pressure at home but will at automated machines at stores and pharmacy  BP Readings from Last 3 Encounters:   08/11/23 122/80   02/17/23 112/88   07/15/22 112/80   Patient reports no symptoms of dizziness, headaches or any other side effects    Today's Vitals: There were no vitals taken for this visit.  ----------------      I spent 10 minutes with this patient today. All changes were made via collaborative practice agreement with Dr. Jacey Cervantes MD. A copy of the visit note was provided to the patient's provider(s).    A summary of these recommendations was sent via [employer].    Jose Rafael HernandezD, [facility]  Medication Therapy Management Pharmacist  [facility] Dermatology    Telemedicine Visit Details  Type of service:  Telephone visit  Start Time: 9:30 AM  End Time:  9:40 AM     Medication Therapy Recommendations  No medication therapy recommendations to display           Again, thank you for allowing me to participate in the care of your patient.      Sincerely,    Perri March [facility]

## 2024-01-17 NOTE — PROGRESS NOTES
Medication Therapy Management (MTM) Encounter    ASSESSMENT:                            Medication Adherence/Access: No issues identified    1. Plaque psoriasis: needs improvement, continuing to have flares of psoriasis after 8 weeks of Skyrizi (risankizumab).  Patient would benefit from transitioning to Stelara (ustekinumab) as this was very effective for patient in the past.  His past high white count while on Stelara (ustekinumab) resolved and was seen by hematology and found no etiology in 2022.  Discussed with patient the need to recheck white blood cell count 6 months after re-starting the Stelara (ustekinumab) and will follow closely given past history, and that future lab order was placed by Dr. Cervantes and can be done at any [facility].  Provided education on Stelara (ustekinumab) today including dosing, side effects (both common/serious), monitoring and time to efficacy. Patient stated he had no other questions regarding Stelara (ustekinumab) given he was on it for years prior.  Would benefit from starting Stelara (ustekinumab) once it arrives and when next Skyrizi (risankizumab) would have been due in the middle of February.  Discussed should not to be on both Skyrizi (risankizumab) and Stelara (ustekinumab).  Also discussed that if continues to see flares while on Stelara (ustekinumab) at 8 week ruben, there is data that this medication can be given every 8 weeks if we need to at follow up.  Also, discussed need for yearly QFN gold test and recommendation for seasonal COVID vaccine while on biologic medication.    2. Hypertension: stable, continue current regimen, last blood pressure was at goal of <140/90, continue follow up with PCP.      PLAN:                            Order sent for Stelara (ustekinumab) to [facility].  They will contact patient pending insurance coverage.   Stelara (ustekinumab) 90 mg subcutaneous at 0 and 4 weeks, and then 90 mg every 12 weeks thereafter    2.  "Patient to discontinue Skyrizi (risankizumab) and begin Stelara (ustekinumab) when next Skyrizi (risankizumab) would be due pending insurance approval    3.  Recheck CBC in 6 months, future lab ordered already placed by Dr. Cervantes    Follow-up: via MyChart pending insurance approval, and then over telephone visit 1 month after restarting Stelara (ustekinumab).    SUBJECTIVE/OBJECTIVE:                          To White is a 53 year old male called for an initial visit. He was referred to me from Dr. Jacey Cervantes MD.      Reason for visit: switching from Skyrizi (risankizumab) to Stelara (ustekinumab).    Allergies/ADRs: None  Past Medical History: Reviewed in chart  Tobacco: He reports that he has never smoked. He has never used smokeless tobacco.  Alcohol: did not assess today      Medication Adherence/Access: no issues reported    1. Plaque psoriasis:   -Skyrizi (risankizumab) 150mg every 12 weeks  Last injection 12/14/23, Next injection due on 2/15/24, receives through [facility] with no recent issues with deliveries, PA good through 1/2/2024    Weight on 8/11/23: 122.1kg    - Clobetasol 0.05% cream twice daily up to two weeks  - Hydrocortisone 0.2% cream twice daily for 2 weeks on face    Side effects: Patient reports no injection site reactions or infections while on Skyrizi (risankizumab) or Stelara (ustekinumab).    Symptoms: good for 2 months after Skyrizi (risankizumab) injection then flares, and last month has been having \"big\" flares. His psoriasis flares are on torso/back, arms, all around neck    Specialist: Dr. Jacey Cervantes MD , Dermatology. Last visit on 1/15/2024. The following was recommended:   - Stop Skyrizi if Stelara is approved and in hand. If not continue with next dose of Skyrizi. Do not take both the Skyrizi and Stelara.   - Start Stelara when next dose of Skyrizi was due if approved by insurance.    Previous treatment:   - Soriatane - stopped due to pyogenic granulomas 6/2018  - " Humira (adalimumab) - NOT EFFECTIVE 2018   - Stelara (ustekinumab)- had elevated WBC count and stopped and saw hematology which found no specific diagnosis and eventually resolved, medication was very effective 7839-5749   - Otezla (apremilast)  - NOT EFFECTIVE, 1/2022 - 10/2022  - Triamcinolone 0.1% ointmenyt  - Desonide 0.05% ointment  - Calcipotriene 0.005% ointment    Pre-Biologic Screening:   Hep B Surface Antibody Non-reactive (10/19/2022)    Hep B Core Antibody  Non-reactive (10/19/2022)    Hep B Surface Antigen Non-reactive (10/19/2022)    Hep C Antibody  Non-reactive (10/19/2022)    HIV Antigen Antibody Non-reactive (10/19/2022)    Quantiferon TB Gold Negative (6/30/2023)       CBC RESULTS:   Recent Labs   Lab Test 06/30/23  1016   WBC 9.8   RBC 5.02   HGB 14.9   HCT 44.7   MCV 89   MCH 29.7   MCHC 33.3   RDW 13.2          Last Comprehensive Metabolic Panel:  Sodium   Date Value Ref Range Status   08/11/2023 137 136 - 145 mmol/L Final   09/04/2020 138 133 - 144 mmol/L Final     Potassium   Date Value Ref Range Status   08/11/2023 4.3 3.4 - 5.3 mmol/L Final   10/19/2022 3.6 3.4 - 5.3 mmol/L Final   09/04/2020 4.6 3.4 - 5.3 mmol/L Final     Chloride   Date Value Ref Range Status   08/11/2023 102 98 - 107 mmol/L Final   10/19/2022 107 94 - 109 mmol/L Final   09/04/2020 105 94 - 109 mmol/L Final     Carbon Dioxide   Date Value Ref Range Status   09/04/2020 27 20 - 32 mmol/L Final     Carbon Dioxide (CO2)   Date Value Ref Range Status   08/11/2023 26 22 - 29 mmol/L Final   10/19/2022 25 20 - 32 mmol/L Final     Anion Gap   Date Value Ref Range Status   08/11/2023 9 7 - 15 mmol/L Final   10/19/2022 6 3 - 14 mmol/L Final   09/04/2020 6 3 - 14 mmol/L Final     Glucose   Date Value Ref Range Status   08/11/2023 102 (H) 70 - 99 mg/dL Final   10/19/2022 130 (H) 70 - 99 mg/dL Final   09/04/2020 97 70 - 99 mg/dL Final     Urea Nitrogen   Date Value Ref Range Status   08/11/2023 11.0 6.0 - 20.0 mg/dL Final    10/19/2022 9 7 - 30 mg/dL Final   09/04/2020 15 7 - 30 mg/dL Final     Creatinine   Date Value Ref Range Status   08/11/2023 1.04 0.67 - 1.17 mg/dL Final   02/24/2021 1.01 0.66 - 1.25 mg/dL Final     GFR Estimate   Date Value Ref Range Status   08/11/2023 86 >60 mL/min/1.73m2 Final   02/24/2021 86 >60 mL/min/[1.73_m2] Final     Comment:     Non  GFR Calc  Starting 12/18/2018, serum creatinine based estimated GFR (eGFR) will be   calculated using the Chronic Kidney Disease Epidemiology Collaboration   (CKD-EPI) equation.       Calcium   Date Value Ref Range Status   08/11/2023 9.4 8.6 - 10.0 mg/dL Final   09/04/2020 9.2 8.5 - 10.1 mg/dL Final     Bilirubin Total   Date Value Ref Range Status   08/11/2023 0.3 <=1.2 mg/dL Final   02/24/2021 0.3 0.2 - 1.3 mg/dL Final     Alkaline Phosphatase   Date Value Ref Range Status   08/11/2023 108 40 - 129 U/L Final   02/24/2021 88 40 - 150 U/L Final     ALT   Date Value Ref Range Status   08/11/2023 14 0 - 70 U/L Final     Comment:     Reference intervals for this test were updated on 6/12/2023 to more accurately reflect our healthy population. There may be differences in the flagging of prior results with similar values performed with this method. Interpretation of those prior results can be made in the context of the updated reference intervals.     07/09/2021 31 0 - 70 U/L Final     AST   Date Value Ref Range Status   08/11/2023 17 0 - 45 U/L Final     Comment:     Reference intervals for this test were updated on 6/12/2023 to more accurately reflect our healthy population. There may be differences in the flagging of prior results with similar values performed with this method. Interpretation of those prior results can be made in the context of the updated reference intervals.   07/09/2021 16 0 - 45 U/L Final     All patients on biologics should avoid live vaccines (varicella/VZV, intranasal influenza, MMR, or yellow fever vaccine (if traveling))       Immunization History   Covid-19 vaccine (7466-3003 version)  Due to receive   Influenza (annual, 7292-4528) Complete, avoid live FluMist   Pneumococcal  Prevnar-13: 9/28/2021  Pneumovax-23: 07/03/2018  Up-to-date   Tetanus/Tdap  Up-to-date   Shingrix Complete     2. Hypertension  - Amlodipine 10mg every day  - Benazepril 40mg every day   Patient reports he does not take blood pressure at home but will at automated machines at stores and pharmacy  BP Readings from Last 3 Encounters:   08/11/23 122/80   02/17/23 112/88   07/15/22 112/80   Patient reports no symptoms of dizziness, headaches or any other side effects    Today's Vitals: There were no vitals taken for this visit.  ----------------      I spent 10 minutes with this patient today. All changes were made via collaborative practice agreement with Dr. Jacey Cervantes MD. A copy of the visit note was provided to the patient's provider(s).    A summary of these recommendations was sent via Offe[employer].    Perri March, Jose RafaelD, [facility]  Medication Therapy Management Pharmacist  [facility]    Telemedicine Visit Details  Type of service:  Telephone visit  Start Time: 9:30 AM  End Time:  9:40 AM     Medication Therapy Recommendations  No medication therapy recommendations to display

## 2024-01-19 NOTE — TELEPHONE ENCOUNTER
Prior Authorization Approval    Medication: STELARA 90 MG/ML SC SOSY  Authorization Effective Date: 1/18/2024  Authorization Expiration Date: 1/18/2025  Approved Dose/Quantity: 1 syringe per 28 days for 2 months, then 1 pen 84 days  Reference #: Key: [id number]   Insurance Company: [employer] - Phone [phone] Fax [phone]  Expected CoPay: $    CoPay Card Available:      Financial Assistance Needed: na  Which Pharmacy is filling the prescription:    Pharmacy Notified: no  Patient Notified: yes        Jodi Brown CphT  [facility] Liaison  [email or web address]  Phone: [phone]  Fax: [phone]

## 2024-05-01 ENCOUNTER — PATIENT OUTREACH (OUTPATIENT)
Dept: GASTROENTEROLOGY | Facility: CLINIC | Age: 54
End: 2024-05-01
Payer: COMMERCIAL

## 2024-06-27 NOTE — PROGRESS NOTES
[facility] Dermatology Note  Encounter Date: Jun 28, 2024  Office Visit     Dermatology Problem List:  Last skin check: 6/30/23  1. Plaque psoriasis: Skyrizi  triamcinolone 0.1% ointment, desonide 0.05% ointment, calcipotriene 0.005% ointment, Clobetasol  - Past tx: Soriatane 25 mg daily stopped due to pyogenic granulomas 6/2018, aMrci Gan(had elevated WBC and saw heme), otezla didn't work   2. Onycholysis of distal right toenails, May be in association with psoriasis.   3. Seborrheic Dermatitis  - Current Tx: clobetasol, ketoconazole 2% shampoo    ____________________________________________    Assessment & Plan:  # Plaque psoriasis - Flaring today. Pt reports Marci worked minimally after starting but has been ineffective since restarting.  Patches  on trunk and extremeties >5% TBSA today   - Continue clobetasol cream BID twice daily for up to 2 weeks. Pt likes triamcinolone on hand also for this.   - Continue Wesestcort cream BID x 2 weeks to the face, for up to 2 weeks for face  - Desonide as above for the groin  - Start Secukinumab 300mg (2 injections of 150mg) weekly for 5 weeks. Then, monthly. We reviewed the warnings for candida infection and IBD.   We reviewed this drug is indicated for moderate or severe plaque psoriasis for patients who are candidates for systemic or phototherapy. The patient will self inject after training. We reviewed that a greater frequency of Crohns and Ulcerative colitis has been associated with this drug, including exacerbations Other side effects include injection site reactions, URI, nausea, and tinea. There is also a risk of antibody development.  Side effects of treatment with biologic therapy were reviewed including but not limited to immune suppression, increased susceptibility to infection, injection site and systemic reactions, and possible increased risk of lymphoma and other malignancy.  Discussed need to contact clinic if patient is ill or  not feeling well as we may hold medication. Patient is up to date on vaccinations. Discussed that live vaccinations should not be given while on this medication and clinic should be contact prior to vaccinations.  Patient should also get yearly flu/covid, get hep B series  vaccination.   Reviewed risks of light including blister, burn.   - - No symptoms or history concerning for TB               No known TB exposure               No recent travel to TB endemic area              No chronic cough, hemoptysis, major weight changes or night sweats.              Not a healthcare worker              Not homeless and has not worked in a homeless shelter         Home UVB phototherapy unit  Unit type: full body  Diagnosis: severe psoriasis vulgaris  Greater than 5% BSA involvement.  Areas of involvement: trunk, extremities, genitals  Franks skin type: II  Frequency: three times per week  Once home unit received, patient should follow treatment guidelines for psoriasis listed in the Operations Manual that came with unit.  If refill is needed on device AND patient has been seen within the 3 months, I authorize 75 (3 months) treatments.        IS leaving [facility] this summer so his follow up with be his next TB screening and recommend hep b series         Follow-up: 6 months photos and phone, or earlier for new or changing lesions    Staff and Scribe:     Scribe Disclosure:   I, Jessica Menendez, am serving as a scribe to document services personally performed by Jacey Cervantes MD based on data collection and the provider's statements to me.     Provider Disclosure:   The documentation recorded by the scribe accurately reflects the services I personally performed and the decisions made by me.    Jacey Cervantes MD    Department of Dermatology  [facility]: Phone: [phone], Fax:[phone]  [facility]:  Phone: [phone], Fax: [phone]   ____________________________________________    CC: Psoriasis (Stelara has been ineffective - would like to discuss home UVB treatment)    HPI:  Mr. To White is a(n) 54 year old male who presents today as a return patient for psoriasis. He reports that Stelara has not been effective after restarting.  He would like to discuss starting UVB therapy at home.     Today, patient reports he wants off stelara and to try new drug    No illness. No hospitalizations. No cough. Feels good    Skyrizi also did work.       Patient is otherwise feeling well, without additional skin concerns.    Labs Reviewed:  Component      Latest Ref Rng 8/11/2023  7:27 AM   WBC      4.0 - 11.0 10e3/uL    RBC Count      4.40 - 5.90 10e6/uL    Hemoglobin      13.3 - 17.7 g/dL    Hematocrit      40.0 - 53.0 %    MCV      78 - 100 fL    MCH      26.5 - 33.0 pg    MCHC      31.5 - 36.5 g/dL    RDW      10.0 - 15.0 %    Platelet Count      150 - 450 10e3/uL    % Neutrophils      %    % Lymphocytes      %    % Monocytes      %    % Eosinophils      %    % Basophils      %    % Immature Granulocytes      %    NRBCs per 100 WBC      <1 /100    Absolute Neutrophils      1.6 - 8.3 10e3/uL    Absolute Lymphocytes      0.8 - 5.3 10e3/uL    Absolute Monocytes      0.0 - 1.3 10e3/uL    Absolute Eosinophils      0.0 - 0.7 10e3/uL    Absolute Basophils      0.0 - 0.2 10e3/uL    Absolute Immature Granulocytes      <=0.4 10e3/uL    Absolute NRBCs      10e3/uL    Sodium      136 - 145 mmol/L 137    Potassium      3.4 - 5.3 mmol/L 4.3    Chloride      98 - 107 mmol/L 102    Carbon Dioxide (CO2)      22 - 29 mmol/L 26    Anion Gap      7 - 15 mmol/L 9    Urea Nitrogen      6.0 - 20.0 mg/dL 11.0    Creatinine      0.67 - 1.17 mg/dL 1.04    Calcium      8.6 - 10.0 mg/dL 9.4    Glucose      70 - 99 mg/dL 102 (H)    Alkaline Phosphatase      40 - 129 U/L 108    AST      0 - 45 U/L 17    ALT      0 - 70 U/L 14    Protein  Total      6.4 - 8.3 g/dL 7.3    Albumin      3.5 - 5.2 g/dL 4.2    Bilirubin Total      <=1.2 mg/dL 0.3    GFR Estimate      >60 mL/min/1.73m2 86    Quantiferon-TB Gold Plus Result      Negative     TB1 Ag minus Nil Value      IU/mL    TB2 Ag minus Nil Value      IU/mL    Mitogen minus Nil Result      IU/mL    Nil Result      IU/mL    Cholesterol      <200 mg/dL 187    Triglycerides      <150 mg/dL 128    HDL Cholesterol      >=40 mg/dL 44    LDL Cholesterol Calculated      <=100 mg/dL 117 (H)    Non HDL Cholesterol      <130 mg/dL 143 (H)    Quantiferon Nil Tube      IU/mL    Quantiferon TB1 Tube      IU/mL    Quantiferon TB2 Tube    QUANTIFERON MITOGEN      IU/mL    PSA Tumor Marker      0.00 - 3.50 ng/mL 0.42       Legend:  (H) High        Physical Exam:  Vitals: There were no vitals taken for this visit.  SKIN: Total skin excluding the undergarment areas was performed. The exam included the head/face, neck, both arms, chest, back, abdomen, both legs, digits and/or nails.   -   - There are erythematous well demarcated plaques with silvery micaceous scale on the trunk and extremities..   Dana Marquez CMA present   - No other lesions of concern on areas examined.     Medications:  Current Outpatient Medications   Medication Sig Dispense Refill    amLODIPine (NORVASC) 10 MG tablet Take 1 tablet (10 mg) by mouth daily 90 tablet 3    benazepril (LOTENSIN) 40 MG tablet Take 1 tablet (40 mg) by mouth daily 90 tablet 3    calcipotriene 0.005 % OINT Apply to the affected area twice a day Mon-Fri when rash is relatively calm. 240 g 3    clobetasol (TEMOVATE) 0.05 % external ointment Apply twice daily for 2 weeks to back. 60 g 4    clobetasol (TEMOVATE) 0.05 % external solution Apply on scalp for up to 2 weeks in a row for flares 50 mL 3    desonide (DESOWEN) 0.05 % external ointment Apply to the affected area of the skin of groin or face daily for 2-3 week bursts as needed. 60 g 6    hydrocortisone (WESTCORT) 0.2 %  external cream Apply twice daily for 2 weeks to the face, then weekends only for 2 weeks, repeat if psoriasis present 60 g 2    sildenafil (VIAGRA) 25 MG tablet Take 1 tablet (25 mg) by mouth daily as needed (intercourse) 10 tablet 3    triamcinolone (KENALOG) 0.1 % external ointment Apply topically 2 times daily      ustekinumab (STELARA) 90 MG/ML Inject 1 mL (90 mg) Subcutaneous every 3 months Hold for signs of infection, and seek medical attention. 1 mL 3    ustekinumab (STELARA) 90 MG/ML Inject 90 mg on day 0,day 30 and then every 90 days.Hold for signs of infection,and seek medical attention. 2 mL 0     No current facility-administered medications for this visit.      Past Medical History:   Patient Active Problem List   Diagnosis    Other psoriasis    Obesity    Essential hypertension with goal blood pressure less than 140/90    Obesity, Class I, BMI 30-34.9    Benign cyst of both testicles     Past Medical History:   Diagnosis Date    Cholecystitis     Essential hypertension, benign 01/01/2000    Hypertension     Obesity     Other psoriasis     since childhood    Tubular adenoma         CC No referring provider defined for this encounter. on close of this encounter.

## 2024-06-28 ENCOUNTER — OFFICE VISIT (OUTPATIENT)
Dept: DERMATOLOGY | Facility: CLINIC | Age: 54
End: 2024-06-28
Payer: COMMERCIAL

## 2024-06-28 ENCOUNTER — MYC MEDICAL ADVICE (OUTPATIENT)
Dept: DERMATOLOGY | Facility: CLINIC | Age: 54
End: 2024-06-28

## 2024-06-28 ENCOUNTER — TELEPHONE (OUTPATIENT)
Dept: DERMATOLOGY | Facility: CLINIC | Age: 54
End: 2024-06-28

## 2024-06-28 DIAGNOSIS — L40.0 PLAQUE PSORIASIS: ICD-10-CM

## 2024-06-28 DIAGNOSIS — L40.9 PSORIASIS: ICD-10-CM

## 2024-06-28 DIAGNOSIS — L29.9 ITCHING: ICD-10-CM

## 2024-06-28 DIAGNOSIS — L30.1 DYSHIDROTIC ECZEMA: ICD-10-CM

## 2024-06-28 DIAGNOSIS — L40.9 PSORIASIS: Primary | ICD-10-CM

## 2024-06-28 PROCEDURE — 99214 OFFICE O/P EST MOD 30 MIN: CPT | Performed by: DERMATOLOGY

## 2024-06-28 RX ORDER — HYDROCORTISONE VALERATE CREAM 2 MG/G
CREAM TOPICAL
Qty: 60 G | Refills: 2 | Status: SHIPPED | OUTPATIENT
Start: 2024-06-28 | End: 2024-07-05

## 2024-06-28 RX ORDER — CLOBETASOL PROPIONATE 0.5 MG/ML
SOLUTION TOPICAL
Qty: 150 ML | Refills: 3 | Status: SHIPPED | OUTPATIENT
Start: 2024-06-28 | End: 2024-06-28

## 2024-06-28 RX ORDER — DESONIDE 0.5 MG/G
OINTMENT TOPICAL
Qty: 60 G | Refills: 6 | Status: SHIPPED | OUTPATIENT
Start: 2024-06-28 | End: 2024-07-05

## 2024-06-28 RX ORDER — HYDROXYZINE HYDROCHLORIDE 25 MG/1
TABLET, FILM COATED ORAL
Qty: 360 TABLET | Refills: 1 | Status: SHIPPED | OUTPATIENT
Start: 2024-06-28

## 2024-06-28 RX ORDER — CLOBETASOL PROPIONATE 0.5 MG/G
OINTMENT TOPICAL
Qty: 60 G | Refills: 4 | Status: SHIPPED | OUTPATIENT
Start: 2024-06-28 | End: 2024-06-28

## 2024-06-28 RX ORDER — CALCIPOTRIENE 50 UG/G
OINTMENT TOPICAL
Qty: 240 G | Refills: 3 | Status: SHIPPED | OUTPATIENT
Start: 2024-06-28 | End: 2024-07-05

## 2024-06-28 RX ORDER — TRIAMCINOLONE ACETONIDE 1 MG/G
OINTMENT TOPICAL
Qty: 454 G | Refills: 6 | Status: SHIPPED | OUTPATIENT
Start: 2024-06-28 | End: 2024-07-05

## 2024-06-28 RX ORDER — CALCIPOTRIENE 50 UG/G
OINTMENT TOPICAL
Qty: 240 G | Refills: 3 | Status: SHIPPED | OUTPATIENT
Start: 2024-06-28 | End: 2024-06-28

## 2024-06-28 RX ORDER — HYDROCORTISONE VALERATE CREAM 2 MG/G
CREAM TOPICAL
Qty: 60 G | Refills: 2 | Status: SHIPPED | OUTPATIENT
Start: 2024-06-28 | End: 2024-06-28

## 2024-06-28 RX ORDER — DESONIDE 0.5 MG/G
OINTMENT TOPICAL
Qty: 60 G | Refills: 6 | Status: SHIPPED | OUTPATIENT
Start: 2024-06-28 | End: 2024-06-28

## 2024-06-28 RX ORDER — CLOBETASOL PROPIONATE 0.5 MG/ML
SOLUTION TOPICAL
Qty: 150 ML | Refills: 3 | Status: SHIPPED | OUTPATIENT
Start: 2024-06-28 | End: 2024-07-05

## 2024-06-28 RX ORDER — CLOBETASOL PROPIONATE 0.5 MG/G
OINTMENT TOPICAL
Qty: 60 G | Refills: 4 | Status: SHIPPED | OUTPATIENT
Start: 2024-06-28 | End: 2024-07-05

## 2024-06-28 RX ORDER — TRIAMCINOLONE ACETONIDE 1 MG/G
OINTMENT TOPICAL
Qty: 454 G | Refills: 6 | Status: SHIPPED | OUTPATIENT
Start: 2024-06-28 | End: 2024-06-28

## 2024-06-28 RX ORDER — TRIAMCINOLONE ACETONIDE 1 MG/G
OINTMENT TOPICAL 2 TIMES DAILY
COMMUNITY

## 2024-06-28 ASSESSMENT — PAIN SCALES - GENERAL: PAINLEVEL: NO PAIN (0)

## 2024-06-28 NOTE — LETTER
6/28/2024      To White  [address] [address] [place] [id number]      Dear Colleague,    Thank you for referring your patient, To White, to the [facility]. Please see a copy of my visit note below.      [facility] Dermatology Note  Encounter Date: Jun 28, 2024  Office Visit     Dermatology Problem List:  Last skin check: 6/30/23  1. Plaque psoriasis: Skyrizi  triamcinolone 0.1% ointment, desonide 0.05% ointment, calcipotriene 0.005% ointment, Clobetasol  - Past tx: Soriatane 25 mg daily stopped due to pyogenic granulomas 6/2018, Marci Gan(had elevated WBC and saw heme), otezla didn't work   2. Onycholysis of distal right toenails, May be in association with psoriasis.   3. Seborrheic Dermatitis  - Current Tx: clobetasol, ketoconazole 2% shampoo    ____________________________________________    Assessment & Plan:  # Plaque psoriasis - Flaring today. Pt reports Marci worked minimally after starting but has been ineffective since restarting.  Patches  on trunk and extremeties >5% TBSA today   - Continue clobetasol cream BID twice daily for up to 2 weeks. Pt likes triamcinolone on hand also for this.   - Continue Wesestcort cream BID x 2 weeks to the face, for up to 2 weeks for face  - Desonide as above for the groin  - Start Secukinumab 300mg (2 injections of 150mg) weekly for 5 weeks. Then, monthly. We reviewed the warnings for candida infection and IBD.   We reviewed this drug is indicated for moderate or severe plaque psoriasis for patients who are candidates for systemic or phototherapy. The patient will self inject after training. We reviewed that a greater frequency of Crohns and Ulcerative colitis has been associated with this drug, including exacerbations Other side effects include injection site reactions, URI, nausea, and tinea. There is also a risk of antibody development.  Side effects of treatment with biologic therapy were reviewed including  but not limited to immune suppression, increased susceptibility to infection, injection site and systemic reactions, and possible increased risk of lymphoma and other malignancy.  Discussed need to contact clinic if patient is ill or not feeling well as we may hold medication. Patient is up to date on vaccinations. Discussed that live vaccinations should not be given while on this medication and clinic should be contact prior to vaccinations.  Patient should also get yearly flu/covid, get hep B series  vaccination.   Reviewed risks of light including blister, burn.   - - No symptoms or history concerning for TB               No known TB exposure               No recent travel to TB endemic area              No chronic cough, hemoptysis, major weight changes or night sweats.              Not a healthcare worker              Not homeless and has not worked in a homeless shelter         Home UVB phototherapy unit  Unit type: full body  Diagnosis: severe psoriasis vulgaris  Greater than 5% BSA involvement.  Areas of involvement: trunk, extremities, genitals  Franks skin type: II  Frequency: three times per week  Once home unit received, patient should follow treatment guidelines for psoriasis listed in the Operations Manual that came with unit.  If refill is needed on device AND patient has been seen within the 3 months, I authorize 75 (3 months) treatments.        IS leaving [facility] this summer so his follow up with be his next TB screening and recommend hep b series         Follow-up: 6 months photos and phone, or earlier for new or changing lesions    Staff and Scribe:     Scribe Disclosure:   I, Jessica Menendez, am serving as a scribe to document services personally performed by Jacey Cervantes MD based on data collection and the provider's statements to me.     Provider Disclosure:   The documentation recorded by the scribe accurately reflects the services I personally performed and the decisions made by  me.    Jacey Cervantes MD    Department of Dermatology  [facility]: Phone: [phone], Fax:[phone]  [facility]: Phone: [phone], Fax: [phone]   ____________________________________________    CC: Psoriasis (Stelara has been ineffective - would like to discuss home UVB treatment)    HPI:  Mr. To White is a(n) 54 year old male who presents today as a return patient for psoriasis. He reports that Stelara has not been effective after restarting.  He would like to discuss starting UVB therapy at home.     Today, patient reports he wants off stelara and to try new drug    No illness. No hospitalizations. No cough. Feels good    Skyrizi also did work.       Patient is otherwise feeling well, without additional skin concerns.    Labs Reviewed:  Component      Latest Ref Rng 8/11/2023  7:27 AM   WBC      4.0 - 11.0 10e3/uL    RBC Count      4.40 - 5.90 10e6/uL    Hemoglobin      13.3 - 17.7 g/dL    Hematocrit      40.0 - 53.0 %    MCV      78 - 100 fL    MCH      26.5 - 33.0 pg    MCHC      31.5 - 36.5 g/dL    RDW      10.0 - 15.0 %    Platelet Count      150 - 450 10e3/uL    % Neutrophils      %    % Lymphocytes      %    % Monocytes      %    % Eosinophils      %    % Basophils      %    % Immature Granulocytes      %    NRBCs per 100 WBC      <1 /100    Absolute Neutrophils      1.6 - 8.3 10e3/uL    Absolute Lymphocytes      0.8 - 5.3 10e3/uL    Absolute Monocytes      0.0 - 1.3 10e3/uL    Absolute Eosinophils      0.0 - 0.7 10e3/uL    Absolute Basophils      0.0 - 0.2 10e3/uL    Absolute Immature Granulocytes      <=0.4 10e3/uL    Absolute NRBCs      10e3/uL    Sodium      136 - 145 mmol/L 137    Potassium      3.4 - 5.3 mmol/L 4.3    Chloride      98 - 107 mmol/L 102    Carbon Dioxide (CO2)      22 - 29 mmol/L 26    Anion Gap      7 - 15 mmol/L 9    Urea Nitrogen      6.0 - 20.0  mg/dL 11.0    Creatinine      0.67 - 1.17 mg/dL 1.04    Calcium      8.6 - 10.0 mg/dL 9.4    Glucose      70 - 99 mg/dL 102 (H)    Alkaline Phosphatase      40 - 129 U/L 108    AST      0 - 45 U/L 17    ALT      0 - 70 U/L 14    Protein Total      6.4 - 8.3 g/dL 7.3    Albumin      3.5 - 5.2 g/dL 4.2    Bilirubin Total      <=1.2 mg/dL 0.3    GFR Estimate      >60 mL/min/1.73m2 86    Quantiferon-TB Gold Plus Result      Negative     TB1 Ag minus Nil Value      IU/mL    TB2 Ag minus Nil Value      IU/mL    Mitogen minus Nil Result      IU/mL    Nil Result      IU/mL    Cholesterol      <200 mg/dL 187    Triglycerides      <150 mg/dL 128    HDL Cholesterol      >=40 mg/dL 44    LDL Cholesterol Calculated      <=100 mg/dL 117 (H)    Non HDL Cholesterol      <130 mg/dL 143 (H)    Quantiferon Nil Tube      IU/mL    Quantiferon TB1 Tube      IU/mL    Quantiferon TB2 Tube    QUANTIFERON MITOGEN      IU/mL    PSA Tumor Marker      0.00 - 3.50 ng/mL 0.42       Legend:  (H) High        Physical Exam:  Vitals: There were no vitals taken for this visit.  SKIN: Total skin excluding the undergarment areas was performed. The exam included the head/face, neck, both arms, chest, back, abdomen, both legs, digits and/or nails.   -   - There are erythematous well demarcated plaques with silvery micaceous scale on the trunk and extremities..   Dana Marquez CMA present   - No other lesions of concern on areas examined.     Medications:  Current Outpatient Medications   Medication Sig Dispense Refill     amLODIPine (NORVASC) 10 MG tablet Take 1 tablet (10 mg) by mouth daily 90 tablet 3     benazepril (LOTENSIN) 40 MG tablet Take 1 tablet (40 mg) by mouth daily 90 tablet 3     calcipotriene 0.005 % OINT Apply to the affected area twice a day Mon-Fri when rash is relatively calm. 240 g 3     clobetasol (TEMOVATE) 0.05 % external ointment Apply twice daily for 2 weeks to back. 60 g 4     clobetasol (TEMOVATE) 0.05 % external solution Apply on  scalp for up to 2 weeks in a row for flares 50 mL 3     desonide (DESOWEN) 0.05 % external ointment Apply to the affected area of the skin of groin or face daily for 2-3 week bursts as needed. 60 g 6     hydrocortisone (WESTCORT) 0.2 % external cream Apply twice daily for 2 weeks to the face, then weekends only for 2 weeks, repeat if psoriasis present 60 g 2     sildenafil (VIAGRA) 25 MG tablet Take 1 tablet (25 mg) by mouth daily as needed (intercourse) 10 tablet 3     triamcinolone (KENALOG) 0.1 % external ointment Apply topically 2 times daily       ustekinumab (STELARA) 90 MG/ML Inject 1 mL (90 mg) Subcutaneous every 3 months Hold for signs of infection, and seek medical attention. 1 mL 3     ustekinumab (STELARA) 90 MG/ML Inject 90 mg on day 0,day 30 and then every 90 days.Hold for signs of infection,and seek medical attention. 2 mL 0     No current facility-administered medications for this visit.      Past Medical History:   Patient Active Problem List   Diagnosis     Other psoriasis     Obesity     Essential hypertension with goal blood pressure less than 140/90     Obesity, Class I, BMI 30-34.9     Benign cyst of both testicles     Past Medical History:   Diagnosis Date     Cholecystitis      Essential hypertension, benign 01/01/2000     Hypertension      Obesity      Other psoriasis     since childhood     Tubular adenoma         CC No referring provider defined for this encounter. on close of this encounter.      Again, thank you for allowing me to participate in the care of your patient.        Sincerely,        Jacey Cervantes MD

## 2024-06-28 NOTE — PATIENT INSTRUCTIONS
Start your Hep B vaccine series  Get next Shingles vaccine series  Recommend Covid booster    You will get a phone call from [employer] regarding home UVB unit.  [facility] will work on getting Cosentyx.

## 2024-06-28 NOTE — TELEPHONE ENCOUNTER
PA Initiation    Medication: COSENTYX SENSOREADY (300 MG) 150 MG/ML SC SOAJ  Insurance Company: [employer] - Phone [phone] Fax [phone]  [facility] [place]  Filling Pharmacy Phone:    Filling Pharmacy Fax:    Start Date: 6/28/2024    Key: [id number]

## 2024-06-28 NOTE — NURSING NOTE
To White's chief complaint for this visit includes:  Chief Complaint   Patient presents with    Psoriasis     Stelara has been ineffective -      PCP: Christo Richard    Referring Provider:  Referred Self, MD  No address on file    There were no vitals taken for this visit.  No Pain (0)        Allergies   Allergen Reactions    No Known Allergies          Do you need any medication refills at today's visit? No    Dana Marquez CMA

## 2024-07-01 NOTE — TELEPHONE ENCOUNTER
PRIOR AUTHORIZATION DENIED    Medication: COSENTYX SENSOREADY (300 MG) 150 MG/ML SC SOAJ  Insurance Company: [employer] - Phone [phone] Fax [phone]  Denial Date: 6/29/2024  Denial Reason(s):   Appeal Information: [phone]  Patient Notified:     hayder

## 2024-07-02 ENCOUNTER — MYC MEDICAL ADVICE (OUTPATIENT)
Dept: DERMATOLOGY | Facility: CLINIC | Age: 54
End: 2024-07-02
Payer: COMMERCIAL

## 2024-07-03 ENCOUNTER — TELEPHONE (OUTPATIENT)
Dept: DERMATOLOGY | Facility: CLINIC | Age: 54
End: 2024-07-03
Payer: COMMERCIAL

## 2024-07-03 ENCOUNTER — MYC MEDICAL ADVICE (OUTPATIENT)
Dept: DERMATOLOGY | Facility: CLINIC | Age: 54
End: 2024-07-03
Payer: COMMERCIAL

## 2024-07-03 NOTE — TELEPHONE ENCOUNTER
I left a VM for [employer] to call the clinic.     We received a fax from [facility] stating that the wrong sub id is indicated on the pre-determination request form. The application needs to be resubmitted by [employer].

## 2024-07-05 RX ORDER — CALCIPOTRIENE 50 UG/G
OINTMENT TOPICAL
Qty: 240 G | Refills: 3 | Status: SHIPPED | OUTPATIENT
Start: 2024-07-05

## 2024-07-05 RX ORDER — CLOBETASOL PROPIONATE 0.5 MG/G
OINTMENT TOPICAL
Qty: 60 G | Refills: 4 | Status: SHIPPED | OUTPATIENT
Start: 2024-07-05

## 2024-07-05 RX ORDER — HYDROCORTISONE VALERATE CREAM 2 MG/G
CREAM TOPICAL
Qty: 60 G | Refills: 2 | Status: SHIPPED | OUTPATIENT
Start: 2024-07-05

## 2024-07-05 RX ORDER — TRIAMCINOLONE ACETONIDE 1 MG/G
OINTMENT TOPICAL
Qty: 454 G | Refills: 6 | Status: SHIPPED | OUTPATIENT
Start: 2024-07-05

## 2024-07-05 RX ORDER — DESONIDE 0.5 MG/G
OINTMENT TOPICAL
Qty: 60 G | Refills: 6 | Status: SHIPPED | OUTPATIENT
Start: 2024-07-05

## 2024-07-05 RX ORDER — CLOBETASOL PROPIONATE 0.5 MG/ML
SOLUTION TOPICAL
Qty: 150 ML | Refills: 3 | Status: SHIPPED | OUTPATIENT
Start: 2024-07-05 | End: 2024-07-12

## 2024-07-05 NOTE — TELEPHONE ENCOUNTER
Medications reordered due to being sent to incorrect pharmacy.     New orders sent to [facility] Mail service instead.     My Gutierrez RN on 7/5/2024 at 10:49 AM

## 2024-07-09 NOTE — TELEPHONE ENCOUNTER
I called and spoke to a representative at [employer]. I informed them of the fax that we received from [facility] regarding the error on the pre determination request form. I faxed this information to [employer] so they can resubmit the application for home phototherapy.     Fax confirmation received via [employer].     Maggie Hendricks RN on 7/9/2024 at 10:24 AM

## 2024-07-10 DIAGNOSIS — L40.9 PSORIASIS: ICD-10-CM

## 2024-07-11 NOTE — TELEPHONE ENCOUNTER
LVD 6/28/2024  [facility] Jacey Valentin MD  Dermatology   clobetasol (TEMOVATE) 0.05 % external [id number] mL3 refills 7/5/2024   Sig: Apply on scalp for up to 2 weeks in a row for flare    [facility] AT PORTAL TO REGISTERED [facility] SITES    The frequency is missing on the original prescription for (TEMOVATE 0.05%) dated (07/05/24). Please respond with intended frequency or comment to Pharmacy.

## 2024-07-12 ENCOUNTER — PATIENT OUTREACH (OUTPATIENT)
Dept: CARE COORDINATION | Facility: CLINIC | Age: 54
End: 2024-07-12
Payer: COMMERCIAL

## 2024-07-12 ENCOUNTER — TELEPHONE (OUTPATIENT)
Dept: DERMATOLOGY | Facility: CLINIC | Age: 54
End: 2024-07-12
Payer: COMMERCIAL

## 2024-07-12 RX ORDER — CLOBETASOL PROPIONATE 0.5 MG/ML
SOLUTION TOPICAL
Qty: 150 ML | Refills: 3 | Status: SHIPPED | OUTPATIENT
Start: 2024-07-12

## 2024-07-12 NOTE — TELEPHONE ENCOUNTER
[facility] Call Center    Phone Message    May a detailed message be left on voicemail: yes     Reason for Call: Medication Question or concern regarding medication   Prescription Clarification  Name of Medication: clobetasol (TEMOVATE) 0.05 % external solution   Prescribing Provider: Billy   Ph[facility]   [facility] ONE [facility] AT PORTAL TO REGISTERED [facility] SITES      What on the order needs clarification?  Rx is missing frequency of use for patiet  Phone # [phone]  Ref # [id number]      Action Taken: Message routed to:  Adult Clinics: Dermatology p [id number]    Travel Screening: Not Applicable     Date of Service:

## 2024-07-22 NOTE — TELEPHONE ENCOUNTER
I called the patient. His psoriasis is th same. . I will copy Perri to also review the chart and reviewed side effect. I reivewed the following  Never has had malignancy or blood clot  Start ixekizumab(taltz) 160mg(two 80mg injections) SQ at Week 0, followed by 80mg at weeks 2,4,6,8,10,12, then 80mg every 4 weeks. We reviewed this drug is indicated for moderate or severe plaque psoriasis for patients who are candidates for systemic or phototherapy. The patient will self inject after training. We reviewed that a greater frequency of Crohns and Ulcerative colitis has been associated with this drug, including exacerbations. Other side effects include injection site reactions, URI, nausea, and tinea. There is also a risk of antibody development.  Side effects of treatment with biologic therapy were reviewed including but not limited to immune suppression, increased susceptibility to infection, injection site and systemic reactions.  Discussed need to contact clinic if patient is ill or not feeling well as we may hold medication. Patient is up to date on vaccinations. Discussed that live vaccinations should not be given while on this medication and clinic should be contact prior to vaccinations. Lab monitoring issues including CBC and liver were also reviewed.   Patient should also get yearly flu vaccination covid.

## 2024-07-22 NOTE — TELEPHONE ENCOUNTER
MTM placed orders for Taltz as detailed below by Dr Billy March, PharmD, BCPPS  Medication Therapy Management Pharmacist  [facility]

## 2024-07-23 ENCOUNTER — TELEPHONE (OUTPATIENT)
Dept: DERMATOLOGY | Facility: CLINIC | Age: 54
End: 2024-07-23
Payer: COMMERCIAL

## 2024-07-23 DIAGNOSIS — I10 ESSENTIAL HYPERTENSION WITH GOAL BLOOD PRESSURE LESS THAN 140/90: ICD-10-CM

## 2024-07-23 RX ORDER — BENAZEPRIL HYDROCHLORIDE 40 MG/1
40 TABLET ORAL DAILY
Qty: 90 TABLET | Refills: 0 | Status: SHIPPED | OUTPATIENT
Start: 2024-07-23 | End: 2024-09-27

## 2024-07-23 RX ORDER — AMLODIPINE BESYLATE 10 MG/1
10 TABLET ORAL DAILY
Qty: 90 TABLET | Refills: 0 | Status: SHIPPED | OUTPATIENT
Start: 2024-07-23 | End: 2024-09-27

## 2024-07-30 NOTE — TELEPHONE ENCOUNTER
Prior Authorization Approval    Medication: TALTZ 80 MG/ML SC SOAJ  Authorization Effective Date: 7/26/2024  Authorization Expiration Date: 7/26/2025  Approved Dose/Quantity: 3 for 28 days  Reference #: Key: [id number]   Insurance Company: [employer] - Phone [phone] Fax [phone]  Expected CoPay: $  not available  CoPay Card Available: No    Financial Assistance Needed:   [facility]  Pharmacy Notified: yes  Patient Notified: yes

## 2024-08-01 ENCOUNTER — TELEPHONE (OUTPATIENT)
Dept: DERMATOLOGY | Facility: CLINIC | Age: 54
End: 2024-08-01
Payer: COMMERCIAL

## 2024-08-01 NOTE — TELEPHONE ENCOUNTER
[facility] called [facility] and provided order clarification.  No further action is needed.    Perri Marhc, PharmD, BCPPS  Medication Therapy Management Pharmacist  [facility]

## 2024-08-01 NOTE — TELEPHONE ENCOUNTER
Clarification request for Taltz Pen (3-siomara) 80mg[facility]ml [facility] [address] [facility] in [place], [place] ask to please clarify directions/ qty mismatch. Please indicate desired; directions, quantity, refills, days supply.   Account: [id number]  Tel: [phone]  Fax: [phone]    Rudi Song on 8/1/2024 at 8:44 AM

## 2024-09-17 ENCOUNTER — MYC MEDICAL ADVICE (OUTPATIENT)
Dept: DERMATOLOGY | Facility: CLINIC | Age: 54
End: 2024-09-17
Payer: COMMERCIAL

## 2024-09-27 ENCOUNTER — OFFICE VISIT (OUTPATIENT)
Dept: FAMILY MEDICINE | Facility: CLINIC | Age: 54
End: 2024-09-27
Payer: COMMERCIAL

## 2024-09-27 VITALS
BODY MASS INDEX: 36.09 KG/M2 | RESPIRATION RATE: 16 BRPM | WEIGHT: 272.3 LBS | HEART RATE: 84 BPM | DIASTOLIC BLOOD PRESSURE: 84 MMHG | HEIGHT: 73 IN | OXYGEN SATURATION: 97 % | TEMPERATURE: 97.8 F | SYSTOLIC BLOOD PRESSURE: 132 MMHG

## 2024-09-27 DIAGNOSIS — Z00.00 ROUTINE GENERAL MEDICAL EXAMINATION AT A HEALTH CARE FACILITY: Primary | ICD-10-CM

## 2024-09-27 DIAGNOSIS — L40.9 PSORIASIS: ICD-10-CM

## 2024-09-27 DIAGNOSIS — Z12.5 SCREENING FOR PROSTATE CANCER: ICD-10-CM

## 2024-09-27 DIAGNOSIS — L40.8 OTHER PSORIASIS: ICD-10-CM

## 2024-09-27 DIAGNOSIS — Z51.81 MEDICATION MONITORING ENCOUNTER: ICD-10-CM

## 2024-09-27 DIAGNOSIS — N52.9 ERECTILE DYSFUNCTION, UNSPECIFIED ERECTILE DYSFUNCTION TYPE: ICD-10-CM

## 2024-09-27 DIAGNOSIS — I10 ESSENTIAL HYPERTENSION WITH GOAL BLOOD PRESSURE LESS THAN 140/90: ICD-10-CM

## 2024-09-27 DIAGNOSIS — E78.2 MIXED HYPERLIPIDEMIA: ICD-10-CM

## 2024-09-27 DIAGNOSIS — E66.01 CLASS 2 SEVERE OBESITY DUE TO EXCESS CALORIES WITH SERIOUS COMORBIDITY AND BODY MASS INDEX (BMI) OF 35.0 TO 35.9 IN ADULT (H): ICD-10-CM

## 2024-09-27 DIAGNOSIS — N44.2 BENIGN CYST OF BOTH TESTICLES: ICD-10-CM

## 2024-09-27 DIAGNOSIS — D36.9 TUBULAR ADENOMA: ICD-10-CM

## 2024-09-27 DIAGNOSIS — E66.812 CLASS 2 SEVERE OBESITY DUE TO EXCESS CALORIES WITH SERIOUS COMORBIDITY AND BODY MASS INDEX (BMI) OF 35.0 TO 35.9 IN ADULT (H): ICD-10-CM

## 2024-09-27 LAB
ALBUMIN SERPL BCG-MCNC: 4.3 G/DL (ref 3.5–5.2)
ALP SERPL-CCNC: 96 U/L (ref 40–150)
ALT SERPL W P-5'-P-CCNC: 17 U/L (ref 0–70)
ANION GAP SERPL CALCULATED.3IONS-SCNC: 10 MMOL/L (ref 7–15)
AST SERPL W P-5'-P-CCNC: 19 U/L (ref 0–45)
BASOPHILS # BLD AUTO: 0.1 10E3/UL (ref 0–0.2)
BASOPHILS NFR BLD AUTO: 1 %
BILIRUB SERPL-MCNC: 0.5 MG/DL
BUN SERPL-MCNC: 10.9 MG/DL (ref 6–20)
CALCIUM SERPL-MCNC: 9.4 MG/DL (ref 8.8–10.4)
CHLORIDE SERPL-SCNC: 102 MMOL/L (ref 98–107)
CHOLEST SERPL-MCNC: 191 MG/DL
CREAT SERPL-MCNC: 1.08 MG/DL (ref 0.67–1.17)
EGFRCR SERPLBLD CKD-EPI 2021: 82 ML/MIN/1.73M2
EOSINOPHIL # BLD AUTO: 0.5 10E3/UL (ref 0–0.7)
EOSINOPHIL NFR BLD AUTO: 5 %
ERYTHROCYTE [DISTWIDTH] IN BLOOD BY AUTOMATED COUNT: 13.3 % (ref 10–15)
FASTING STATUS PATIENT QL REPORTED: YES
FASTING STATUS PATIENT QL REPORTED: YES
GLUCOSE SERPL-MCNC: 88 MG/DL (ref 70–99)
HCO3 SERPL-SCNC: 26 MMOL/L (ref 22–29)
HCT VFR BLD AUTO: 42.7 % (ref 40–53)
HDLC SERPL-MCNC: 45 MG/DL
HGB BLD-MCNC: 14.7 G/DL (ref 13.3–17.7)
IMM GRANULOCYTES # BLD: 0 10E3/UL
IMM GRANULOCYTES NFR BLD: 0 %
LDLC SERPL CALC-MCNC: 121 MG/DL
LYMPHOCYTES # BLD AUTO: 4.4 10E3/UL (ref 0.8–5.3)
LYMPHOCYTES NFR BLD AUTO: 42 %
MCH RBC QN AUTO: 30.8 PG (ref 26.5–33)
MCHC RBC AUTO-ENTMCNC: 34.4 G/DL (ref 31.5–36.5)
MCV RBC AUTO: 90 FL (ref 78–100)
MONOCYTES # BLD AUTO: 0.7 10E3/UL (ref 0–1.3)
MONOCYTES NFR BLD AUTO: 7 %
NEUTROPHILS # BLD AUTO: 4.6 10E3/UL (ref 1.6–8.3)
NEUTROPHILS NFR BLD AUTO: 44 %
NONHDLC SERPL-MCNC: 146 MG/DL
NRBC # BLD AUTO: 0 10E3/UL
NRBC BLD AUTO-RTO: 0 /100
PLATELET # BLD AUTO: 358 10E3/UL (ref 150–450)
POTASSIUM SERPL-SCNC: 4 MMOL/L (ref 3.4–5.3)
PROT SERPL-MCNC: 7.9 G/DL (ref 6.4–8.3)
PSA SERPL DL<=0.01 NG/ML-MCNC: 0.43 NG/ML (ref 0–3.5)
RBC # BLD AUTO: 4.77 10E6/UL (ref 4.4–5.9)
SODIUM SERPL-SCNC: 138 MMOL/L (ref 135–145)
TRIGL SERPL-MCNC: 126 MG/DL
WBC # BLD AUTO: 10.5 10E3/UL (ref 4–11)

## 2024-09-27 PROCEDURE — 80053 COMPREHEN METABOLIC PANEL: CPT | Performed by: STUDENT IN AN ORGANIZED HEALTH CARE EDUCATION/TRAINING PROGRAM

## 2024-09-27 PROCEDURE — 86481 TB AG RESPONSE T-CELL SUSP: CPT | Performed by: STUDENT IN AN ORGANIZED HEALTH CARE EDUCATION/TRAINING PROGRAM

## 2024-09-27 PROCEDURE — 85025 COMPLETE CBC W/AUTO DIFF WBC: CPT | Performed by: STUDENT IN AN ORGANIZED HEALTH CARE EDUCATION/TRAINING PROGRAM

## 2024-09-27 PROCEDURE — 99396 PREV VISIT EST AGE 40-64: CPT | Performed by: STUDENT IN AN ORGANIZED HEALTH CARE EDUCATION/TRAINING PROGRAM

## 2024-09-27 PROCEDURE — G0103 PSA SCREENING: HCPCS | Performed by: STUDENT IN AN ORGANIZED HEALTH CARE EDUCATION/TRAINING PROGRAM

## 2024-09-27 PROCEDURE — 80061 LIPID PANEL: CPT | Performed by: STUDENT IN AN ORGANIZED HEALTH CARE EDUCATION/TRAINING PROGRAM

## 2024-09-27 PROCEDURE — 36415 COLL VENOUS BLD VENIPUNCTURE: CPT | Performed by: STUDENT IN AN ORGANIZED HEALTH CARE EDUCATION/TRAINING PROGRAM

## 2024-09-27 RX ORDER — AMLODIPINE BESYLATE 10 MG/1
10 TABLET ORAL DAILY
Qty: 90 TABLET | Refills: 4 | Status: SHIPPED | OUTPATIENT
Start: 2024-09-27

## 2024-09-27 RX ORDER — SILDENAFIL 100 MG/1
50-100 TABLET, FILM COATED ORAL DAILY PRN
Qty: 30 TABLET | Refills: 11 | Status: SHIPPED | OUTPATIENT
Start: 2024-09-27

## 2024-09-27 RX ORDER — BENAZEPRIL HYDROCHLORIDE 40 MG/1
40 TABLET ORAL DAILY
Qty: 90 TABLET | Refills: 4 | Status: SHIPPED | OUTPATIENT
Start: 2024-09-27

## 2024-09-27 ASSESSMENT — PAIN SCALES - GENERAL: PAINLEVEL: NO PAIN (0)

## 2024-09-27 NOTE — PATIENT INSTRUCTIONS
Patient Education   Preventive Care Advice   This is general advice given by our system to help you stay healthy. However, your care team may have specific advice just for you. Please talk to your care team about your preventive care needs.  Nutrition  Eat 5 or more servings of fruits and vegetables each day.  Try wheat bread, brown rice and whole grain pasta (instead of white bread, rice, and pasta).  Get enough calcium and vitamin D. Check the label on foods and aim for 100% of the RDA (recommended daily allowance).  Lifestyle  Exercise at least 150 minutes each week  (30 minutes a day, 5 days a week).  Do muscle strengthening activities 2 days a week. These help control your weight and prevent disease.  No smoking.  Wear sunscreen to prevent skin cancer.  Have a dental exam and cleaning every 6 months.  Yearly exams  See your health care team every year to talk about:  Any changes in your health.  Any medicines your care team has prescribed.  Preventive care, family planning, and ways to prevent chronic diseases.  Shots (vaccines)   HPV shots (up to age 26), if you've never had them before.  Hepatitis B shots (up to age 59), if you've never had them before.  COVID-19 shot: Get this shot when it's due.  Flu shot: Get a flu shot every year.  Tetanus shot: Get a tetanus shot every 10 years.  Pneumococcal, hepatitis A, and RSV shots: Ask your care team if you need these based on your risk.  Shingles shot (for age 50 and up)  General health tests  Diabetes screening:  Starting at age 35, Get screened for diabetes at least every 3 years.  If you are younger than age 35, ask your care team if you should be screened for diabetes.  Cholesterol test: At age 39, start having a cholesterol test every 5 years, or more often if advised.  Bone density scan (DEXA): At age 50, ask your care team if you should have this scan for osteoporosis (brittle bones).  Hepatitis C: Get tested at least once in your life.  STIs (sexually  transmitted infections)  Before age 24: Ask your care team if you should be screened for STIs.  After age 24: Get screened for STIs if you're at risk. You are at risk for STIs (including HIV) if:  You are sexually active with more than one person.  You don't use condoms every time.  You or a partner was diagnosed with a sexually transmitted infection.  If you are at risk for HIV, ask about PrEP medicine to prevent HIV.  Get tested for HIV at least once in your life, whether you are at risk for HIV or not.  Cancer screening tests  Cervical cancer screening: If you have a cervix, begin getting regular cervical cancer screening tests starting at age 21.  Breast cancer scan (mammogram): If you've ever had breasts, begin having regular mammograms starting at age 40. This is a scan to check for breast cancer.  Colon cancer screening: It is important to start screening for colon cancer at age 45.  Have a colonoscopy test every 10 years (or more often if you're at risk) Or, ask your provider about stool tests like a FIT test every year or Cologuard test every 3 years.  To learn more about your testing options, visit:   .  For help making a decision, visit:   https://bit.ly/ho09141.  Prostate cancer screening test: If you have a prostate, ask your care team if a prostate cancer screening test (PSA) at age 55 is right for you.  Lung cancer screening: If you are a current or former smoker ages 50 to 80, ask your care team if ongoing lung cancer screenings are right for you.  For informational purposes only. Not to replace the advice of your health care provider. Copyright   2023 [employer]. All rights reserved. Clinically reviewed by the [facility] Transitions Program. [employer] [id number] - REV 01/24.

## 2024-09-27 NOTE — PROGRESS NOTES
"Preventive Care Visit  [facility]  Christo Richard MD, Family Medicine  Sep 27, 2024      Assessment & Plan   Problem List Items Addressed This Visit          Digestive    Class 2 severe obesity due to excess calories with serious comorbidity in adult (H)       Circulatory    Essential hypertension with goal blood pressure less than 140/90    Relevant Medications    amLODIPine (NORVASC) 10 MG tablet    benazepril (LOTENSIN) 40 MG tablet    Other Relevant Orders    Comprehensive metabolic panel (BMP + Alb, Alk Phos, ALT, AST, Total. Bili, TP)       Musculoskeletal and Integumentary    Other psoriasis       Urinary    Benign cyst of both testicles     Other Visit Diagnoses       Routine general medical examination at a health care facility    -  Primary    Tubular adenoma        Erectile dysfunction, unspecified erectile dysfunction type        Relevant Medications    sildenafil (VIAGRA) 100 MG tablet    Mixed hyperlipidemia        Relevant Orders    Lipid panel reflex to direct LDL Fasting    Screening for prostate cancer        Relevant Orders    PSA, screen    Psoriasis        Medication monitoring encounter               Age-appropriate screening and immunization discussed.  Blood pressure stable and repeat labs today.  Cardiovascular risk reviewed.  Repeat PSA.  Will increase sildenafil dose.  Continue to follow with dermatology    Patient has been advised of split billing requirements and indicates understanding: Yes       BMI  Estimated body mass index is 35.51 kg/m  as calculated from the following:    Height as of this encounter: 1.865 m (6' 1.43\").    Weight as of this encounter: 123.5 kg (272 lb 4.8 oz).   Weight management plan: Discussed healthy diet and exercise guidelines    Counseling  Appropriate preventive services were addressed with this patient via screening, questionnaire, or discussion as appropriate for fall prevention, nutrition, physical activity, " Tobacco-use cessation, social engagement, weight loss and cognition.  Checklist reviewing preventive services available has been given to the patient.  Reviewed patient's diet, addressing concerns and/or questions.   He is at risk for lack of exercise and has been provided with information to increase physical activity for the benefit of his well-being.         Jaymie Ariza is a 54 year old, presenting for the following:  Physical        9/27/2024    12:40 PM   Additional Questions   Roomed by [facility] Care Directive  Patient does not have a Health Care Directive or Living Will: Discussed advance care planning with patient; however, patient declined at this time.    HPI            9/23/2024   General Health   How would you rate your overall physical health? Good   Feel stress (tense, anxious, or unable to sleep) To some extent      (!) STRESS CONCERN      9/23/2024   Nutrition   Three or more servings of calcium each day? (!) I DON'T KNOW   Diet: Regular (no restrictions)   How many servings of fruit and vegetables per day? (!) 0-1   How many sweetened beverages each day? 0-1            9/23/2024   Exercise   Days per week of moderate/strenous exercise 2 days   Average minutes spent exercising at this level 20 min      (!) EXERCISE CONCERN      9/23/2024   Social Factors   Frequency of gathering with friends or relatives More than three times a week   Worry food won't last until get money to buy more No   Food not last or not have enough money for food? No   Do you have housing? (Housing is defined as stable permanent housing and does not include staying ouside in a car, in a tent, in an abandoned building, in an overnight shelter, or couch-surfing.) Yes   Are you worried about losing your housing? No   Lack of transportation? No   Unable to get utilities (heat,electricity)? No            9/23/2024   Fall Risk   Fallen 2 or more times in the past year? No   Trouble with walking or balance? No              2024   Dental   Dentist two times every year? Yes                 Today's PHQ-2 Score:       2024    12:45 PM   PHQ-2 (  [employer])   Q1: Little interest or pleasure in doing things 0   Q2: Feeling down, depressed or hopeless 0   PHQ-2 Score 0         2024   Substance Use   Alcohol more than 3/day or more than 7/wk No   Do you use any other substances recreationally? No        Social History     Tobacco Use    Smoking status: Never    Smokeless tobacco: Never   Vaping Use    Vaping status: Never Used   Substance Use Topics    Alcohol use: Yes     Comment: Rarely    Drug use: No           2024   STI Screening   New sexual partner(s) since last STI/HIV test? No      ASCVD Risk   The 10-year ASCVD risk score (Fabian ANSARI, et al., 2019) is: 5.9%    Values used to calculate the score:      Age: 54 years      Sex: Male      Is Non- : No      Diabetic: No      Tobacco smoker: No      Systolic Blood Pressure: 132 mmHg      Is BP treated: Yes      HDL Cholesterol: 44 mg/dL      Total Cholesterol: 187 mg/dL    Fracture Risk Assessment Tool  Link to Frax Calculator  Use the information below to complete the Frax calculator  : 1970  Sex: male  Weight (kg): 123.5 kg (actual weight)  Height (cm): 186.5 cm  Previous Fragility Fracture:  No  History of parent with fractured hip:  No  Current Smoking:  No  Patient has been on glucocorticoids for more than 3 months (5mg/day or more): No  Rheumatoid Arthritis on Problem List:  No  Secondary Osteoporosis on Problem List:  No  Consumes 3 or more units of alcohol per day: No  Femoral Neck BMD (g/cm2)           Reviewed and updated as needed this visit by Provider                    Past Medical History:   Diagnosis Date    Cholecystitis     Essential hypertension, benign 2000    Hypertension     Obesity     Other psoriasis     since childhood    Tubular adenoma      Past Surgical History:   Procedure Laterality Date  "   CHOLECYSTECTOMY      COLONOSCOPY      COLONOSCOPY N/A 2/17/2023    Procedure: COLONOSCOPY, WITH POLYPECTOMIES by snare and forcep;  Surgeon: Kulwinder Hollins MD;  Location:  GI    LAPAROSCOPIC CHOLECYSTECTOMY  07/03/2012    Procedure: LAPAROSCOPIC CHOLECYSTECTOMY;  Laparoscopic Cholecystectomy;  Surgeon: Kulwinder Hollins MD;  Location:  OR    NO HISTORY OF SURGERY           Review of Systems  Constitutional, HEENT, cardiovascular, pulmonary, GI, , musculoskeletal, neuro, skin, endocrine and psych systems are negative, except as otherwise noted.     Objective    Exam  /84 (BP Location: Right arm, Patient Position: Sitting, Cuff Size: Adult Large)   Pulse 84   Temp 97.8  F (36.6  C) (Temporal)   Resp 16   Ht 1.865 m (6' 1.43\")   Wt 123.5 kg (272 lb 4.8 oz)   SpO2 97%   BMI 35.51 kg/m     Estimated body mass index is 35.51 kg/m  as calculated from the following:    Height as of this encounter: 1.865 m (6' 1.43\").    Weight as of this encounter: 123.5 kg (272 lb 4.8 oz).    Physical Exam  GENERAL: alert and no distress  EYES: Eyes grossly normal to inspection, PERRL and conjunctivae and sclerae normal  HENT: ear canals and TM's normal, nose and mouth without ulcers or lesions  NECK: no adenopathy, no asymmetry, masses, or scars  RESP: lungs clear to auscultation - no rales, rhonchi or wheezes  CV: regular rate and rhythm, normal S1 S2, no S3 or S4, no murmur, click or rub, no peripheral edema  ABDOMEN: soft, nontender, no hepatosplenomegaly, no masses and bowel sounds normal  MS: no gross musculoskeletal defects noted, no edema  SKIN: no suspicious lesions or rashes  NEURO: Normal strength and tone, mentation intact and speech normal  PSYCH: mentation appears normal, affect normal/bright      Prior to immunization administration, verified patients identity using patient s name and date of birth. Please see Immunization Activity for additional information.     Screening Questionnaire for Adult " Immunization    Are you sick today?   No   Do you have allergies to medications, food, a vaccine component or latex?   No   Have you ever had a serious reaction after receiving a vaccination?   No   Do you have a long-term health problem with heart, lung, kidney, or metabolic disease (e.g., diabetes), asthma, a blood disorder, no spleen, complement component deficiency, a cochlear implant, or a spinal fluid leak?  Are you on long-term aspirin therapy?   No   Do you have cancer, leukemia, HIV/AIDS, or any other immune system problem?   No   Do you have a parent, brother, or sister with an immune system problem?   No   In the past 3 months, have you taken medications that affect  your immune system, such as prednisone, other steroids, or anticancer drugs; drugs for the treatment of rheumatoid arthritis, Crohn s disease, or psoriasis; or have you had radiation treatments?   No   Have you had a seizure, or a brain or other nervous system problem?   No   During the past year, have you received a transfusion of blood or blood    products, or been given immune (gamma) globulin or antiviral drug?   No   For women: Are you pregnant or is there a chance you could become       pregnant during the next month?   No   Have you received any vaccinations in the past 4 weeks?  Flu     Immunization questionnaire was positive for at least one answer.  Notified Provider.      Patient instructed to remain in clinic for 15 minutes afterwards, and to report any adverse reactions.     Screening performed by Krystal Escalera on 9/27/2024 at 12:46 PM.       Signed Electronically by: Christo Richard MD

## 2024-09-28 LAB
GAMMA INTERFERON BACKGROUND BLD IA-ACNC: 0.02 IU/ML
M TB IFN-G BLD-IMP: NEGATIVE
M TB IFN-G CD4+ BCKGRND COR BLD-ACNC: 9.98 IU/ML
MITOGEN IGNF BCKGRD COR BLD-ACNC: 0.01 IU/ML
MITOGEN IGNF BCKGRD COR BLD-ACNC: 0.02 IU/ML
QUANTIFERON MITOGEN: 10 IU/ML
QUANTIFERON NIL TUBE: 0.02 IU/ML
QUANTIFERON TB1 TUBE: 0.03 IU/ML
QUANTIFERON TB2 TUBE: 0.04

## 2024-10-22 ENCOUNTER — TELEPHONE (OUTPATIENT)
Dept: DERMATOLOGY | Facility: CLINIC | Age: 54
End: 2024-10-22
Payer: COMMERCIAL

## 2024-10-22 NOTE — TELEPHONE ENCOUNTER
PA started for Taltz 80mg/ml from [employer] on [address] in [place], [place]. To submit;  Key: [id number]  Tel: [phone]  Fax: [phone]    Rudi Song on 10/22/2024 at 9:17 AM

## 2024-10-29 ENCOUNTER — TELEPHONE (OUTPATIENT)
Dept: DERMATOLOGY | Facility: CLINIC | Age: 54
End: 2024-10-29
Payer: COMMERCIAL

## 2024-10-29 NOTE — TELEPHONE ENCOUNTER
Pt read [employer] message and will call the clinic with any questions or concerns.     Pricilla Yung RN on 10/29/2024 at 8:10 AM        Quantiferon TB Gold Plus  Order: [id number] - Part of Panel Order [id number]  Status: Final result       Visible to patient: Yes (seen)       Dx: Psoriasis    Specimen Information: Peripheral Blood  1 Result Note       1 Patient Communication           Component  Ref Range & Units 1 mo ago 1 yr ago 2 yr ago 3 yr ago 4 yr ago 5 yr ago 6 yr ago  Quantiferon-TB Gold Plus  Negative Negative Negative CM Negative CM   Negative R, CM Negative R, CM  Comment: No interferon gamma response to M.tuberculosis antigens was detected. Infection with M.tuberculosis is unlikely, however a single negative result does not exclude infection. In patients at high risk for infection, a second test should be considered in accordance with the 2017 ATS/IDSA/CDC Clinical Pract  ice Guidelines for Diagnosis of Tuberculosis in Adults and Children  TB1 Ag minus Nil Value  IU/mL 0.01 -0.22 0.01   0.00 0.00  TB2 Ag minus Nil Value  IU/mL 0.02 -0.49 0.00   0.01 0.00  Mitogen minus Nil Result  IU/mL 9.98 9.46 9.99   >10.00 >10.00  Nil Result  IU/mL 0.02 0.54 0.01 0.01 0.03 0.03 0.08  Resulting Agency SCORE SCORE SCORE [facility]          Specimen Collected: 09/27/24  1:22 PM Last Resulted: 09/28/24 11:38 PM      Jamin Ariza,  See message below from Dr Cervantes regarding lab results:  Dyllan RomoRN  Written by Pricilla Yung RN on 10/28/2024  8:13 AM CDT  Seen by patient To White on 10/28/2024  8:13 AM

## 2024-12-15 ENCOUNTER — MYC MEDICAL ADVICE (OUTPATIENT)
Dept: DERMATOLOGY | Facility: CLINIC | Age: 54
End: 2024-12-15
Payer: COMMERCIAL

## 2024-12-15 NOTE — PROGRESS NOTES
[facility] Dermatology Note  Encounter Date: Dec 16, 2024  [employer] and Telephone ([phone]). Location of teledermatologist: [facility].  Start time: 9:34am. End time: 10:01am.    Dermatology Problem List:  Last skin check: 6/30/23  1. Plaque psoriasis: Skyrizi  triamcinolone 0.1% ointment, desonide 0.05% ointment, calcipotriene 0.005% ointment, Clobetasol  - Past tx: Soriatane 25 mg daily stopped due to pyogenic granulomas 6/2018, Humira, Stelara(had elevated WBC and saw heme), otezla didn't work   2. Onycholysis of distal right toenails, May be in association with psoriasis.   3. Seborrheic Dermatitis  - Current Tx: clobetasol, ketoconazole 2% shampoo    ____________________________________________    Assessment & Plan:     # Plaque psoriasis - Flaring today. Pt reports Stelara worked minimally after starting, Taltz is doing well but insurance is requiring we switch to Bimselx. Avoid humira (see heme onc record) is possible.   - Continue clobetaso for the body, westcort for the face  - Desonide as above for the groin  - recommend covid and flu vaccination  -negative quant gold   -for bimzelexSide effects of treatment with biologic therapy were reviewed including but not limited to immune suppression, increased susceptibility to infection, injection site and systemic reactions, and possible increased risk of lymphoma and other malignancy. The patient denies history of hepatitis, personal or first degree relative with demyelinating disease, history of heart failure,  history of malignancy, hematologic disorders, other immune compromising medications/disorders, tuberculosis or BCG vaccination. Discussed need to contact clinic if patient is ill or not feeling well as we may hold medication. Patient is up to date on vaccinations. Discussed that live vaccinations should not be given while on this medication and clinic should be contact prior to vaccinations.    Patient should also get yearly flu vaccination and covid.   -phototherapy 3 times weekly until see Dr. Ahuja  -reviewed GI, suidicde warning, LFT warning, hold drug for illness and reviewed this common list:   URI   Oral Candidiasis   Headache    Injection Site Reactions  Tinea Infections   Gastroenteritise    Herpes Simplex Infections   Acne   Folliculitis    Other Candida Infectionsg    Fatigue    -Patient weight more than 120KG.        #Patient has history of elevated WBC, has seen heme onc, we are told to check yearly    Procedures Performed:    None    Follow-up: 3 months in person    Staff and Scribe:   Scribe Disclosure:   I, Jessica Menendez, am serving as a scribe to document services personally performed by Jacey Cervantes MD based on data collection and the provider's statements to me.     Provider Disclosure:   The documentation recorded by the scribe accurately reflects the services I personally performed and the decisions made by me.    Jacey Cervantes MD    Department of Dermatology  [facility]: Phone: [phone], Fax:[phone]  [facility]: Phone: [phone], Fax: [phone]   ____________________________________________    CC: Psoriasis (Things have been going fine. Looking to change medication due to insurance coverage.)    HPI:  Mr. To White is a(n) 54 year old male who presents today as a return patient for derm problem.    Today, patient reports Taltz no longer being covered by insurance     Is doing phototherapy    Will get his caovid vaccine    Patient is otherwise feeling well, without additional skin concerns.    Labs Reviewed:  Last B reviewed    Physical Exam:  Vitals: There were no vitals taken for this visit.  SKIN: Teledermatology photos were reviewed; image quality and interpretability:  acceptable. Image date: see upload date.  - scaly red papules and  macules  -pigmented lesions not assessable  - No other lesions of concern on areas examined.     Medications:  Current Outpatient Medications   Medication Sig Dispense Refill    amLODIPine (NORVASC) 10 MG tablet Take 1 tablet (10 mg) by mouth daily. 90 tablet 4    benazepril (LOTENSIN) 40 MG tablet Take 1 tablet (40 mg) by mouth daily. 90 tablet 4    calcipotriene (DOVONOX) 0.005 % external ointment Apply to the affected area twice a day Mon-Fri when rash is relatively calm. 240 g 3    clobetasol (TEMOVATE) 0.05 % external ointment Apply twice daily for 2 weeks to back. 60 g 4    clobetasol (TEMOVATE) 0.05 % external solution APPLY ON SCALP FOR UP TO 2 WEEKS IN A ROW FOR FLARES 150 mL 3    desonide (DESOWEN) 0.05 % external ointment Apply to the affected area of the skin of groin or face daily for 2-3 week bursts as needed. 60 g 6    hydrocortisone (WESTCORT) 0.2 % external cream Apply twice daily for 2 weeks to the face, then weekends only for 2 weeks, repeat if psoriasis present 60 g 2    hydrOXYzine HCl (ATARAX) 25 MG tablet TAKE 1-2 TABLETS BY MOUTH  EVERY 6 HOURS AS NEEDED FOR ITCHING 360 tablet 1    ixekizumab (TALTZ) 80 MG/ML SOAJ auto-injector Inject 160 mg (2 mL) once, then start 80 mg (1 mL) at weeks 2, 4, 6, 8, 10, and 12 2 mL 3    ixekizumab (TALTZ) 80 MG/ML SOAJ auto-injector Inject 1 mL (80 mg) subcutaneously every 28 days 2 mL 3    sildenafil (VIAGRA) 100 MG tablet Take 0.5-1 tablets ( mg) by mouth daily as needed (30 minutes prior to sexual acitivity). 30 tablet 11    triamcinolone (KENALOG) 0.1 % external ointment Apply to affected areas of the trunk twice a day when flaring, M-F when improved, weekend only when relatively calm. 454 g 6    triamcinolone (KENALOG) 0.1 % external ointment Apply topically 2 times daily       No current facility-administered medications for this visit.      Past Medical/Surgical History:   Patient Active Problem List   Diagnosis    Other psoriasis    Obesity     Essential hypertension with goal blood pressure less than 140/90    Obesity, Class I, BMI 30-34.9    Benign cyst of both testicles    Class 2 severe obesity due to excess calories with serious comorbidity in adult (H)     Past Medical History:   Diagnosis Date    Cholecystitis     Essential hypertension, benign 01/01/2000    Hypertension     Obesity     Other psoriasis     since childhood    Tubular adenoma        CC Jacey Cervantes MD  [address]  [place] [address] on close of this encounter.

## 2024-12-16 ENCOUNTER — VIRTUAL VISIT (OUTPATIENT)
Dept: DERMATOLOGY | Facility: CLINIC | Age: 54
End: 2024-12-16
Payer: COMMERCIAL

## 2024-12-16 DIAGNOSIS — L40.9 PSORIASIS: Primary | ICD-10-CM

## 2024-12-16 RX ORDER — BIMEKIZUMAB 160 MG/ML
2 INJECTION, SOLUTION SUBCUTANEOUS
Qty: 2 ML | Refills: 11 | Status: SHIPPED | OUTPATIENT
Start: 2024-12-16

## 2024-12-16 NOTE — LETTER
12/16/2024      To White  [id number] [address] [place] [id number]      Dear Colleague,    Thank you for referring your patient, To White, to the [facility]. Please see a copy of my visit note below.       [facility] Dermatology Note  Encounter Date: Dec 16, 2024  Store-and-Forward and Telephone ([phone]). Location of teledermatologist: [facility].  Start time: 9:34am. End time: 10:01am.    Dermatology Problem List:  Last skin check: 6/30/23  1. Plaque psoriasis: Skyrizi  triamcinolone 0.1% ointment, desonide 0.05% ointment, calcipotriene 0.005% ointment, Clobetasol  - Past tx: Soriatane 25 mg daily stopped due to pyogenic granulomas 6/2018, Elvia, Marci(had elevated WBC and saw heme), otezla didn't work   2. Onycholysis of distal right toenails, May be in association with psoriasis.   3. Seborrheic Dermatitis  - Current Tx: clobetasol, ketoconazole 2% shampoo    ____________________________________________    Assessment & Plan:     # Plaque psoriasis - Flaring today. Pt reports Steagustina worked minimally after starting, Enid is doing well but insurance is requiring we switch to Bimselx. Avoid humira (see heme onc record) is possible.   - Continue clobetaso for the body, westcort for the face  - Desonide as above for the groin  - recommend covid and flu vaccination  -negative quant gold   -for bimzelexSide effects of treatment with biologic therapy were reviewed including but not limited to immune suppression, increased susceptibility to infection, injection site and systemic reactions, and possible increased risk of lymphoma and other malignancy. The patient denies history of hepatitis, personal or first degree relative with demyelinating disease, history of heart failure,  history of malignancy, hematologic disorders, other immune compromising medications/disorders, tuberculosis or BCG vaccination. Discussed need to contact  clinic if patient is ill or not feeling well as we may hold medication. Patient is up to date on vaccinations. Discussed that live vaccinations should not be given while on this medication and clinic should be contact prior to vaccinations.   Patient should also get yearly flu vaccination and covid.   -phototherapy 3 times weekly until see Dr. Ahuja  -reviewed GI, suidicde warning, LFT warning, hold drug for illness and reviewed this common list:   URI   Oral Candidiasis   Headache    Injection Site Reactions  Tinea Infections   Gastroenteritise    Herpes Simplex Infections   Acne   Folliculitis    Other Candida Infectionsg    Fatigue    -Patient weight more than 120KG.        #Patient has history of elevated WBC, has seen heme onc, we are told to check yearly    Procedures Performed:    None    Follow-up: 3 months in person    Staff and Scribe:   Scribe Disclosure:   I, Jessica Menendez, am serving as a scribe to document services personally performed by Jacey Cervantes MD based on data collection and the provider's statements to me.     Provider Disclosure:   The documentation recorded by the scribe accurately reflects the services I personally performed and the decisions made by me.    Jacey Cervantes MD    Department of Dermatology  [facility]: Phone: [phone], Fax:[phone]  [facility]: Phone: [phone], Fax: [phone]   ____________________________________________    CC: Psoriasis (Things have been going fine. Looking to change medication due to insurance coverage.)    HPI:  Mr. To White is a(n) 54 year old male who presents today as a return patient for derm problem.    Today, patient reports Taltz no longer being covered by insurance     Is doing phototherapy    Will get his caovid vaccine    Patient is otherwise feeling well, without additional skin concerns.    Labs  Reviewed:  Last B reviewed    Physical Exam:  Vitals: There were no vitals taken for this visit.  SKIN: Teledermatology photos were reviewed; image quality and interpretability:  acceptable. Image date: see upload date.  - scaly red papules and macules  -pigmented lesions not assessable  - No other lesions of concern on areas examined.     Medications:  Current Outpatient Medications   Medication Sig Dispense Refill     amLODIPine (NORVASC) 10 MG tablet Take 1 tablet (10 mg) by mouth daily. 90 tablet 4     benazepril (LOTENSIN) 40 MG tablet Take 1 tablet (40 mg) by mouth daily. 90 tablet 4     calcipotriene (DOVONOX) 0.005 % external ointment Apply to the affected area twice a day Mon-Fri when rash is relatively calm. 240 g 3     clobetasol (TEMOVATE) 0.05 % external ointment Apply twice daily for 2 weeks to back. 60 g 4     clobetasol (TEMOVATE) 0.05 % external solution APPLY ON SCALP FOR UP TO 2 WEEKS IN A ROW FOR FLARES 150 mL 3     desonide (DESOWEN) 0.05 % external ointment Apply to the affected area of the skin of groin or face daily for 2-3 week bursts as needed. 60 g 6     hydrocortisone (WESTCORT) 0.2 % external cream Apply twice daily for 2 weeks to the face, then weekends only for 2 weeks, repeat if psoriasis present 60 g 2     hydrOXYzine HCl (ATARAX) 25 MG tablet TAKE 1-2 TABLETS BY MOUTH  EVERY 6 HOURS AS NEEDED FOR ITCHING 360 tablet 1     ixekizumab (TALTZ) 80 MG/ML SOAJ auto-injector Inject 160 mg (2 mL) once, then start 80 mg (1 mL) at weeks 2, 4, 6, 8, 10, and 12 2 mL 3     ixekizumab (TALTZ) 80 MG/ML SOAJ auto-injector Inject 1 mL (80 mg) subcutaneously every 28 days 2 mL 3     sildenafil (VIAGRA) 100 MG tablet Take 0.5-1 tablets ( mg) by mouth daily as needed (30 minutes prior to sexual acitivity). 30 tablet 11     triamcinolone (KENALOG) 0.1 % external ointment Apply to affected areas of the trunk twice a day when flaring, M-F when improved, weekend only when relatively calm. 454 g 6      triamcinolone (KENALOG) 0.1 % external ointment Apply topically 2 times daily       No current facility-administered medications for this visit.      Past Medical/Surgical History:   Patient Active Problem List   Diagnosis     Other psoriasis     Obesity     Essential hypertension with goal blood pressure less than 140/90     Obesity, Class I, BMI 30-34.9     Benign cyst of both testicles     Class 2 severe obesity due to excess calories with serious comorbidity in adult (H)     Past Medical History:   Diagnosis Date     Cholecystitis      Essential hypertension, benign 01/01/2000     Hypertension      Obesity      Other psoriasis     since childhood     Tubular adenoma        CC Jacey Cervantes MD  [address] [place] [phone] on close of this encounter.      Again, thank you for allowing me to participate in the care of your patient.        Sincerely,      Jacey Cervantes MD

## 2024-12-16 NOTE — PATIENT INSTRUCTIONS
IMPORTANT SAFETY INFORMATION    What is the most important information I should know about BIMZELX  (bimekizumab-bkzx)?    BIMZELX is a medicine that affects your immune system. BIMZELX may increase your risk of having serious side effects, including:    Suicidal thoughts and behavior have happened in some people treated with BIMZELX. Get medical help right away or call the National Suicide and Crisis Lifeline at 988 if you, your caregiver or your family member notice in you any of the following symptoms:  new or worsening depression or anxiety  thoughts of suicide, dying, or hurting yourself  changes in behavior or mood  acting on dangerous impulses  attempt to commit suicide  Infections. BIMZELX is a medicine that may lower the ability of your immune system to fight infections and may increase your risk of infections, including serious infections.  Your healthcare provider should check you for infections and tuberculosis (TB) before starting treatment with BIMZELX.  If your healthcare provider feels you are at risk for TB, you may be treated with medicine for TB before you begin treatment with BIMZELX and during your treatment.  Your doctor should watch you closely for signs and symptoms of TB during and after treatment with BIMZELX. Do not take BIMZELX if you have an active TB infection.  Before starting BIMZELX, tell your healthcare provider if you:    are being treated for an infection  have an infection that does not go away or that keeps coming back  have TB or have been in close contact with someone with TB  think you have an infection or have symptoms of an infection such as:  fever, sweats, or chills  muscle aches  cough  shortness of breath  blood in your phlegm  weight loss  warm, red, or painful skin or sores on your body different from your psoriasis  diarrhea or stomach pain  burning when you urinate or urinating more often than normal  After starting BIMZELX, call your healthcare provider right away  if you have any of the signs of infection listed above. Do not use BIMZELX if you have any signs of infection unless you are instructed to by your healthcare provider. See  What are the possible side effects of BIMZELX?  for more information about side effects.    What is BIMZELX?    BIMZELX is a prescription medicine used to treat:    adults with moderate-to-severe plaque psoriasis who may benefit from taking injections or pills (systemic therapy) or treatment using ultraviolet light alone or with pills (phototherapy)  adults with active psoriatic arthritis  adults with active non-radiographic axial spondyloarthritis with objective signs of inflammation  adults with active ankylosing spondylitis  adults with moderate-to-severe hidradenitis suppurativa.  It is not known if BIMZELX is safe and effective in children.    Before using BIMZELX, tell your healthcare provider about all of your medical conditions, including if you:    have any of the conditions or symptoms listed in the section  What is the most important information I should know about BIMZELX?   have a history of depression, or suicidal thoughts or behavior  have liver problems  have inflammatory bowel disease (Crohn s disease or ulcerative colitis)  have recently received or are scheduled to receive an immunization (vaccine). You should avoid receiving live vaccines during treatment with BIMZELX.  are pregnant or plan to become pregnant. It is not known if BIMZELX can harm your unborn baby.  If you become pregnant while taking BIMZELX, you are encouraged to enroll in the Pregnancy Registry, which is used to collect information about the health of you and your baby. Talk to your healthcare provider or call 1-631.604.9282 to enroll in this registry or visit http://mothertobaby.org/pregnancy-studies/.  are breastfeeding or plan to breastfeed. It is not known if BIMZELX passes into your breast milk. Talk to your healthcare provider about the best way to feed  your baby during treatment with BIMZELX.  Tell your healthcare provider about all the medicines you take, including prescription and over-the-counter medicines, vitamins, and herbal supplements.    What are the possible side effects of BIMZELX?    BIMZELX may cause serious side effects. See  What is the most important information I should know about BIMZELX?     Elevated liver enzyme levels. Your healthcare provider will do blood tests to check your liver enzyme levels before starting treatment and during treatment with BIMZELX. Your healthcare provider may temporarily stop or permanently stop your treatment with BIMZELX if you develop liver problems. Call your healthcare provider right away if you develop any signs or symptoms of liver problems, including:  pain on the right side of your stomach-area  feeling very tired  loss of appetite  nausea and vomiting  itching  dark urine  light-colored stool  yellowing of your skin or the whites of your eyes  Inflammatory bowel disease. New cases of inflammatory bowel disease or  flare-ups  have happened with BIMZELX. If you have inflammatory bowel disease (Crohn s disease or ulcerative colitis), tell your healthcare provider if you have worsening disease symptoms during treatment with BIMZELX or develop new or worsening signs of Crohn s disease or ulcerative colitis.  The most common side effects of BIMZELX in people treated for psoriasis and moderate-to-severe hidradenitis suppurativa include: upper respiratory tract infections, headache, herpes simplex infections (cold sores in or around the mouth), small red bumps on your skin, feeling tired, fungal infections (oral thrush or infection in the mouth, throat, skin, nails, feet, or genitals), pain, redness or swelling at injection site, stomach flu (gastroenteritis), and acne.    The most common side effects of BIMZELX in people treated for psoriatic arthritis include: upper respiratory tract infections, headache, urinary  tract infections, oral thrush or infections in the mouth, and diarrhea.    The most common side effects of BIMZELX in people treated for non-radiographic axial spondyloarthritis include: upper respiratory tract infections, headache, cough, joint pain, tonsillitis, urinary tract infections, oral thrush or infections in the mouth, diarrhea, feeling tired, muscle aches, and an increase in liver enzyme levels.    The most common side effects of BIMZELX in people treated for ankylosing spondylitis include: upper respiratory tract infections, headache, pain at injection site, vaginal yeast infections, oral thrush or infections in the mouth, diarrhea, and rash.    These are not all of the possible side effects of BIMZELX. Call your doctor for medical advice about side effects.    Use BIMZELX exactly as your doctor tells you to use it.    You are encouraged to report negative side effects of prescription drugs to the FDA. Visit www.fda.gov/medwatch, or call 1-343-TVW-4034.

## 2024-12-16 NOTE — NURSING NOTE
Teledermatology Nurse Call Patients:     Are you in the [facility] at the time of the encounter? yes    Today's visit will be billed to you and your insurance.    A teledermatology visit is not as thorough as an in-person visit and the quality of the photograph sent may not be of the same quality as that taken by the dermatology clinic.     To White's goals for this visit include:   Chief Complaint   Patient presents with    Psoriasis     Things have been going fine. Looking to change medication due to insurance coverage.       He requests these members of his care team be copied on today's visit information:     PCP: Christo Richard    Referring Provider:  Jacey Cervantes MD  [address] [place] [id number]    There were no vitals taken for this visit.    Do you need any medication refills at today's visit?     Kate Tolbert on 12/16/2024 at 8:29 AM

## 2024-12-17 ENCOUNTER — TELEPHONE (OUTPATIENT)
Dept: DERMATOLOGY | Facility: CLINIC | Age: 54
End: 2024-12-17
Payer: COMMERCIAL

## 2024-12-17 NOTE — TELEPHONE ENCOUNTER
MTM referral from: [facility] visit (referral by provider)    MTM referral outreach attempt #1 on December 17, 2024 at 1:37 PM      Outcome: Spoke with patient He declined to schedule at this time. He has not started the medication yet     [facility]

## 2024-12-17 NOTE — TELEPHONE ENCOUNTER
Prior Authorization Approval    Medication: BIMZELX 160 MG/ML SC SOAJ  Authorization Effective Date:    Authorization Expiration Date: 7/26/2025  Approved Dose/Quantity: 2ml for 28 days  Reference #:     Insurance Company: [employer] - Phone [phone] Fax [phone]  Expected CoPay: $    CoPay Card Available: No    Financial Assistance Needed: unable to see copay  Which Pharm[facility] is filling the prescription: [facility]  Pharmacy Notified: yes  Patient Notified: yes

## 2024-12-18 ENCOUNTER — TELEPHONE (OUTPATIENT)
Dept: DERMATOLOGY | Facility: CLINIC | Age: 54
End: 2024-12-18
Payer: COMMERCIAL

## 2024-12-18 NOTE — TELEPHONE ENCOUNTER
Pt no longer taking this, not on active med list, see Dr Cervantes's last note:      Taltz is doing well but insurance is requiring we switch to Bimselx       Pricilla Yung RN on 12/18/2024 at 10:36 AM

## 2024-12-18 NOTE — TELEPHONE ENCOUNTER
PT is waiting for their medication Taltz 80mg/ml auto-injectors from [facility] [place] follow up/ [facility] [employer].  Key: [id number]  Tel: NA  Fax: MILAGRO Song on 12/18/2024 at 9:46 AM

## 2025-06-23 ENCOUNTER — OFFICE VISIT (OUTPATIENT)
Dept: DERMATOLOGY | Facility: CLINIC | Age: 55
End: 2025-06-23
Payer: COMMERCIAL

## 2025-06-23 DIAGNOSIS — L40.0 PLAQUE PSORIASIS: Primary | ICD-10-CM

## 2025-06-23 DIAGNOSIS — D84.9 IMMUNOSUPPRESSION: ICD-10-CM

## 2025-06-23 NOTE — NURSING NOTE
To White's goals for this visit include:   Chief Complaint   Patient presents with    Psoriasis     Pt is here for 6 month follow up. Pso is doing well. There are little areas that are still scaly.       He requests these members of his care team be copied on today's visit information:     PCP: Christo Richard    Referring Provider:  Jacey Cervantes MD  [address] [place] [id number]    There were no vitals taken for this visit.    Do you need any medication refills at today's visit?     Gisela Ying LPN on 6/23/2025 at 7:09 AM

## 2025-06-23 NOTE — PROGRESS NOTES
[facility] Dermatology Note  Encounter Date: Jun 23, 2025  Office Visit    Dermatology Problem List:  Last skin check: 6/30/23  1. Plaque psoriasis  - current tx: triamcinolone 0.1% ointment, desonide 0.05% ointment, calcipotriene 0.005% ointment, Clobetasol 0.05% ointment, Bimekizumab 160 mg/mL, Hydroxyzine 25 mg  - Past tx: Soriatane 25 mg daily stopped due to pyogenic granulomas 6/2018, Marci Gan(had elevated WBC and saw heme), otezla didn't work   2. Onycholysis of distal right toenails, May be in association with psoriasis.  3. Seborrheic Dermatitis  - Current Tx: clobetasol, ketoconazole 2% shampoo    ____________________________________________    Assessment & Plan:     1. Plaque psoriasis - stable and improving with Bimekizumab today, still not at goal.   - Continue clobetasol 0.05% ointment for the body, hydrocortisone 0.2% cream  for the face  - continue Desonide 0.05% ointment as above for the groin  - continue Bimekizumab 160 mg/mL subcutaneous injection  - continue Hydroxyzine 25 mg PRN for itching  - Quantiferon TB labs ordered today for future (expected 09/2025)        Procedures Performed:    None    Follow-up: 12 months in person, or sooner for new or changing lesions    Staff and Scribe:   Krish Disclosure:   By signing my name below, I, Jackelyn Yasmin, attest that this documentation has been prepared under the direction and in the presence of Jeremias Parr MD.  - Electronically Signed: Jackelyn Hoffman 06/23/25       Provider Disclosure:   The documentation recorded by the scribe accurately reflects the services I personally performed and the decisions made by me.    Jeremias Parr MD   of Dermatology  Department of Dermatology  [facility]      ____________________________________________    CC: Psoriasis (Pt is here for 6 month follow up. Pso is doing well. There are little areas that are still scaly.)    HPI:    To White is a(n) 55 year old male who presents today for follow-up  for psoriasis.    Patient reports his psoriasis is basically stable with minor skin changes or flaring while on bimzelx. He notes some slight discoloration and scaling but nothing that is of high intensity. There has been some minor itching for which he uses hydroxyzine and topicals PRN.    Patient is otherwise feeling well, without additional skin concerns.    Labs Reviewed:  Quantiferon TB 09/27/2024    Physical exam:  Vitals: There were no vitals taken for this visit.  GEN: This is a well developed, well-nourished male in no acute distress, in a pleasant mood.    SKIN: Franks phototype II  - Focused examination of the face, arms, legs was performed.  - faint erythema on forearms and lower legs with no scaling  - No other lesions of concern on areas examined.       Medications:  Current Outpatient Medications   Medication Sig Dispense Refill    amLODIPine (NORVASC) 10 MG tablet Take 1 tablet (10 mg) by mouth daily. 90 tablet 4    benazepril (LOTENSIN) 40 MG tablet Take 1 tablet (40 mg) by mouth daily. 90 tablet 4    Bimekizumab-bkzx (BIMZELX) 160 MG/ML SOAJ Inject 2 mLs subcutaneously every 30 days. 2 mL 11    calcipotriene (DOVONOX) 0.005 % external ointment Apply to the affected area twice a day Mon-Fri when rash is relatively calm. 240 g 3    clobetasol (TEMOVATE) 0.05 % external ointment Apply twice daily for 2 weeks to back. 60 g 4    clobetasol (TEMOVATE) 0.05 % external solution APPLY ON SCALP FOR UP TO 2 WEEKS IN A ROW FOR FLARES 150 mL 3    desonide (DESOWEN) 0.05 % external ointment Apply to the affected area of the skin of groin or face daily for 2-3 week bursts as needed. 60 g 6    hydrocortisone (WESTCORT) 0.2 % external cream Apply twice daily for 2 weeks to the face, then weekends only for 2 weeks, repeat if psoriasis present 60 g 2    hydrOXYzine HCl (ATARAX) 25 MG tablet TAKE 1-2 TABLETS BY MOUTH  EVERY 6 HOURS AS NEEDED  FOR ITCHING 360 tablet 1    sildenafil (VIAGRA) 100 MG tablet Take 0.5-1 tablets ( mg) by mouth daily as needed (30 minutes prior to sexual acitivity). 30 tablet 11    triamcinolone (KENALOG) 0.1 % external ointment Apply to affected areas of the trunk twice a day when flaring, M-F when improved, weekend only when relatively calm. 454 g 6    triamcinolone (KENALOG) 0.1 % external ointment Apply topically 2 times daily       No current facility-administered medications for this visit.      Past Medical/Surgical History:   Patient Active Problem List   Diagnosis    Other psoriasis    Obesity    Essential hypertension with goal blood pressure less than 140/90    Obesity, Class I, BMI 30-34.9    Benign cyst of both testicles    Class 2 severe obesity due to excess calories with serious comorbidity in adult (H)     Past Medical History:   Diagnosis Date    Cholecystitis     Essential hypertension, benign 01/01/2000    Hypertension     Obesity     Other psoriasis     since childhood    Tubular adenoma        CC Jacey Cervantes MD  [address] [facility] [address]  [place] [address] on close of this encounter.

## 2025-06-23 NOTE — LETTER
6/23/2025      To White  [id number] [address] [place] [id number]      Dear Colleague,    Thank you for referring your patient, To White, to the [facility]. Please see a copy of my visit note below.     [facility] Dermatology Note  Encounter Date: Jun 23, 2025  Office Visit    Dermatology Problem List:  Last skin check: 6/30/23  1. Plaque psoriasis  - current tx: triamcinolone 0.1% ointment, desonide 0.05% ointment, calcipotriene 0.005% ointment, Clobetasol 0.05% ointment, Bimekizumab 160 mg/mL, Hydroxyzine 25 mg  - Past tx: Soriatane 25 mg daily stopped due to pyogenic granulomas 6/2018, Marci Gan(had elevated WBC and saw heme), otezla didn't work   2. Onycholysis of distal right toenails, May be in association with psoriasis.  3. Seborrheic Dermatitis  - Current Tx: clobetasol, ketoconazole 2% shampoo    ____________________________________________    Assessment & Plan:     1. Plaque psoriasis - stable and improving with Bimekizumab today, still not at goal.   - Continue clobetasol 0.05% ointment for the body, hydrocortisone 0.2% cream  for the face  - continue Desonide 0.05% ointment as above for the groin  - continue Bimekizumab 160 mg/mL subcutaneous injection  - continue Hydroxyzine 25 mg PRN for itching  - Quantiferon TB labs ordered today for future (expected 09/2025)        Procedures Performed:    None    Follow-up: 12 months in person, or sooner for new or changing lesions    Staff and Scribe:   Scribe Disclosure:   By signing my name below, I, Jackelyn Hoffman, attest that this documentation has been prepared under the direction and in the presence of Jeremias Parr MD.  - Electronically Signed: Jackelyn Hoffman 06/23/25       Provider Disclosure:   The documentation recorded by the scribe accurately reflects the services I personally performed and the decisions made by me.    Jeremias Parr MD   of Dermatology  Department of  Dermatology  [facility]      ____________________________________________    CC: Psoriasis (Pt is here for 6 month follow up. Pso is doing well. There are little areas that are still scaly.)    HPI:  Mr. To White is a(n) 55 year old male who presents today for follow-up  for psoriasis.    Patient reports his psoriasis is basically stable with minor skin changes or flaring while on bimzelx. He notes some slight discoloration and scaling but nothing that is of high intensity. There has been some minor itching for which he uses hydroxyzine and topicals PRN.    Patient is otherwise feeling well, without additional skin concerns.    Labs Reviewed:  Quantiferon TB 09/27/2024    Physical exam:  Vitals: There were no vitals taken for this visit.  GEN: This is a well developed, well-nourished male in no acute distress, in a pleasant mood.    SKIN: Franks phototype II  - Focused examination of the face, arms, legs was performed.  - faint erythema on forearms and lower legs with no scaling  - No other lesions of concern on areas examined.       Medications:  Current Outpatient Medications   Medication Sig Dispense Refill     amLODIPine (NORVASC) 10 MG tablet Take 1 tablet (10 mg) by mouth daily. 90 tablet 4     benazepril (LOTENSIN) 40 MG tablet Take 1 tablet (40 mg) by mouth daily. 90 tablet 4     Bimekizumab-bkzx (BIMZELX) 160 MG/ML SOAJ Inject 2 mLs subcutaneously every 30 days. 2 mL 11     calcipotriene (DOVONOX) 0.005 % external ointment Apply to the affected area twice a day Mon-Fri when rash is relatively calm. 240 g 3     clobetasol (TEMOVATE) 0.05 % external ointment Apply twice daily for 2 weeks to back. 60 g 4     clobetasol (TEMOVATE) 0.05 % external solution APPLY ON SCALP FOR UP TO 2 WEEKS IN A ROW FOR FLARES 150 mL 3     desonide (DESOWEN) 0.05 % external ointment Apply to the affected area of the skin of groin or face daily for 2-3 week bursts as needed. 60 g 6      hydrocortisone (WESTCORT) 0.2 % external cream Apply twice daily for 2 weeks to the face, then weekends only for 2 weeks, repeat if psoriasis present 60 g 2     hydrOXYzine HCl (ATARAX) 25 MG tablet TAKE 1-2 TABLETS BY MOUTH  EVERY 6 HOURS AS NEEDED FOR ITCHING 360 tablet 1     sildenafil (VIAGRA) 100 MG tablet Take 0.5-1 tablets ( mg) by mouth daily as needed (30 minutes prior to sexual acitivity). 30 tablet 11     triamcinolone (KENALOG) 0.1 % external ointment Apply to affected areas of the trunk twice a day when flaring, M-F when improved, weekend only when relatively calm. 454 g 6     triamcinolone (KENALOG) 0.1 % external ointment Apply topically 2 times daily       No current facility-administered medications for this visit.      Past Medical/Surgical History:   Patient Active Problem List   Diagnosis     Other psoriasis     Obesity     Essential hypertension with goal blood pressure less than 140/90     Obesity, Class I, BMI 30-34.9     Benign cyst of both testicles     Class 2 severe obesity due to excess calories with serious comorbidity in adult (H)     Past Medical History:   Diagnosis Date     Cholecystitis      Essential hypertension, benign 01/01/2000     Hypertension      Obesity      Other psoriasis     since childhood     Tubular adenoma        CC Jacey Cervantes MD  [address] [facility]  [place] [address] on close of this encounter.        Again, thank you for allowing me to participate in the care of your patient.        Sincerely,        Jeremias Parr MD    Electronically signed

## 2025-06-30 ENCOUNTER — TELEPHONE (OUTPATIENT)
Dept: DERMATOLOGY | Facility: CLINIC | Age: 55
End: 2025-06-30
Payer: COMMERCIAL

## 2025-06-30 NOTE — TELEPHONE ENCOUNTER
"Clinical program request for Triamcinolon Oin 0.1% 454 from [facility] states \" medium potency topicals are generally well tolerated when used for no more than 3 months continuous, less frequent dosing may be considered once symptoms are controlled, is it appropriate to discontinue? \"  Ref #: [id number]  Fax ID: [id number]  Tel: [phone]  Fax: [phone]    Rudi Song MA on 6/30/2025 at 10:02 AM    "

## 2025-06-30 NOTE — TELEPHONE ENCOUNTER
Called pharmacy per Keshav's last  note this medication is not mentioned he is not continuing to order this, ok to discontinue as this was previously ordered by Dr Cervantes.    Pricilla Yung RN on 6/30/2025 at 11:53 AM

## 2025-06-30 NOTE — TELEPHONE ENCOUNTER
[facility] Call Center    Phone Message    May a detailed message be left on voicemail: yes     Reason for Call: Medication Question or concern regarding medication   Prescription Clarification  Name of Medication: triamcinolone (KENALOG) 0.1 % external ointment   Prescribing Provider: Dr. Cervantes   Pharmacy:   [facility] - [facility] AT PORTAL TO REGISTERED [facility] SITES      What on the order needs clarification? Pharmacy states they haven't had a response to their fax requesting clarification of dose and quantity     Please call them at [phone], ref# [id number]      Action Taken: Message routed to:  Adult Clinics: Dermatology p [id number]    Travel Screening: Not Applicable     Date of Service:

## 2025-07-09 ENCOUNTER — TELEPHONE (OUTPATIENT)
Dept: DERMATOLOGY | Facility: CLINIC | Age: 55
End: 2025-07-09
Payer: COMMERCIAL

## 2025-07-09 NOTE — TELEPHONE ENCOUNTER
PA needed for Bimzelx 160mg/ml Auto-injectors from [facility] on [facility] in [place], [place].  Key: [id number]  Tel: [phone]  Fax: [phone]    Rudi Song MA on 7/9/2025 at 10:36 AM

## 2025-07-11 NOTE — TELEPHONE ENCOUNTER
PA Initiation    Medication: BIMZELX 160 MG/ML SC SOAJ  Insurance Company: [employer] - Phone [phone] Fax [phone]  Pharmacy Filling the Rx:    Filling Pharmacy Phone:    Filling Pharmacy Fax:    Start Date: 7/11/2025      Key: [id number]

## 2025-07-21 NOTE — TELEPHONE ENCOUNTER
Completed clinical questions on CMM.    Thank you,     Magdaleno Hernández [facility]  Pharmacy Clinic [facility]  [email or web address]   Phone: [phone]  Fax: [phone]

## 2025-07-21 NOTE — TELEPHONE ENCOUNTER
Re-submitted PA. Clinical questions did not populate.          Thank you,     Magdaleno Hernández [facility]  Pharmacy Clinic [facility]  [email or web address]   Phone: [phone]  Fax: [phone]

## 2025-07-24 NOTE — TELEPHONE ENCOUNTER
Prior Authorization Approval    Medication: BIMZELX 160 MG/ML SC SOAJ  Authorization Effective Date: 7/24/2025  Authorization Expiration Date: 7/24/2026  Approved Dose/Quantity: 2ml for 28 days  Reference #: Key: [id number]   Insurance Company: [employer] - Phone [phone] Fax [phone]  Expected CoPay: $    CoPay Card Available: No    Financial Assistance Needed: no  Which Pharm[facility] is filling the prescription[facility]  Pharmacy Notified: yes   Patient Notified: not needed renewal

## (undated) DEVICE — ENDO SNARE EXACTO COLD 9MM LOOP 2.4MMX230CM 00711115

## (undated) DEVICE — ENDO FORCEP ENDOJAW BIOPSY 2.8MMX230CM FB-220U

## (undated) DEVICE — KIT ENDO TURNOVER/PROCEDURE CARRY-ON 101822

## (undated) DEVICE — SOL WATER IRRIG 1000ML BOTTLE 2F7114

## (undated) DEVICE — TUBING SUCTION 6"X3/16" N56A

## (undated) RX ORDER — FENTANYL CITRATE 50 UG/ML
INJECTION, SOLUTION INTRAMUSCULAR; INTRAVENOUS
Status: DISPENSED
Start: 2017-05-10

## (undated) RX ORDER — LIDOCAINE HYDROCHLORIDE 10 MG/ML
INJECTION, SOLUTION EPIDURAL; INFILTRATION; INTRACAUDAL; PERINEURAL
Status: DISPENSED
Start: 2017-05-10